# Patient Record
Sex: MALE | Race: BLACK OR AFRICAN AMERICAN | NOT HISPANIC OR LATINO | Employment: OTHER | URBAN - NONMETROPOLITAN AREA
[De-identification: names, ages, dates, MRNs, and addresses within clinical notes are randomized per-mention and may not be internally consistent; named-entity substitution may affect disease eponyms.]

---

## 2020-05-19 ENCOUNTER — HISTORICAL (OUTPATIENT)
Dept: ADMINISTRATIVE | Facility: HOSPITAL | Age: 60
End: 2020-05-19

## 2020-06-21 ENCOUNTER — HISTORICAL (OUTPATIENT)
Dept: ADMINISTRATIVE | Facility: HOSPITAL | Age: 60
End: 2020-06-21

## 2020-12-29 ENCOUNTER — HISTORICAL (OUTPATIENT)
Dept: ADMINISTRATIVE | Facility: HOSPITAL | Age: 60
End: 2020-12-29

## 2021-08-26 ENCOUNTER — HISTORICAL (OUTPATIENT)
Dept: ADMINISTRATIVE | Facility: HOSPITAL | Age: 61
End: 2021-08-26

## 2022-11-13 ENCOUNTER — HISTORICAL (OUTPATIENT)
Dept: ADMINISTRATIVE | Facility: HOSPITAL | Age: 62
End: 2022-11-13

## 2022-11-27 ENCOUNTER — HISTORICAL (OUTPATIENT)
Dept: ADMINISTRATIVE | Facility: HOSPITAL | Age: 62
End: 2022-11-27

## 2022-12-11 ENCOUNTER — HISTORICAL (OUTPATIENT)
Dept: ADMINISTRATIVE | Facility: HOSPITAL | Age: 62
End: 2022-12-11

## 2022-12-12 ENCOUNTER — HISTORICAL (OUTPATIENT)
Dept: ADMINISTRATIVE | Facility: HOSPITAL | Age: 62
End: 2022-12-12

## 2023-03-21 ENCOUNTER — HOSPITAL ENCOUNTER (OUTPATIENT)
Facility: HOSPITAL | Age: 63
Discharge: HOME | End: 2023-03-22
Admitting: FAMILY MEDICINE
Payer: MEDICARE

## 2023-03-21 DIAGNOSIS — D64.9 CHRONIC ANEMIA: ICD-10-CM

## 2023-03-21 DIAGNOSIS — D64.9 SYMPTOMATIC ANEMIA: Primary | ICD-10-CM

## 2023-03-21 DIAGNOSIS — N18.4 STAGE 4 CHRONIC KIDNEY DISEASE: ICD-10-CM

## 2023-03-21 LAB
ABO AND RH: NORMAL
ABORH RETYPE: NORMAL
ANTIBODY SCREEN: NORMAL
SPECIMEN OUTDATE: NORMAL

## 2023-03-21 PROCEDURE — C9113 INJ PANTOPRAZOLE SODIUM, VIA: HCPCS

## 2023-03-21 PROCEDURE — G0378 HOSPITAL OBSERVATION PER HR: HCPCS

## 2023-03-21 PROCEDURE — 36430 TRANSFUSION BLD/BLD COMPNT: CPT

## 2023-03-21 PROCEDURE — 86920 COMPATIBILITY TEST SPIN: CPT | Mod: 59 | Performed by: NURSE PRACTITIONER

## 2023-03-21 PROCEDURE — 63600175 PHARM REV CODE 636 W HCPCS

## 2023-03-21 PROCEDURE — 86900 BLOOD TYPING SEROLOGIC ABO: CPT | Performed by: NURSE PRACTITIONER

## 2023-03-21 PROCEDURE — 36415 COLL VENOUS BLD VENIPUNCTURE: CPT | Performed by: NURSE PRACTITIONER

## 2023-03-21 PROCEDURE — P9016 RBC LEUKOCYTES REDUCED: HCPCS | Performed by: NURSE PRACTITIONER

## 2023-03-21 PROCEDURE — 99285 EMERGENCY DEPT VISIT HI MDM: CPT | Mod: 25

## 2023-03-21 PROCEDURE — 96374 THER/PROPH/DIAG INJ IV PUSH: CPT

## 2023-03-21 RX ORDER — ROSUVASTATIN CALCIUM 40 MG/1
40 TABLET, COATED ORAL
COMMUNITY
Start: 2023-03-03 | End: 2023-08-05 | Stop reason: ALTCHOICE

## 2023-03-21 RX ORDER — AMLODIPINE BESYLATE 10 MG/1
10 TABLET ORAL
Status: ON HOLD | COMMUNITY
Start: 2023-01-03 | End: 2024-03-01 | Stop reason: HOSPADM

## 2023-03-21 RX ORDER — POTASSIUM CHLORIDE 750 MG/1
10 TABLET, FILM COATED, EXTENDED RELEASE ORAL
COMMUNITY
Start: 2023-01-03 | End: 2023-08-05 | Stop reason: ALTCHOICE

## 2023-03-21 RX ORDER — PANTOPRAZOLE SODIUM 40 MG/10ML
40 INJECTION, POWDER, LYOPHILIZED, FOR SOLUTION INTRAVENOUS
Status: COMPLETED | OUTPATIENT
Start: 2023-03-21 | End: 2023-03-21

## 2023-03-21 RX ORDER — NITROGLYCERIN 0.4 MG/1
TABLET SUBLINGUAL
COMMUNITY
Start: 2023-03-21

## 2023-03-21 RX ORDER — METOPROLOL SUCCINATE 100 MG/1
100 TABLET, EXTENDED RELEASE ORAL
COMMUNITY
Start: 2023-01-03 | End: 2023-08-05 | Stop reason: ALTCHOICE

## 2023-03-21 RX ORDER — OLMESARTAN MEDOXOMIL AND HYDROCHLOROTHIAZIDE 40/25 40; 25 MG/1; MG/1
TABLET ORAL
COMMUNITY
Start: 2023-03-14 | End: 2023-08-05 | Stop reason: ALTCHOICE

## 2023-03-21 RX ORDER — SILDENAFIL 100 MG/1
100 TABLET, FILM COATED ORAL DAILY PRN
COMMUNITY
End: 2023-08-05 | Stop reason: ALTCHOICE

## 2023-03-21 RX ORDER — GLIMEPIRIDE 4 MG/1
4 TABLET ORAL
COMMUNITY
Start: 2023-01-03 | End: 2023-08-05 | Stop reason: ALTCHOICE

## 2023-03-21 RX ORDER — CLOPIDOGREL BISULFATE 75 MG/1
TABLET ORAL
COMMUNITY
Start: 2023-03-13 | End: 2023-08-05 | Stop reason: ALTCHOICE

## 2023-03-21 RX ORDER — HYDROCODONE BITARTRATE AND ACETAMINOPHEN 500; 5 MG/1; MG/1
TABLET ORAL
Status: DISCONTINUED | OUTPATIENT
Start: 2023-03-21 | End: 2023-03-22 | Stop reason: HOSPADM

## 2023-03-21 RX ORDER — ASPIRIN 81 MG/1
81 TABLET ORAL DAILY
COMMUNITY
End: 2023-11-01

## 2023-03-21 RX ORDER — FUROSEMIDE 20 MG/1
TABLET ORAL
COMMUNITY
Start: 2023-01-03 | End: 2023-08-05 | Stop reason: ALTCHOICE

## 2023-03-21 RX ADMIN — PANTOPRAZOLE SODIUM 40 MG: 40 INJECTION, POWDER, FOR SOLUTION INTRAVENOUS at 06:03

## 2023-03-21 NOTE — H&P
Tele-Nocturnist History and Physical  Telemedicine Service was provided using HIPPA compliant web platform using SOC for audio/visual equipment.   Patient Location: [place]   Provider Location: [place], [place]  Participants on call: bedside RN, patient  Physician on call: Dr. Lynne  Patient aware of remote access and use of Telemedicine and agrees to continue with care.        History & Physical  Department of [facility]      SUBJECTIVE:     CC/Reason for Admission to Hospital:   Chief Complaint   Patient presents with    Abnormal Lab     TO ED WITH C/O ABNORMAL LAB RESULTS. PT WAS SENT BY HIS MD TO RECEIVE BLOOD       History of Present Illness: History obtained from Patient    Pt is a 62 y.o. male with hx of DMII, HTN, PAD sent to ER from outside physician for abnormal labs.  He is scheduled to have a C this Thursday and had routine labs drawn showing anemia.  He has had anemia before and had blood transfusion prior.  He reports having colonoscopy done for workup but does not know results.  He has no symptoms of near syncope, SOB,HODGES or feeling dizzy.  He does report some claudication symptoms with walking.  He has had sents placed in his legs but not sure where.  He is unaware of any chronic kidney issues.  He denies any chest pain currently.  Denies any melena or BRB in stool.      Review of patient's allergies indicates:  No Known Allergies     Past Medical History:   Diagnosis Date    Diabetes mellitus     Heart disease     Hypertension     Peripheral artery disease      History reviewed. No pertinent surgical history.  History reviewed. No pertinent family history.  Social History     Tobacco Use    Smoking status: Former     Types: Cigarettes    Smokeless tobacco: Never   Substance Use Topics    Alcohol use: Not Currently    Drug use: Never         No current facility-administered medications on file prior to encounter.     Current Outpatient Medications on File Prior to Encounter    Medication Sig Dispense Refill    amLODIPine (NORVASC) 10 MG tablet Take 10 mg by mouth.      aspirin (ECOTRIN) 81 MG EC tablet Take 81 mg by mouth once daily.      clopidogreL (PLAVIX) 75 mg tablet       furosemide (LASIX) 20 MG tablet TAKE 2 TABLET BY MOUTH ONCE A DAY AS NEEDED FOR SWELLING.      glimepiride (AMARYL) 4 MG tablet Take 4 mg by mouth.      KLOR-CON 10 10 mEq TbSR Take 10 mEq by mouth.      metoprolol succinate (TOPROL-XL) 100 MG 24 hr tablet Take 100 mg by mouth.      nitroGLYCERIN (NITROSTAT) 0.4 MG SL tablet Place under the tongue.      olmesartan-hydrochlorothiazide (BENICAR HCT) 40-25 mg per tablet       rosuvastatin (CRESTOR) 40 MG Tab Take 40 mg by mouth.      sildenafiL (VIAGRA) 100 MG tablet Take 100 mg by mouth daily as needed for Erectile Dysfunction.         Review of Systems:  Constitutional: No fever, chills, weight loss  ENT: No nasal congestion or sore throat  Respiratory: No cough or shortness of breath  Cardiovascular: No chest pain or palpitations  Gastrointestinal: No nausea or vomiting, no abdominal pain or change in bowel habits  Genitourinary: No hematuria or dysuria, negative for frequency  Musculoskeletal: No arthralgias or myalgias  Neurological: No seizures or tremors, negative for dizziness and headaches  Psychiatric: No inappropriate thought content. No inappropriate behavior.      OBJECTIVE:     Vital Signs (Most Recent):  Temp: 98.5 °F (36.9 °C) (03/21/23 1731)  Pulse: 64 (03/21/23 1731)  Resp: 20 (03/21/23 1731)  BP: (!) 104/32 (03/21/23 1731)  SpO2: 100 % (03/21/23 1731)       Physical Exam:  General - Resting comfortably in bed. No apparent distress.  HEENT - PERRLA. Extraocular movements intact. No scleral icterus.   Neck -  The JVP is not elevated.   CV - Regular rate and rhythm, No Murmur, rubs, or gallops.  Resp - Good inspiratory effort. The lungs are clear to auscultation no audible wheeze  GI - Soft. Non-tender to palpation.   Extrem -  No cyanosis, clubbing,  edema.  Neuro - CN II through XII are grossly intact. Moves all ext well   PSYC - Alert and oriented times four. Normal affect   SKIN - No obvious rash    Data Review:  CBC:   Lab Results   Component Value Date    WBC 6.4 03/21/2023    RBC 2.60 (L) 03/21/2023    HGB 6.6 (LL) 03/21/2023    HCT 21.5 (L) 03/21/2023     03/21/2023     BMP:   Lab Results   Component Value Date     03/21/2023    K 3.8 03/21/2023    CO2 27 03/21/2023    BUN 46.0 (H) 03/21/2023    CREATININE 2.94 (H) 03/21/2023    CALCIUM 8.4 03/21/2023         Imaging Results    None           Acute medical issues and MDM for this admission:       Problem: Acute blood loss anemia unknown source  Differential Dx: GI bleed vs CKD  Chronic issues affecting care: PAD, CKD  Radiology reports reviewed and/or personal interpretation: NA  Tests considered or ordered: GI scope, additional labs  Plan of care: Admit to OBS for blood transfusion, monitor labs and H/H in am.  May or may not need additional units.  Plan for LHC on Thursday.     Problem: CKD  Differential DX: acute vs chronic kidney disease  Chronic issues affecting care: DM, HTN  Radiology reports reviewed and/or personal interpretation: NA  Tests considered or ordered: Renal US  Plan of care: Monitor labs, do not see a previous lab to compare, likely chronic. Patient unaware of CKD. May need outpatient Nephrology referral    Problem: DMII  Plan of care: SSI and monitor glucose    Problem: HTN  Plan of care: monitor BP, resume and adjust home meds as needed          Chronic medical issues addressed during this admission and plan of care:  PAD - has some claudication symptoms with exertion       Further evaluation and treatment will be contingent on the response to the above therapy as well as the input from the consultants.  Findings for this admission discussed with patient/patient's family/ER physician.  Total time spent with this admission was 34 mins including discussion with ER  physician, Chart review of previous labs and imaging if available, discussion with patient or patient's family the plan of care and possible outcomes.      Anticipate that the patient will require less than 2 midnights for this hospital stay and patient will be admitted to Observation status.      Code Status: Full

## 2023-03-21 NOTE — ED PROVIDER NOTES
Encounter Date: 3/21/2023       History     Chief Complaint   Patient presents with    Abnormal Lab     TO ED WITH C/O ABNORMAL LAB RESULTS. PT WAS SENT BY HIS MD TO RECEIVE BLOOD     62-year-old male with chronic anemia, found to have a hemoglobin of 6.6 on pre coronary angiogram labs.  He was found to have significant anemia last year.  His doctor thinks it is due to poor kidney function.  He has not lost any blood.  He has not had melena.  He had a transfusion in December of 2022 in [place].  His only complaint is lack of energy, and some claudication when walking.    The history is provided by the patient.   Review of patient's allergies indicates:  No Known Allergies  Past Medical History:   Diagnosis Date    Diabetes mellitus     Heart disease     Hypertension     Peripheral artery disease      History reviewed. No pertinent surgical history.  History reviewed. No pertinent family history.  Social History     Tobacco Use    Smoking status: Former     Types: Cigarettes    Smokeless tobacco: Never   Substance Use Topics    Alcohol use: Not Currently    Drug use: Never     Review of Systems   Constitutional:  Positive for fatigue.   Musculoskeletal:         Lower leg pain bilaterally when walking   All other systems reviewed and are negative.    Physical Exam     Initial Vitals [03/21/23 1731]   BP Pulse Resp Temp SpO2   (!) 104/32 64 20 98.5 °F (36.9 °C) 100 %      MAP       --         Physical Exam    Nursing note and vitals reviewed.  Constitutional: Vital signs are normal. He appears well-developed and well-nourished. He is cooperative.   Patient is pleasant and cooperative, and freely conversant.   HENT:   Head: Normocephalic and atraumatic.   Eyes: EOM and lids are normal. Pupils are equal, round, and reactive to light.   Pale conjunctiva   Neck: Trachea normal. Neck supple.   Cardiovascular:  Normal rate, regular rhythm, normal heart sounds and intact distal pulses.           Pulmonary/Chest: Breath  sounds normal.   Abdominal: Abdomen is soft. Bowel sounds are normal.   Musculoskeletal:         General: Normal range of motion.      Cervical back: Normal and neck supple.      Lumbar back: Normal.     Neurological: He is alert and oriented to person, place, and time. He has normal strength. Coordination normal.   Skin: Skin is warm, dry and intact. Capillary refill takes less than 2 seconds.   Psychiatric: He has a normal mood and affect. His speech is normal and behavior is normal. Judgment and thought content normal. Cognition and memory are normal.       ED Course   Procedures  Labs Reviewed   TYPE & SCREEN   ABORH RETYPE   PREPARE RBC SOFT          Imaging Results    None          Medications   0.9%  NaCl infusion (for blood administration) (has no administration in time range)   pantoprazole injection 40 mg (40 mg Intravenous Given 3/21/23 5902)     Medical Decision Making:   Initial Assessment:   Chronic anemia, symptomatic  ED Management:  Type and cross for 2 units  Admit for transfusion                        Clinical Impression:   Final diagnoses:  [D64.9] Symptomatic anemia (Primary)  [D64.9] Chronic anemia  [N18.4] Stage 4 chronic kidney disease        ED Disposition Condition    Observation Stable                Kenny Blair MD  03/21/23 1823       Kenny Blair MD  03/21/23 2008

## 2023-03-22 VITALS
OXYGEN SATURATION: 99 % | TEMPERATURE: 98 F | HEIGHT: 75 IN | DIASTOLIC BLOOD PRESSURE: 66 MMHG | HEART RATE: 60 BPM | SYSTOLIC BLOOD PRESSURE: 134 MMHG | RESPIRATION RATE: 16 BRPM | BODY MASS INDEX: 37.01 KG/M2 | WEIGHT: 297.69 LBS

## 2023-03-22 LAB
ABO + RH BLD: NORMAL
ABO + RH BLD: NORMAL
BLD PROD TYP BPU: NORMAL
BLD PROD TYP BPU: NORMAL
BLOOD UNIT EXPIRATION DATE: NORMAL
BLOOD UNIT EXPIRATION DATE: NORMAL
BLOOD UNIT TYPE CODE: 5100
BLOOD UNIT TYPE CODE: 5100
CROSSMATCH INTERPRETATION: NORMAL
CROSSMATCH INTERPRETATION: NORMAL
DISPENSE STATUS: NORMAL
DISPENSE STATUS: NORMAL
HCT VFR BLD AUTO: 25.5 % (ref 36–52)
HGB BLD-MCNC: 8 G/DL (ref 13–18)
POCT GLUCOSE: 125 MG/DL (ref 70–110)
UNIT NUMBER: NORMAL
UNIT NUMBER: NORMAL

## 2023-03-22 PROCEDURE — 36430 TRANSFUSION BLD/BLD COMPNT: CPT

## 2023-03-22 PROCEDURE — 36415 COLL VENOUS BLD VENIPUNCTURE: CPT | Performed by: FAMILY MEDICINE

## 2023-03-22 PROCEDURE — 25000003 PHARM REV CODE 250

## 2023-03-22 PROCEDURE — G0378 HOSPITAL OBSERVATION PER HR: HCPCS

## 2023-03-22 PROCEDURE — 94761 N-INVAS EAR/PLS OXIMETRY MLT: CPT

## 2023-03-22 PROCEDURE — 85014 HEMATOCRIT: CPT | Performed by: FAMILY MEDICINE

## 2023-03-22 PROCEDURE — P9016 RBC LEUKOCYTES REDUCED: HCPCS | Performed by: NURSE PRACTITIONER

## 2023-03-22 PROCEDURE — 25000003 PHARM REV CODE 250: Performed by: FAMILY MEDICINE

## 2023-03-22 RX ORDER — SODIUM CHLORIDE 0.9 % (FLUSH) 0.9 %
10 SYRINGE (ML) INJECTION
Status: DISCONTINUED | OUTPATIENT
Start: 2023-03-22 | End: 2023-03-22 | Stop reason: HOSPADM

## 2023-03-22 RX ORDER — AMLODIPINE BESYLATE 5 MG/1
10 TABLET ORAL DAILY
Status: DISCONTINUED | OUTPATIENT
Start: 2023-03-22 | End: 2023-03-22 | Stop reason: HOSPADM

## 2023-03-22 RX ORDER — GLUCAGON 1 MG
1 KIT INJECTION
Status: DISCONTINUED | OUTPATIENT
Start: 2023-03-22 | End: 2023-03-22 | Stop reason: HOSPADM

## 2023-03-22 RX ORDER — FUROSEMIDE 20 MG/1
20 TABLET ORAL DAILY
Status: DISCONTINUED | OUTPATIENT
Start: 2023-03-22 | End: 2023-03-22 | Stop reason: HOSPADM

## 2023-03-22 RX ORDER — GLIMEPIRIDE 2 MG/1
4 TABLET ORAL
Status: DISCONTINUED | OUTPATIENT
Start: 2023-03-22 | End: 2023-03-22 | Stop reason: HOSPADM

## 2023-03-22 RX ORDER — TALC
6 POWDER (GRAM) TOPICAL NIGHTLY PRN
Status: DISCONTINUED | OUTPATIENT
Start: 2023-03-22 | End: 2023-03-22 | Stop reason: HOSPADM

## 2023-03-22 RX ORDER — METOPROLOL SUCCINATE 50 MG/1
100 TABLET, EXTENDED RELEASE ORAL DAILY
Status: DISCONTINUED | OUTPATIENT
Start: 2023-03-22 | End: 2023-03-22 | Stop reason: HOSPADM

## 2023-03-22 RX ORDER — CLOPIDOGREL BISULFATE 75 MG/1
75 TABLET ORAL DAILY
Status: DISCONTINUED | OUTPATIENT
Start: 2023-03-22 | End: 2023-03-22 | Stop reason: HOSPADM

## 2023-03-22 RX ORDER — INSULIN ASPART 100 [IU]/ML
0-5 INJECTION, SOLUTION INTRAVENOUS; SUBCUTANEOUS
Status: DISCONTINUED | OUTPATIENT
Start: 2023-03-22 | End: 2023-03-22 | Stop reason: HOSPADM

## 2023-03-22 RX ORDER — NITROGLYCERIN 0.4 MG/1
0.4 TABLET SUBLINGUAL EVERY 5 MIN PRN
Status: DISCONTINUED | OUTPATIENT
Start: 2023-03-22 | End: 2023-03-22 | Stop reason: HOSPADM

## 2023-03-22 RX ORDER — POTASSIUM CHLORIDE 750 MG/1
10 TABLET, EXTENDED RELEASE ORAL 2 TIMES DAILY
Status: DISCONTINUED | OUTPATIENT
Start: 2023-03-22 | End: 2023-03-22 | Stop reason: HOSPADM

## 2023-03-22 RX ORDER — ASPIRIN 81 MG/1
81 TABLET ORAL DAILY
Status: DISCONTINUED | OUTPATIENT
Start: 2023-03-22 | End: 2023-03-22 | Stop reason: HOSPADM

## 2023-03-22 RX ORDER — LOSARTAN POTASSIUM 50 MG/1
50 TABLET ORAL DAILY
Status: DISCONTINUED | OUTPATIENT
Start: 2023-03-22 | End: 2023-03-22 | Stop reason: HOSPADM

## 2023-03-22 RX ORDER — ENOXAPARIN SODIUM 100 MG/ML
40 INJECTION SUBCUTANEOUS EVERY 24 HOURS
Status: DISCONTINUED | OUTPATIENT
Start: 2023-03-22 | End: 2023-03-22 | Stop reason: HOSPADM

## 2023-03-22 RX ORDER — DEXTROSE 40 %
15 GEL (GRAM) ORAL
Status: DISCONTINUED | OUTPATIENT
Start: 2023-03-22 | End: 2023-03-22 | Stop reason: HOSPADM

## 2023-03-22 RX ORDER — HYDROCHLOROTHIAZIDE 25 MG/1
25 TABLET ORAL DAILY
Status: DISCONTINUED | OUTPATIENT
Start: 2023-03-22 | End: 2023-03-22 | Stop reason: HOSPADM

## 2023-03-22 RX ORDER — ATORVASTATIN CALCIUM 40 MG/1
80 TABLET, FILM COATED ORAL DAILY
Status: DISCONTINUED | OUTPATIENT
Start: 2023-03-22 | End: 2023-03-22 | Stop reason: HOSPADM

## 2023-03-22 RX ORDER — POTASSIUM CHLORIDE 750 MG/1
10 TABLET, EXTENDED RELEASE ORAL 2 TIMES DAILY
Status: DISCONTINUED | OUTPATIENT
Start: 2023-03-22 | End: 2023-03-22

## 2023-03-22 RX ORDER — ACETAMINOPHEN 325 MG/1
650 TABLET ORAL EVERY 8 HOURS PRN
Status: DISCONTINUED | OUTPATIENT
Start: 2023-03-22 | End: 2023-03-22 | Stop reason: HOSPADM

## 2023-03-22 RX ORDER — FAMOTIDINE 20 MG/1
20 TABLET, FILM COATED ORAL DAILY
Status: DISCONTINUED | OUTPATIENT
Start: 2023-03-22 | End: 2023-03-22 | Stop reason: HOSPADM

## 2023-03-22 RX ORDER — DEXTROSE 40 %
30 GEL (GRAM) ORAL
Status: DISCONTINUED | OUTPATIENT
Start: 2023-03-22 | End: 2023-03-22 | Stop reason: HOSPADM

## 2023-03-22 RX ADMIN — POTASSIUM CHLORIDE 10 MEQ: 750 TABLET, EXTENDED RELEASE ORAL at 09:03

## 2023-03-22 RX ADMIN — AMLODIPINE BESYLATE 10 MG: 5 TABLET ORAL at 09:03

## 2023-03-22 RX ADMIN — CLOPIDOGREL BISULFATE 75 MG: 75 TABLET ORAL at 09:03

## 2023-03-22 RX ADMIN — GLIMEPIRIDE 4 MG: 2 TABLET ORAL at 08:03

## 2023-03-22 RX ADMIN — FUROSEMIDE 20 MG: 20 TABLET ORAL at 09:03

## 2023-03-22 RX ADMIN — METOPROLOL SUCCINATE 100 MG: 50 TABLET, FILM COATED, EXTENDED RELEASE ORAL at 09:03

## 2023-03-22 NOTE — DISCHARGE SUMMARY
[facility]/Surg  Hospital Medicine  Discharge Summary      Patient Name: Rustam Matthews  MRN: [id number]  DENIZ: [id number]  Patient Class: OP- Observation  Admission Date: 3/21/2023  Hospital Length of Stay: 0 days  Discharge Date and Time:  03/22/2023 12:16 PM  Attending Physician: Jim Odonnell MD   Discharging Provider: Brett Conner MD  Primary Care Provider: Vasquez Rodriguez MD    Primary Care Team: Networked reference to record PCT     HPI:   No notes on file    * No surgery found *      Hospital Course:   03/22/2023 brief discharge summary 62-year-old male admitted for chronic anemia with hemoglobin of 6.6.  He is scheduled for left heart catheterization next week.  It was felt that he needed blood.  There was no evidence of active bleeding.  Hospital course: Patient was admitted given 2 units of packed red blood cells he tolerated that fine.  Hemoglobin is up to 8.  Feel he is stable ready for discharge home.  We will follow up with Rosa Rodriguez in 1 week.       Goals of Care Treatment Preferences:  Code Status: Full Code      Consults:   Consults (From admission, onward)        Status Ordering Provider     Inpatient virtual consult to Hospital Medicine  Once        Provider:  (Not yet assigned)    Acknowledged BECCA ROBLES          No new Assessment & Plan notes have been filed under this hospital service since the last note was generated.  Service: Hospital Medicine    Final Active Diagnoses:    Diagnosis Date Noted POA    PRINCIPAL PROBLEM:  Chronic anemia [D64.9] 03/21/2023 Unknown    Stage 4 chronic kidney disease [N18.4] 03/21/2023 Unknown      Problems Resolved During this Admission:       Discharged Condition: good    Disposition: Home or Self Care    Follow Up:   Follow-up Information     Vasquez Rodriguez MD Follow up in 1 week(s).    Specialty: Family Medicine  Contact information:  [place] [address]  [phone]                       Patient  Instructions:   No discharge procedures on file.    Significant Diagnostic Studies:     Pending Diagnostic Studies:     None         Medications:  Reconciled Home Medications:      Medication List      CONTINUE taking these medications    amLODIPine 10 MG tablet  Commonly known as: NORVASC  Take 10 mg by mouth.     aspirin 81 MG EC tablet  Commonly known as: ECOTRIN  Take 81 mg by mouth once daily.     clopidogreL 75 mg tablet  Commonly known as: PLAVIX     furosemide 20 MG tablet  Commonly known as: LASIX  TAKE 2 TABLET BY MOUTH ONCE A DAY AS NEEDED FOR SWELLING.     glimepiride 4 MG tablet  Commonly known as: AMARYL  Take 4 mg by mouth.     KLOR-CON 10 10 MEQ Tbsr  Generic drug: potassium chloride  Take 10 mEq by mouth.     metoprolol succinate 100 MG 24 hr tablet  Commonly known as: TOPROL-XL  Take 100 mg by mouth.     nitroGLYCERIN 0.4 MG SL tablet  Commonly known as: NITROSTAT  Place under the tongue.     olmesartan-hydrochlorothiazide 40-25 mg per tablet  Commonly known as: BENICAR HCT     rosuvastatin 40 MG Tab  Commonly known as: CRESTOR  Take 40 mg by mouth.     sildenafiL 100 MG tablet  Commonly known as: VIAGRA  Take 100 mg by mouth daily as needed for Erectile Dysfunction.            Indwelling Lines/Drains at time of discharge:   Lines/Drains/Airways     None                 Time spent on the discharge of patient: 35 minutes  Physical Exam:  General - Resting comfortably in bed. No apparent distress.  HEENT - PERRLA. Extraocular movements intact. No scleral icterus.   Neck -  The JVP is not elevated.   CV - Regular rate and rhythm, No Murmur, rubs, or gallops.  Resp - Good inspiratory effort. The lungs are clear to auscultation no audible wheeze  GI - Soft. Non-tender to palpation.   Extrem -  No cyanosis, clubbing, edema.  Neuro - CN II through XII are grossly intact. Moves all ext well   PSYC - Alert and oriented times four. Normal affect   SKIN - No obvious rash       Brett Conner MD  Department  of Hospital Medicine  [facility]-Med/Surg

## 2023-03-22 NOTE — HOSPITAL COURSE
03/22/2023 brief discharge summary 62-year-old male admitted for chronic anemia with hemoglobin of 6.6.  He is scheduled for left heart catheterization next week.  It was felt that he needed blood.  There was no evidence of active bleeding.  Hospital course: Patient was admitted given 2 units of packed red blood cells he tolerated that fine.  Hemoglobin is up to 8.  Feel he is stable ready for discharge home.  We will follow up with Rosa Rodriguez in 1 week.

## 2023-03-22 NOTE — PLAN OF CARE
03/22/23 1200   Final Note   Assessment Type Final Discharge Note   Anticipated Discharge Disposition Home   What phone number can be called within the next 1-3 days to see how you are doing after discharge? [phone]   Post-Acute Status   Discharge Delays None known at this time

## 2023-03-22 NOTE — PLAN OF CARE
03/22/23 1022   Discharge Assessment   Assessment Type Discharge Planning Assessment   Confirmed/corrected address, phone number and insurance Yes   Confirmed Demographics Contacted registration to update   Source of Information patient   When was your last doctors appointment? 03/15/23   Reason For Admission Anemia   People in Home alone   Do you expect to return to your current living situation? Yes   Prior to hospitilization cognitive status: Alert/Oriented   Current cognitive status: Alert/Oriented   Equipment Currently Used at Home none   Readmission within 30 days? No   Patient currently being followed by outpatient case management? No   Do you currently have service(s) that help you manage your care at home? No   Do you take prescription medications? Yes   Do you have prescription coverage? Yes   Coverage [facility] Managed MCR   Do you have any problems affording any of your prescribed medications? No   Is the patient taking medications as prescribed? yes   Who is going to help you get home at discharge? Drive self   How do you get to doctors appointments? car, drives self   Are you on dialysis? No   Do you take coumadin? No   Discharge Plan A Home   DME Needed Upon Discharge  none   Discharge Plan discussed with: Patient   Discharge Barriers Identified None   Physical Activity   On average, how many days per week do you engage in moderate to strenuous exercise (like a brisk walk)? 0 days   On average, how many minutes do you engage in exercise at this level? 0 min   Financial Resource Strain   How hard is it for you to pay for the very basics like food, housing, medical care, and heating? Not hard   Housing Stability   In the last 12 months, was there a time when you were not able to pay the mortgage or rent on time? N   In the last 12 months, how many places have you lived? 1   In the last 12 months, was there a time when you did not have a steady place to sleep or slept in a shelter (including now)? N    Transportation Needs   In the past 12 months, has lack of transportation kept you from medical appointments or from getting medications? no   In the past 12 months, has lack of transportation kept you from meetings, work, or from getting things needed for daily living? No   Food Insecurity   Within the past 12 months, you worried that your food would run out before you got the money to buy more. Never true   Within the past 12 months, the food you bought just didn't last and you didn't have money to get more. Never true   Stress   Do you feel stress - tense, restless, nervous, or anxious, or unable to sleep at night because your mind is troubled all the time - these days? Not at all   Social Connections   How often do you get together with friends or relatives? Never   How often do you attend Sikhism or Taoist services? 1 to 4   Do you belong to any clubs or organizations such as Sikhism groups, unions, fraternal or athletic groups, or school groups? No   How often do you attend meetings of the clubs or organizations you belong to? Never   Are you , , , , never , or living with a partner?    Alcohol Use   Q1: How often do you have a drink containing alcohol? Never   Q2: How many drinks containing alcohol do you have on a typical day when you are drinking? None   Q3: How often do you have six or more drinks on one occasion? Never

## 2023-04-12 DIAGNOSIS — D52.0 DIETARY FOLATE DEFICIENCY ANEMIA: ICD-10-CM

## 2023-04-12 DIAGNOSIS — D64.9 CHRONIC ANEMIA: Primary | ICD-10-CM

## 2023-04-13 ENCOUNTER — OFFICE VISIT (OUTPATIENT)
Dept: HEMATOLOGY/ONCOLOGY | Facility: CLINIC | Age: 63
End: 2023-04-13
Payer: MEDICARE

## 2023-04-13 VITALS
WEIGHT: 296.81 LBS | RESPIRATION RATE: 18 BRPM | HEART RATE: 66 BPM | SYSTOLIC BLOOD PRESSURE: 104 MMHG | HEIGHT: 75 IN | OXYGEN SATURATION: 100 % | BODY MASS INDEX: 36.9 KG/M2 | DIASTOLIC BLOOD PRESSURE: 61 MMHG | TEMPERATURE: 98 F

## 2023-04-13 DIAGNOSIS — D50.0 IRON DEFICIENCY ANEMIA DUE TO CHRONIC BLOOD LOSS: ICD-10-CM

## 2023-04-13 DIAGNOSIS — D64.9 CHRONIC ANEMIA: Primary | ICD-10-CM

## 2023-04-13 PROCEDURE — 3074F PR MOST RECENT SYSTOLIC BLOOD PRESSURE < 130 MM HG: ICD-10-PCS | Mod: CPTII,S$GLB,, | Performed by: STUDENT IN AN ORGANIZED HEALTH CARE EDUCATION/TRAINING PROGRAM

## 2023-04-13 PROCEDURE — 1160F RVW MEDS BY RX/DR IN RCRD: CPT | Mod: CPTII,S$GLB,, | Performed by: STUDENT IN AN ORGANIZED HEALTH CARE EDUCATION/TRAINING PROGRAM

## 2023-04-13 PROCEDURE — 3078F DIAST BP <80 MM HG: CPT | Mod: CPTII,S$GLB,, | Performed by: STUDENT IN AN ORGANIZED HEALTH CARE EDUCATION/TRAINING PROGRAM

## 2023-04-13 PROCEDURE — 3078F PR MOST RECENT DIASTOLIC BLOOD PRESSURE < 80 MM HG: ICD-10-PCS | Mod: CPTII,S$GLB,, | Performed by: STUDENT IN AN ORGANIZED HEALTH CARE EDUCATION/TRAINING PROGRAM

## 2023-04-13 PROCEDURE — 99205 PR OFFICE/OUTPT VISIT, NEW, LEVL V, 60-74 MIN: ICD-10-PCS | Mod: S$GLB,,, | Performed by: STUDENT IN AN ORGANIZED HEALTH CARE EDUCATION/TRAINING PROGRAM

## 2023-04-13 PROCEDURE — 3008F BODY MASS INDEX DOCD: CPT | Mod: CPTII,S$GLB,, | Performed by: STUDENT IN AN ORGANIZED HEALTH CARE EDUCATION/TRAINING PROGRAM

## 2023-04-13 PROCEDURE — 3008F PR BODY MASS INDEX (BMI) DOCUMENTED: ICD-10-PCS | Mod: CPTII,S$GLB,, | Performed by: STUDENT IN AN ORGANIZED HEALTH CARE EDUCATION/TRAINING PROGRAM

## 2023-04-13 PROCEDURE — 3074F SYST BP LT 130 MM HG: CPT | Mod: CPTII,S$GLB,, | Performed by: STUDENT IN AN ORGANIZED HEALTH CARE EDUCATION/TRAINING PROGRAM

## 2023-04-13 PROCEDURE — 1159F MED LIST DOCD IN RCRD: CPT | Mod: CPTII,S$GLB,, | Performed by: STUDENT IN AN ORGANIZED HEALTH CARE EDUCATION/TRAINING PROGRAM

## 2023-04-13 PROCEDURE — 99999 PR PBB SHADOW E&M-EST. PATIENT-LVL V: ICD-10-PCS | Mod: PBBFAC,,, | Performed by: STUDENT IN AN ORGANIZED HEALTH CARE EDUCATION/TRAINING PROGRAM

## 2023-04-13 PROCEDURE — 99205 OFFICE O/P NEW HI 60 MIN: CPT | Mod: S$GLB,,, | Performed by: STUDENT IN AN ORGANIZED HEALTH CARE EDUCATION/TRAINING PROGRAM

## 2023-04-13 PROCEDURE — 99999 PR PBB SHADOW E&M-EST. PATIENT-LVL V: CPT | Mod: PBBFAC,,, | Performed by: STUDENT IN AN ORGANIZED HEALTH CARE EDUCATION/TRAINING PROGRAM

## 2023-04-13 PROCEDURE — 1159F PR MEDICATION LIST DOCUMENTED IN MEDICAL RECORD: ICD-10-PCS | Mod: CPTII,S$GLB,, | Performed by: STUDENT IN AN ORGANIZED HEALTH CARE EDUCATION/TRAINING PROGRAM

## 2023-04-13 PROCEDURE — 1160F PR REVIEW ALL MEDS BY PRESCRIBER/CLIN PHARMACIST DOCUMENTED: ICD-10-PCS | Mod: CPTII,S$GLB,, | Performed by: STUDENT IN AN ORGANIZED HEALTH CARE EDUCATION/TRAINING PROGRAM

## 2023-04-13 RX ORDER — SODIUM CHLORIDE 9 MG/ML
INJECTION, SOLUTION INTRAVENOUS CONTINUOUS
Status: CANCELLED | OUTPATIENT
Start: 2023-04-24

## 2023-04-13 RX ORDER — SODIUM CHLORIDE 0.9 % (FLUSH) 0.9 %
10 SYRINGE (ML) INJECTION
Status: CANCELLED | OUTPATIENT
Start: 2023-04-24

## 2023-04-13 RX ORDER — HEPARIN 100 UNIT/ML
5 SYRINGE INTRAVENOUS
Status: CANCELLED | OUTPATIENT
Start: 2023-04-24

## 2023-04-13 NOTE — PROGRESS NOTES
Subjective:       Patient ID: Rustam Matthews is a 62 y.o. male.    Chief Complaint: New Patient    Diagnosis: Iron Deficiency Anemia    Current Treatment: None    Treatment History: N/A    HPI  63yo M who presents in April 2023 for evaluation of anemia. He has required at least 2 blood transfusions in December'22 and March '23 for his anemia with hgb commonly <7. He has had a colonoscopy and EGD within the last 3-4 months prior to presentation that was reportedly normal although I do not have this report. He is on plavix and aspirin as dictated by his cardiologist for vascular stents. He has never required blood transfusions prior to this. He notes occasional dark stools. Denies any abdominal pain, weight loss, diet changes.   He denies being symptomatic at the time of his blood transfusions. Notices his leg pain is improved after blood transfusions. He has tried oral iron pills previously and has been unable to tolerate due to GI side effects    Past Medical History:   Diagnosis Date    Diabetes mellitus     Heart disease     Hypertension     Peripheral artery disease       History reviewed. No pertinent surgical history.  Social History     Socioeconomic History    Marital status: Unknown   Tobacco Use    Smoking status: Former     Types: Cigarettes    Smokeless tobacco: Never   Substance and Sexual Activity    Alcohol use: Not Currently    Drug use: Never     Social Determinants of Health     Financial Resource Strain: Low Risk     Difficulty of Paying Living Expenses: Not hard at all   Food Insecurity: No Food Insecurity    Worried About Running Out of Food in the Last Year: Never true    Ran Out of Food in the Last Year: Never true   Transportation Needs: No Transportation Needs    Lack of Transportation (Medical): No    Lack of Transportation (Non-Medical): No   Physical Activity: Inactive    Days of Exercise per Week: 0 days    Minutes of Exercise per Session: 0 min   Stress: No Stress Concern Present     Feeling of Stress : Not at all   Social Connections: Unknown    Frequency of Social Gatherings with Friends and Family: Never    Attends Rastafarian Services: 1 to 4 times per year    Active Member of Clubs or Organizations: No    Attends Club or Organization Meetings: Never    Marital Status:    Housing Stability: Low Risk     Unable to Pay for Housing in the Last Year: No    Number of Places Lived in the Last Year: 1    Unstable Housing in the Last Year: No      History reviewed. No pertinent family history.   Review of patient's allergies indicates:   Allergen Reactions    Allergenic extract-food-shrimp Rash     All seafood      Lactose Rash    Mayonnaise Rash      Review of Systems   Constitutional:  Negative for appetite change and unexpected weight change.   HENT:  Negative for mouth sores.    Eyes:  Negative for visual disturbance.   Respiratory:  Negative for cough and shortness of breath.    Cardiovascular:  Negative for chest pain.   Gastrointestinal:  Negative for abdominal pain and diarrhea.   Genitourinary:  Negative for frequency.   Musculoskeletal:  Negative for back pain.   Integumentary:  Negative for rash.   Neurological:  Negative for headaches.   Hematological:  Negative for adenopathy.   Psychiatric/Behavioral:  The patient is not nervous/anxious.        Objective:      Vitals:    04/13/23 1500   BP: 104/61   Pulse: 66   Resp: 18   Temp: 98 °F (36.7 °C)       Physical Exam  Constitutional:       General: He is not in acute distress.     Appearance: Normal appearance. He is not ill-appearing.   HENT:      Head: Normocephalic and atraumatic.      Nose: Nose normal.      Mouth/Throat:      Mouth: Mucous membranes are moist.      Pharynx: Oropharynx is clear.   Eyes:      Extraocular Movements: Extraocular movements intact.      Conjunctiva/sclera: Conjunctivae normal.      Pupils: Pupils are equal, round, and reactive to light.   Cardiovascular:      Rate and Rhythm: Normal rate and regular  rhythm.      Pulses: Normal pulses.      Heart sounds: Normal heart sounds. No murmur heard.  Pulmonary:      Effort: Pulmonary effort is normal. No respiratory distress.      Breath sounds: Normal breath sounds.   Abdominal:      General: There is no distension.      Palpations: Abdomen is soft.      Tenderness: There is no abdominal tenderness.   Musculoskeletal:         General: Normal range of motion.      Cervical back: Normal range of motion and neck supple.      Right lower leg: No edema.      Left lower leg: No edema.   Lymphadenopathy:      Cervical: No cervical adenopathy.   Skin:     General: Skin is warm and dry.   Neurological:      General: No focal deficit present.      Mental Status: He is alert and oriented to person, place, and time.       LABS AND IMAGING REVIEWED IN EPIC    Component      Latest Ref Rng & Units 4/13/2023   WBC      4.5 - 11.5 x10(3)/mcL 8.8   RBC      4.70 - 6.10 x10(6)/mcL 2.75 (L)   Hemoglobin      14.0 - 18.0 g/dL 7.3 (L)   Hematocrit      42.0 - 52.0 % 25.0 (L)   MCV      80.0 - 94.0 fL 90.9   MCH      27.0 - 31.0 pg 26.5 (L)   MCHC      33.0 - 36.0 g/dL 29.2 (L)   RDW      11.5 - 17.0 % 17.5 (H)   Platelets      130 - 400 x10(3)/mcL 236   MPV      7.4 - 10.4 fL 9.7     Component      Latest Ref Rng & Units 4/13/2023   Iron Binding Capacity Unsaturated      69 - 240 ug/dL 276 (H)   Iron      65 - 175 ug/dL 48 (L)   TIBC      250 - 450 ug/dL 324   Iron Saturation      20 - 50 % 15 (L)     Component      Latest Ref Rng & Units 4/13/2023   Ferritin      21.81 - 274.66 ng/mL 12.92 (L)       Assessment:   Iron Deficiency Anemia - requiring hospitalizations/numerous blood transfusions.         Plan:       - Patient needs to return back to GI for evaluation of small bowel pill cam  - Plan for injectafer qw x 2 dose (mondays, early mornings)  - RTC in 8 weeks with MD and labs  - cbc, cmp, iron panel, ferritin- 1 hr prior @ [facility]      I spent a total of 60 minutes on the day of the  visit.This includes face to face time and non-face to face time preparing to see the patient (eg, review of tests), obtaining and/or reviewing separately obtained history, documenting clinical information in the electronic or other health record, independently interpreting results and communicating results to the patient/family/caregiver, or care coordinator.      Elizabeth Kennedy LeJeune, MD  Hematology/Oncology   [facility]        Professional Services   I, Indu Dumont LPN, acted solely as a scribe for and in the presence of Dr. Elizabeth Lejeune, who performed these services.

## 2023-04-17 RX ORDER — HEPARIN 100 UNIT/ML
500 SYRINGE INTRAVENOUS
Status: CANCELLED | OUTPATIENT
Start: 2023-04-20

## 2023-04-17 RX ORDER — SODIUM CHLORIDE 0.9 % (FLUSH) 0.9 %
10 SYRINGE (ML) INJECTION
Status: CANCELLED | OUTPATIENT
Start: 2023-04-20

## 2023-04-20 ENCOUNTER — INFUSION (OUTPATIENT)
Dept: INFUSION THERAPY | Facility: HOSPITAL | Age: 63
End: 2023-04-20
Attending: FAMILY MEDICINE
Payer: MEDICARE

## 2023-04-20 VITALS
HEIGHT: 75 IN | SYSTOLIC BLOOD PRESSURE: 134 MMHG | BODY MASS INDEX: 36.51 KG/M2 | OXYGEN SATURATION: 99 % | HEART RATE: 64 BPM | WEIGHT: 293.63 LBS | DIASTOLIC BLOOD PRESSURE: 74 MMHG | TEMPERATURE: 98 F

## 2023-04-20 DIAGNOSIS — D50.0 IRON DEFICIENCY ANEMIA DUE TO CHRONIC BLOOD LOSS: Primary | ICD-10-CM

## 2023-04-20 PROCEDURE — 96366 THER/PROPH/DIAG IV INF ADDON: CPT

## 2023-04-20 PROCEDURE — 25000003 PHARM REV CODE 250: Performed by: STUDENT IN AN ORGANIZED HEALTH CARE EDUCATION/TRAINING PROGRAM

## 2023-04-20 PROCEDURE — 63600175 PHARM REV CODE 636 W HCPCS: Performed by: STUDENT IN AN ORGANIZED HEALTH CARE EDUCATION/TRAINING PROGRAM

## 2023-04-20 PROCEDURE — 96365 THER/PROPH/DIAG IV INF INIT: CPT

## 2023-04-20 RX ORDER — SODIUM CHLORIDE 0.9 % (FLUSH) 0.9 %
10 SYRINGE (ML) INJECTION
Status: CANCELLED | OUTPATIENT
Start: 2023-04-27

## 2023-04-20 RX ORDER — HEPARIN 100 UNIT/ML
500 SYRINGE INTRAVENOUS
Status: CANCELLED | OUTPATIENT
Start: 2023-04-27

## 2023-04-20 RX ADMIN — IRON SUCROSE 250 MG: 20 INJECTION, SOLUTION INTRAVENOUS at 02:04

## 2023-04-20 RX ADMIN — SODIUM CHLORIDE: 9 INJECTION, SOLUTION INTRAVENOUS at 03:04

## 2023-04-27 ENCOUNTER — INFUSION (OUTPATIENT)
Dept: INFUSION THERAPY | Facility: HOSPITAL | Age: 63
End: 2023-04-27
Attending: FAMILY MEDICINE
Payer: MEDICARE

## 2023-04-27 VITALS
WEIGHT: 297 LBS | HEIGHT: 75 IN | TEMPERATURE: 98 F | HEART RATE: 65 BPM | OXYGEN SATURATION: 100 % | DIASTOLIC BLOOD PRESSURE: 49 MMHG | SYSTOLIC BLOOD PRESSURE: 112 MMHG | BODY MASS INDEX: 36.93 KG/M2

## 2023-04-27 DIAGNOSIS — D50.0 IRON DEFICIENCY ANEMIA DUE TO CHRONIC BLOOD LOSS: Primary | ICD-10-CM

## 2023-04-27 PROCEDURE — 96365 THER/PROPH/DIAG IV INF INIT: CPT

## 2023-04-27 PROCEDURE — 25000003 PHARM REV CODE 250: Performed by: STUDENT IN AN ORGANIZED HEALTH CARE EDUCATION/TRAINING PROGRAM

## 2023-04-27 PROCEDURE — 63600175 PHARM REV CODE 636 W HCPCS: Performed by: STUDENT IN AN ORGANIZED HEALTH CARE EDUCATION/TRAINING PROGRAM

## 2023-04-27 RX ORDER — SODIUM CHLORIDE 0.9 % (FLUSH) 0.9 %
10 SYRINGE (ML) INJECTION
Status: CANCELLED | OUTPATIENT
Start: 2023-05-04

## 2023-04-27 RX ORDER — HEPARIN 100 UNIT/ML
500 SYRINGE INTRAVENOUS
Status: CANCELLED | OUTPATIENT
Start: 2023-05-04

## 2023-04-27 RX ADMIN — IRON SUCROSE 250 MG: 20 INJECTION, SOLUTION INTRAVENOUS at 02:04

## 2023-04-28 ENCOUNTER — LAB VISIT (OUTPATIENT)
Dept: LAB | Facility: HOSPITAL | Age: 63
End: 2023-04-28
Attending: STUDENT IN AN ORGANIZED HEALTH CARE EDUCATION/TRAINING PROGRAM
Payer: MEDICARE

## 2023-04-28 ENCOUNTER — HOSPITAL ENCOUNTER (OUTPATIENT)
Facility: HOSPITAL | Age: 63
Discharge: HOME | End: 2023-04-28
Attending: FAMILY MEDICINE | Admitting: FAMILY MEDICINE
Payer: MEDICARE

## 2023-04-28 VITALS
HEART RATE: 68 BPM | BODY MASS INDEX: 37.13 KG/M2 | HEIGHT: 75 IN | WEIGHT: 298.63 LBS | TEMPERATURE: 98 F | OXYGEN SATURATION: 100 % | SYSTOLIC BLOOD PRESSURE: 119 MMHG | RESPIRATION RATE: 18 BRPM | DIASTOLIC BLOOD PRESSURE: 57 MMHG

## 2023-04-28 DIAGNOSIS — D64.9 SYMPTOMATIC ANEMIA: ICD-10-CM

## 2023-04-28 DIAGNOSIS — D50.0 IRON DEFICIENCY ANEMIA DUE TO CHRONIC BLOOD LOSS: ICD-10-CM

## 2023-04-28 DIAGNOSIS — D64.9 ANEMIA: ICD-10-CM

## 2023-04-28 DIAGNOSIS — D50.0 IRON DEFICIENCY ANEMIA DUE TO CHRONIC BLOOD LOSS: Primary | ICD-10-CM

## 2023-04-28 DIAGNOSIS — D64.9 SYMPTOMATIC ANEMIA: Primary | ICD-10-CM

## 2023-04-28 LAB
ABO + RH BLD: NORMAL
ABO + RH BLD: NORMAL
ABO AND RH: NORMAL
ANTIBODY SCREEN: NORMAL
BASOPHILS # BLD AUTO: 0.02 X10(3)/MCL (ref 0–0.2)
BASOPHILS NFR BLD AUTO: 0.3 %
BLD PROD TYP BPU: NORMAL
BLD PROD TYP BPU: NORMAL
BLOOD UNIT EXPIRATION DATE: NORMAL
BLOOD UNIT EXPIRATION DATE: NORMAL
BLOOD UNIT TYPE CODE: 5100
BLOOD UNIT TYPE CODE: 5100
CROSSMATCH INTERPRETATION: NORMAL
CROSSMATCH INTERPRETATION: NORMAL
DISPENSE STATUS: NORMAL
DISPENSE STATUS: NORMAL
EOSINOPHIL # BLD AUTO: 0.17 X10(3)/MCL (ref 0–0.9)
EOSINOPHIL NFR BLD AUTO: 2.2 %
ERYTHROCYTE [DISTWIDTH] IN BLOOD BY AUTOMATED COUNT: 18.9 % (ref 11.5–17)
HCT VFR BLD AUTO: 21.9 % (ref 42–52)
HGB BLD-MCNC: 6.4 G/DL (ref 14–18)
IMM GRANULOCYTES # BLD AUTO: 0.02 X10(3)/MCL (ref 0–0.04)
IMM GRANULOCYTES NFR BLD AUTO: 0.3 %
LYMPHOCYTES # BLD AUTO: 1.65 X10(3)/MCL (ref 0.6–4.6)
LYMPHOCYTES NFR BLD AUTO: 21.2 %
MCH RBC QN AUTO: 28.3 PG (ref 27–31)
MCHC RBC AUTO-ENTMCNC: 29.2 G/DL (ref 33–36)
MCV RBC AUTO: 96.9 FL (ref 80–94)
MONOCYTES # BLD AUTO: 0.55 X10(3)/MCL (ref 0.1–1.3)
MONOCYTES NFR BLD AUTO: 7.1 %
NEUTROPHILS # BLD AUTO: 5.38 X10(3)/MCL (ref 2.1–9.2)
NEUTROPHILS NFR BLD AUTO: 68.9 %
PLATELET # BLD AUTO: 250 X10(3)/MCL (ref 130–400)
PMV BLD AUTO: 9.9 FL (ref 7.4–10.4)
RBC # BLD AUTO: 2.26 X10(6)/MCL (ref 4.7–6.1)
SPECIMEN OUTDATE: NORMAL
UNIT NUMBER: NORMAL
UNIT NUMBER: NORMAL
WBC # SPEC AUTO: 7.8 X10(3)/MCL (ref 4.5–11.5)

## 2023-04-28 PROCEDURE — 25000003 PHARM REV CODE 250

## 2023-04-28 PROCEDURE — 85025 COMPLETE CBC W/AUTO DIFF WBC: CPT

## 2023-04-28 PROCEDURE — P9016 RBC LEUKOCYTES REDUCED: HCPCS | Performed by: FAMILY MEDICINE

## 2023-04-28 PROCEDURE — 36430 TRANSFUSION BLD/BLD COMPNT: CPT

## 2023-04-28 PROCEDURE — 36415 COLL VENOUS BLD VENIPUNCTURE: CPT

## 2023-04-28 PROCEDURE — G0378 HOSPITAL OBSERVATION PER HR: HCPCS

## 2023-04-28 PROCEDURE — G0379 DIRECT REFER HOSPITAL OBSERV: HCPCS | Mod: 25

## 2023-04-28 PROCEDURE — 86920 COMPATIBILITY TEST SPIN: CPT | Performed by: FAMILY MEDICINE

## 2023-04-28 PROCEDURE — 86900 BLOOD TYPING SEROLOGIC ABO: CPT | Performed by: FAMILY MEDICINE

## 2023-04-28 RX ORDER — METOPROLOL SUCCINATE 50 MG/1
100 TABLET, EXTENDED RELEASE ORAL DAILY
Status: DISCONTINUED | OUTPATIENT
Start: 2023-04-28 | End: 2023-04-28 | Stop reason: HOSPADM

## 2023-04-28 RX ORDER — ACETAMINOPHEN 325 MG/1
650 TABLET ORAL ONCE
Status: CANCELLED | OUTPATIENT
Start: 2023-04-28

## 2023-04-28 RX ORDER — HYDROCODONE BITARTRATE AND ACETAMINOPHEN 500; 5 MG/1; MG/1
TABLET ORAL ONCE
Status: CANCELLED | OUTPATIENT
Start: 2023-04-28 | End: 2023-04-28

## 2023-04-28 RX ORDER — LOSARTAN POTASSIUM 50 MG/1
100 TABLET ORAL DAILY
Status: DISCONTINUED | OUTPATIENT
Start: 2023-04-28 | End: 2023-04-28 | Stop reason: HOSPADM

## 2023-04-28 RX ORDER — ATORVASTATIN CALCIUM 40 MG/1
40 TABLET, FILM COATED ORAL NIGHTLY
Status: DISCONTINUED | OUTPATIENT
Start: 2023-04-28 | End: 2023-04-28 | Stop reason: HOSPADM

## 2023-04-28 RX ORDER — HYDROCODONE BITARTRATE AND ACETAMINOPHEN 500; 5 MG/1; MG/1
TABLET ORAL ONCE
Status: COMPLETED | OUTPATIENT
Start: 2023-04-28 | End: 2023-04-28

## 2023-04-28 RX ORDER — HYDROCHLOROTHIAZIDE 25 MG/1
25 TABLET ORAL DAILY
Status: DISCONTINUED | OUTPATIENT
Start: 2023-04-28 | End: 2023-04-28 | Stop reason: HOSPADM

## 2023-04-28 RX ORDER — ACETAMINOPHEN 325 MG/1
650 TABLET ORAL ONCE
Status: COMPLETED | OUTPATIENT
Start: 2023-04-28 | End: 2023-04-28

## 2023-04-28 RX ORDER — DIPHENHYDRAMINE HCL 25 MG
25 CAPSULE ORAL ONCE
Status: CANCELLED | OUTPATIENT
Start: 2023-04-28

## 2023-04-28 RX ORDER — HYDROCODONE BITARTRATE AND ACETAMINOPHEN 500; 5 MG/1; MG/1
TABLET ORAL
Status: DISCONTINUED | OUTPATIENT
Start: 2023-04-28 | End: 2023-04-28 | Stop reason: HOSPADM

## 2023-04-28 RX ORDER — FUROSEMIDE 20 MG/1
20 TABLET ORAL DAILY
Status: DISCONTINUED | OUTPATIENT
Start: 2023-04-28 | End: 2023-04-28 | Stop reason: HOSPADM

## 2023-04-28 RX ORDER — POTASSIUM CHLORIDE 750 MG/1
10 TABLET, EXTENDED RELEASE ORAL ONCE
Status: DISCONTINUED | OUTPATIENT
Start: 2023-04-28 | End: 2023-04-28 | Stop reason: HOSPADM

## 2023-04-28 RX ORDER — DIPHENHYDRAMINE HCL 25 MG
25 CAPSULE ORAL ONCE
Status: COMPLETED | OUTPATIENT
Start: 2023-04-28 | End: 2023-04-28

## 2023-04-28 RX ORDER — AMLODIPINE BESYLATE 5 MG/1
10 TABLET ORAL DAILY
Status: DISCONTINUED | OUTPATIENT
Start: 2023-04-28 | End: 2023-04-28 | Stop reason: HOSPADM

## 2023-04-28 RX ORDER — OLMESARTAN MEDOXOMIL AND HYDROCHLOROTHIAZIDE 40/25 40; 25 MG/1; MG/1
1 TABLET ORAL DAILY
Status: DISCONTINUED | OUTPATIENT
Start: 2023-04-28 | End: 2023-04-28 | Stop reason: CLARIF

## 2023-04-28 RX ADMIN — DIPHENHYDRAMINE HYDROCHLORIDE 25 MG: 25 CAPSULE ORAL at 12:04

## 2023-04-28 RX ADMIN — SODIUM CHLORIDE: 9 INJECTION, SOLUTION INTRAVENOUS at 11:04

## 2023-04-28 RX ADMIN — ACETAMINOPHEN 650 MG: 325 TABLET ORAL at 12:04

## 2023-04-28 NOTE — PLAN OF CARE
04/28/23 1147   Discharge Assessment   Confirmed/corrected address, phone number and insurance Yes   Source of Information patient   When was your last doctors appointment? 04/13/23   Reason For Admission Anemia   People in Home alone   Do you expect to return to your current living situation? Yes   Prior to hospitilization cognitive status: Alert/Oriented   Current cognitive status: Alert/Oriented   Equipment Currently Used at Home none   Readmission within 30 days? No   Patient currently being followed by outpatient case management? No   Do you currently have service(s) that help you manage your care at home? No   Do you take prescription medications? Yes   Do you have prescription coverage? Yes   Coverage [facility] Managed MCR   Do you have any problems affording any of your prescribed medications? No   Is the patient taking medications as prescribed? yes   Who is going to help you get home at discharge? Drive Self   How do you get to doctors appointments? car, drives self   Are you on dialysis? No   Do you take coumadin? No   Discharge Plan A Home   Discharge Plan B Home Health   DME Needed Upon Discharge  none   Discharge Plan discussed with: Patient   Discharge Barriers Identified None   Physical Activity   On average, how many days per week do you engage in moderate to strenuous exercise (like a brisk walk)? 0 days   On average, how many minutes do you engage in exercise at this level? 0 min   Financial Resource Strain   How hard is it for you to pay for the very basics like food, housing, medical care, and heating? Not hard   Housing Stability   In the last 12 months, was there a time when you were not able to pay the mortgage or rent on time? N   In the last 12 months, how many places have you lived? 1   In the last 12 months, was there a time when you did not have a steady place to sleep or slept in a shelter (including now)? N   Transportation Needs   In the past 12 months, has lack of transportation kept  you from medical appointments or from getting medications? no   In the past 12 months, has lack of transportation kept you from meetings, work, or from getting things needed for daily living? No   Food Insecurity   Within the past 12 months, you worried that your food would run out before you got the money to buy more. Never true   Within the past 12 months, the food you bought just didn't last and you didn't have money to get more. Never true   Stress   Do you feel stress - tense, restless, nervous, or anxious, or unable to sleep at night because your mind is troubled all the time - these days? Not at all   Social Connections   In a typical week, how many times do you talk on the phone with family, friends, or neighbors? Twice a week   How often do you get together with friends or relatives? Never   How often do you attend Mormon or Christian services? 1 to 4   Do you belong to any clubs or organizations such as Mormon groups, unions, fraternal or athletic groups, or school groups? No   How often do you attend meetings of the clubs or organizations you belong to? Never   Are you , , , , never , or living with a partner?    Alcohol Use   Q1: How often do you have a drink containing alcohol? Never   Q2: How many drinks containing alcohol do you have on a typical day when you are drinking? None   Q3: How often do you have six or more drinks on one occasion? Never

## 2023-04-28 NOTE — NURSING
Patient received 2 units PRBC. Patient tolerated well, no reaction noted and vital signs within normal limits. Discharged to home after receiving 2 PRBC's per FNP orders.

## 2023-04-28 NOTE — HPI
63 yo BM pt of Dr. Vasquez Rodriguez sent over by [place] [facility]/onc clnic due to abnormal labs Hgb was 6.2.  Pt reports he was sent to hematology by Dr. Bee, pt has had multiple stents and cardiac issues complicated by CKD stage 4 and anemia.  He has had transfusions in the past.  He has been getting iron infusions in [place] at the center, however his labs done yesterday showed hgb 6.2 and he was contacted to come in for a blood transfusion.  I spoke with Nurse Janette Shaver and they have placed orders for observation due to no beds in our outpt infusion center he will be placed in observation to receive 2 U PRBCS and d/c home to keep f/u with his PCP next week.

## 2023-04-28 NOTE — SUBJECTIVE & OBJECTIVE
Past Medical History:   Diagnosis Date    Diabetes mellitus     Heart disease     Hypertension     Peripheral artery disease        History reviewed. No pertinent surgical history.    Review of patient's allergies indicates:   Allergen Reactions    Allergenic extract-food-shrimp Rash     All seafood      Lactose Rash    Mayonnaise Rash       No current facility-administered medications on file prior to encounter.     Current Outpatient Medications on File Prior to Encounter   Medication Sig    amLODIPine (NORVASC) 10 MG tablet Take 10 mg by mouth.    aspirin (ECOTRIN) 81 MG EC tablet Take 81 mg by mouth once daily.    clopidogreL (PLAVIX) 75 mg tablet     furosemide (LASIX) 20 MG tablet TAKE 2 TABLET BY MOUTH ONCE A DAY AS NEEDED FOR SWELLING.    glimepiride (AMARYL) 4 MG tablet Take 4 mg by mouth.    KLOR-CON 10 10 mEq TbSR Take 10 mEq by mouth.    metoprolol succinate (TOPROL-XL) 100 MG 24 hr tablet Take 100 mg by mouth.    nitroGLYCERIN (NITROSTAT) 0.4 MG SL tablet Place under the tongue.    olmesartan-hydrochlorothiazide (BENICAR HCT) 40-25 mg per tablet     rosuvastatin (CRESTOR) 40 MG Tab Take 40 mg by mouth.    sildenafiL (VIAGRA) 100 MG tablet Take 100 mg by mouth daily as needed for Erectile Dysfunction.     Family History    None       Tobacco Use    Smoking status: Former     Types: Cigarettes    Smokeless tobacco: Never   Substance and Sexual Activity    Alcohol use: Not Currently    Drug use: Never    Sexual activity: Not on file     Review of Systems  Objective:     Vital Signs (Most Recent):  Temp: 97.9 °F (36.6 °C) (04/28/23 1058)  Pulse: 75 (04/28/23 1058)  Resp: 16 (04/28/23 1058)  BP: (!) 111/50 (04/28/23 1058)  SpO2: 100 % (04/28/23 1058)   Vital Signs (24h Range):  Temp:  [97.9 °F (36.6 °C)-98.4 °F (36.9 °C)] 97.9 °F (36.6 °C)  Pulse:  [65-75] 75  Resp:  [16] 16  SpO2:  [100 %] 100 %  BP: (111-112)/(49-50) 111/50     Weight: 135.4 kg (298 lb 9.6 oz)  Body mass index is 37.32 kg/m².    Physical  Exam        Significant Labs: All pertinent labs within the past 24 hours have been reviewed.  CBC:   Recent Labs   Lab 04/28/23  0914   WBC 7.8   HGB 6.4*   HCT 21.9*        CMP: No results for input(s): NA, K, CL, CO2, GLU, BUN, CREATININE, CALCIUM, PROT, ALBUMIN, BILITOT, ALKPHOS, AST, ALT, ANIONGAP, EGFRNONAA in the last 48 hours.    Invalid input(s): ESTGFAFR[place]    Significant Imaging: I have reviewed all pertinent imaging results/findings within the past 24 hours.

## 2023-04-28 NOTE — H&P
[facility]  [facility] Medicine  History & Physical    Patient Name: Rustam Matthews  MRN: [id number]  Patient Class: OP- Observation  Admission Date: 4/28/2023  Attending Physician: Alyson Garcia MD   Primary Care Provider: Vasquez Rodriguez MD         Patient information was obtained from patient and ER records.     Subjective:     Principal Problem:<principal problem not specified>    Chief Complaint:   Chief Complaint   Patient presents with    Anemia        HPI: 61 yo BM pt of Dr. Vasquez Rodriguez sent over by [place] [facility]/onc clnic due to abnormal labs Hgb was 6.2.  Pt reports he was sent to hematology by Dr. Bee, pt has had multiple stents and cardiac issues complicated by CKD stage 4 and anemia.  He has had transfusions in the past.  He has been getting iron infusions in [place] at the center, however his labs done yesterday showed hgb 6.2 and he was contacted to come in for a blood transfusion.  I spoke with Nurse Janette Shaver and they have placed orders for observation due to no beds in our outpt infusion center he will be placed in observation to receive 2 U PRBCS and d/c home to keep f/u with his PCP next week.      Past Medical History:   Diagnosis Date    Diabetes mellitus     Heart disease     Hypertension     Peripheral artery disease        History reviewed. No pertinent surgical history.    Review of patient's allergies indicates:   Allergen Reactions    Allergenic extract-food-shrimp Rash     All seafood      Lactose Rash    Mayonnaise Rash       No current facility-administered medications on file prior to encounter.     Current Outpatient Medications on File Prior to Encounter   Medication Sig    amLODIPine (NORVASC) 10 MG tablet Take 10 mg by mouth.    aspirin (ECOTRIN) 81 MG EC tablet Take 81 mg by mouth once daily.    clopidogreL (PLAVIX) 75 mg tablet     furosemide (LASIX) 20 MG tablet TAKE 2 TABLET BY MOUTH ONCE A DAY AS NEEDED FOR SWELLING.    glimepiride  (AMARYL) 4 MG tablet Take 4 mg by mouth.    KLOR-CON 10 10 mEq TbSR Take 10 mEq by mouth.    metoprolol succinate (TOPROL-XL) 100 MG 24 hr tablet Take 100 mg by mouth.    nitroGLYCERIN (NITROSTAT) 0.4 MG SL tablet Place under the tongue.    olmesartan-hydrochlorothiazide (BENICAR HCT) 40-25 mg per tablet     rosuvastatin (CRESTOR) 40 MG Tab Take 40 mg by mouth.    sildenafiL (VIAGRA) 100 MG tablet Take 100 mg by mouth daily as needed for Erectile Dysfunction.     Family History    None       Tobacco Use    Smoking status: Former     Types: Cigarettes    Smokeless tobacco: Never   Substance and Sexual Activity    Alcohol use: Not Currently    Drug use: Never    Sexual activity: Not on file     Review of Systems  Objective:     Vital Signs (Most Recent):  Temp: 97.9 °F (36.6 °C) (04/28/23 1058)  Pulse: 75 (04/28/23 1058)  Resp: 16 (04/28/23 1058)  BP: (!) 111/50 (04/28/23 1058)  SpO2: 100 % (04/28/23 1058)   Vital Signs (24h Range):  Temp:  [97.9 °F (36.6 °C)-98.4 °F (36.9 °C)] 97.9 °F (36.6 °C)  Pulse:  [65-75] 75  Resp:  [16] 16  SpO2:  [100 %] 100 %  BP: (111-112)/(49-50) 111/50     Weight: 135.4 kg (298 lb 9.6 oz)  Body mass index is 37.32 kg/m².    Physical Exam        Significant Labs: All pertinent labs within the past 24 hours have been reviewed.  CBC:   Recent Labs   Lab 04/28/23  0914   WBC 7.8   HGB 6.4*   HCT 21.9*        CMP: No results for input(s): NA, K, CL, CO2, GLU, BUN, CREATININE, CALCIUM, PROT, ALBUMIN, BILITOT, ALKPHOS, AST, ALT, ANIONGAP, EGFRNONAA in the last 48 hours.    Invalid input(s): ESTGF[place]    Significant Imaging: I have reviewed all pertinent imaging results/findings within the past 24 hours.    Assessment/Plan:     No notes have been filed under this hospital service.  Service: Hospital Medicine    VTE Risk Mitigation (From admission, onward)    None             On 04/28/2023, patient should be placed in hospital observation services under my care.        Alyson  MD Jose  Department of Hospital Medicine  [facility]-Med/Surg

## 2023-05-04 ENCOUNTER — INFUSION (OUTPATIENT)
Dept: INFUSION THERAPY | Facility: HOSPITAL | Age: 63
End: 2023-05-04
Attending: FAMILY MEDICINE
Payer: MEDICARE

## 2023-05-04 VITALS
HEART RATE: 67 BPM | SYSTOLIC BLOOD PRESSURE: 124 MMHG | HEIGHT: 72 IN | DIASTOLIC BLOOD PRESSURE: 55 MMHG | TEMPERATURE: 98 F | RESPIRATION RATE: 18 BRPM | OXYGEN SATURATION: 100 % | WEIGHT: 296 LBS | BODY MASS INDEX: 40.09 KG/M2

## 2023-05-04 DIAGNOSIS — D50.0 IRON DEFICIENCY ANEMIA DUE TO CHRONIC BLOOD LOSS: Primary | ICD-10-CM

## 2023-05-04 PROCEDURE — 96365 THER/PROPH/DIAG IV INF INIT: CPT

## 2023-05-04 PROCEDURE — 63600175 PHARM REV CODE 636 W HCPCS: Performed by: STUDENT IN AN ORGANIZED HEALTH CARE EDUCATION/TRAINING PROGRAM

## 2023-05-04 PROCEDURE — 25000003 PHARM REV CODE 250: Performed by: STUDENT IN AN ORGANIZED HEALTH CARE EDUCATION/TRAINING PROGRAM

## 2023-05-04 RX ORDER — HEPARIN 100 UNIT/ML
500 SYRINGE INTRAVENOUS
Status: DISCONTINUED | OUTPATIENT
Start: 2023-05-04 | End: 2023-06-23

## 2023-05-04 RX ORDER — HEPARIN 100 UNIT/ML
500 SYRINGE INTRAVENOUS
Status: CANCELLED | OUTPATIENT
Start: 2023-05-11

## 2023-05-04 RX ORDER — SODIUM CHLORIDE 0.9 % (FLUSH) 0.9 %
10 SYRINGE (ML) INJECTION
Status: DISCONTINUED | OUTPATIENT
Start: 2023-05-04 | End: 2023-06-23

## 2023-05-04 RX ORDER — SODIUM CHLORIDE 0.9 % (FLUSH) 0.9 %
10 SYRINGE (ML) INJECTION
Status: CANCELLED | OUTPATIENT
Start: 2023-05-11

## 2023-05-04 RX ADMIN — IRON SUCROSE 250 MG: 20 INJECTION, SOLUTION INTRAVENOUS at 02:05

## 2023-05-18 ENCOUNTER — INFUSION (OUTPATIENT)
Dept: INFUSION THERAPY | Facility: HOSPITAL | Age: 63
End: 2023-05-18
Attending: FAMILY MEDICINE
Payer: MEDICARE

## 2023-05-18 VITALS
SYSTOLIC BLOOD PRESSURE: 103 MMHG | DIASTOLIC BLOOD PRESSURE: 66 MMHG | HEART RATE: 67 BPM | HEIGHT: 72 IN | OXYGEN SATURATION: 99 % | TEMPERATURE: 98 F | BODY MASS INDEX: 39.68 KG/M2 | WEIGHT: 293 LBS

## 2023-05-18 DIAGNOSIS — D50.0 IRON DEFICIENCY ANEMIA DUE TO CHRONIC BLOOD LOSS: Primary | ICD-10-CM

## 2023-05-18 PROCEDURE — 96365 THER/PROPH/DIAG IV INF INIT: CPT

## 2023-05-18 PROCEDURE — 63600175 PHARM REV CODE 636 W HCPCS: Performed by: STUDENT IN AN ORGANIZED HEALTH CARE EDUCATION/TRAINING PROGRAM

## 2023-05-18 PROCEDURE — 25000003 PHARM REV CODE 250: Performed by: STUDENT IN AN ORGANIZED HEALTH CARE EDUCATION/TRAINING PROGRAM

## 2023-05-18 RX ORDER — SODIUM CHLORIDE 0.9 % (FLUSH) 0.9 %
10 SYRINGE (ML) INJECTION
Status: CANCELLED | OUTPATIENT
Start: 2023-05-25

## 2023-05-18 RX ORDER — HEPARIN 100 UNIT/ML
500 SYRINGE INTRAVENOUS
Status: CANCELLED | OUTPATIENT
Start: 2023-05-25

## 2023-05-18 RX ADMIN — IRON SUCROSE 250 MG: 20 INJECTION, SOLUTION INTRAVENOUS at 02:05

## 2023-05-18 RX ADMIN — SODIUM CHLORIDE: 9 INJECTION, SOLUTION INTRAVENOUS at 02:05

## 2023-05-31 ENCOUNTER — HOSPITAL ENCOUNTER (OUTPATIENT)
Facility: HOSPITAL | Age: 63
Discharge: HOME | End: 2023-06-01
Attending: FAMILY MEDICINE | Admitting: FAMILY MEDICINE
Payer: MEDICARE

## 2023-05-31 DIAGNOSIS — D64.9 SYMPTOMATIC ANEMIA: Primary | ICD-10-CM

## 2023-05-31 DIAGNOSIS — N18.4 STAGE 4 CHRONIC KIDNEY DISEASE: ICD-10-CM

## 2023-05-31 DIAGNOSIS — R07.9 CHEST PAIN: ICD-10-CM

## 2023-05-31 LAB
ABO + RH BLD: NORMAL
ABO + RH BLD: NORMAL
ABO AND RH: NORMAL
ALBUMIN SERPL-MCNC: 3.6 G/DL (ref 3.4–5)
ALBUMIN/GLOB SERPL: 1.2 RATIO
ALP SERPL-CCNC: 71 UNIT/L (ref 50–144)
ALT SERPL-CCNC: 19 UNIT/L (ref 1–45)
ANION GAP SERPL CALC-SCNC: 9 MEQ/L (ref 2–13)
ANISOCYTOSIS BLD QL SMEAR: ABNORMAL
ANTIBODY SCREEN: NORMAL
AST SERPL-CCNC: 21 UNIT/L (ref 17–59)
BASOPHILS # BLD AUTO: 0.01 X10(3)/MCL (ref 0.01–0.08)
BASOPHILS NFR BLD AUTO: 0.1 % (ref 0.1–1.2)
BILIRUBIN DIRECT+TOT PNL SERPL-MCNC: 0.3 MG/DL (ref 0–1)
BLD PROD TYP BPU: NORMAL
BLD PROD TYP BPU: NORMAL
BLOOD UNIT EXPIRATION DATE: NORMAL
BLOOD UNIT EXPIRATION DATE: NORMAL
BLOOD UNIT TYPE CODE: 5100
BLOOD UNIT TYPE CODE: 5100
BNP BLD-MCNC: 181 PG/ML (ref 10–450)
BUN SERPL-MCNC: 57 MG/DL (ref 7–20)
CALCIUM SERPL-MCNC: 8.2 MG/DL (ref 8.4–10.2)
CHLORIDE SERPL-SCNC: 109 MMOL/L (ref 98–110)
CO2 SERPL-SCNC: 22 MMOL/L (ref 21–32)
CREAT SERPL-MCNC: 3.76 MG/DL (ref 0.66–1.25)
CREAT/UREA NIT SERPL: 15 (ref 12–20)
CROSSMATCH INTERPRETATION: NORMAL
CROSSMATCH INTERPRETATION: NORMAL
DISPENSE STATUS: NORMAL
DISPENSE STATUS: NORMAL
EOSINOPHIL # BLD AUTO: 0.15 X10(3)/MCL (ref 0.04–0.54)
EOSINOPHIL NFR BLD AUTO: 2.2 % (ref 0.7–7)
ERYTHROCYTE [DISTWIDTH] IN BLOOD BY AUTOMATED COUNT: 16.6 %
GFR SERPLBLD CREATININE-BSD FMLA CKD-EPI: 17 MLS/MIN/1.73/M2
GLOBULIN SER-MCNC: 2.9 GM/DL (ref 2–3.9)
GLUCOSE SERPL-MCNC: 144 MG/DL (ref 70–115)
HCT VFR BLD AUTO: 16.7 % (ref 36–52)
HGB BLD-MCNC: 5.1 G/DL (ref 13–18)
HYPOCHROMIA BLD QL SMEAR: ABNORMAL
IMM GRANULOCYTES # BLD AUTO: 0.02 X10(3)/MCL (ref 0–0.03)
IMM GRANULOCYTES NFR BLD AUTO: 0.3 % (ref 0–0.5)
LYMPHOCYTES # BLD AUTO: 1.42 X10(3)/MCL (ref 1.32–3.57)
LYMPHOCYTES NFR BLD AUTO: 20.5 % (ref 20–55)
MCH RBC QN AUTO: 30.4 PG (ref 27–34)
MCHC RBC AUTO-ENTMCNC: 30.5 G/DL (ref 31–37)
MCV RBC AUTO: 99.4 FL (ref 79–99)
MICROCYTES BLD QL SMEAR: ABNORMAL
MONOCYTES # BLD AUTO: 0.57 X10(3)/MCL (ref 0.3–0.82)
MONOCYTES NFR BLD AUTO: 8.2 % (ref 4.7–12.5)
NEUTROPHILS # BLD AUTO: 4.74 X10(3)/MCL (ref 1.78–5.38)
NEUTROPHILS NFR BLD AUTO: 68.7 % (ref 37–73)
NRBC BLD AUTO-RTO: 0 %
PLATELET # BLD AUTO: 228 X10(3)/MCL (ref 140–371)
PLATELET # BLD EST: ADEQUATE 10*3/UL
PMV BLD AUTO: 10 FL (ref 9.4–12.4)
POCT GLUCOSE: 165 MG/DL (ref 70–110)
POCT GLUCOSE: 188 MG/DL (ref 70–110)
POIKILOCYTOSIS BLD QL SMEAR: ABNORMAL
POTASSIUM SERPL-SCNC: 4.2 MMOL/L (ref 3.5–5.1)
PROT SERPL-MCNC: 6.5 GM/DL (ref 6.3–8.2)
RBC # BLD AUTO: 1.68 X10(6)/MCL (ref 4–6)
RBC MORPH BLD: ABNORMAL
SODIUM SERPL-SCNC: 140 MMOL/L (ref 135–145)
SPECIMEN OUTDATE: NORMAL
TROPONIN I SERPL-MCNC: 0.01 NG/ML (ref 0–0.03)
TROPONIN I SERPL-MCNC: 0.01 NG/ML (ref 0–0.03)
UNIT NUMBER: NORMAL
UNIT NUMBER: NORMAL
WBC # SPEC AUTO: 6.91 X10(3)/MCL (ref 4–11.5)

## 2023-05-31 PROCEDURE — 36430 TRANSFUSION BLD/BLD COMPNT: CPT

## 2023-05-31 PROCEDURE — 25000003 PHARM REV CODE 250

## 2023-05-31 PROCEDURE — 96374 THER/PROPH/DIAG INJ IV PUSH: CPT

## 2023-05-31 PROCEDURE — G0378 HOSPITAL OBSERVATION PER HR: HCPCS

## 2023-05-31 PROCEDURE — P9016 RBC LEUKOCYTES REDUCED: HCPCS

## 2023-05-31 PROCEDURE — 36415 COLL VENOUS BLD VENIPUNCTURE: CPT

## 2023-05-31 PROCEDURE — 99285 EMERGENCY DEPT VISIT HI MDM: CPT | Mod: 25

## 2023-05-31 PROCEDURE — 85025 COMPLETE CBC W/AUTO DIFF WBC: CPT

## 2023-05-31 PROCEDURE — 93005 ELECTROCARDIOGRAM TRACING: CPT

## 2023-05-31 PROCEDURE — 83880 ASSAY OF NATRIURETIC PEPTIDE: CPT

## 2023-05-31 PROCEDURE — 93010 ELECTROCARDIOGRAM REPORT: CPT | Mod: ,,, | Performed by: INTERNAL MEDICINE

## 2023-05-31 PROCEDURE — 84484 ASSAY OF TROPONIN QUANT: CPT | Mod: 91

## 2023-05-31 PROCEDURE — 82962 GLUCOSE BLOOD TEST: CPT

## 2023-05-31 PROCEDURE — 80053 COMPREHEN METABOLIC PANEL: CPT

## 2023-05-31 PROCEDURE — 63600175 PHARM REV CODE 636 W HCPCS

## 2023-05-31 PROCEDURE — 93010 EKG 12-LEAD: ICD-10-PCS | Mod: ,,, | Performed by: INTERNAL MEDICINE

## 2023-05-31 PROCEDURE — 86920 COMPATIBILITY TEST SPIN: CPT | Mod: 59 | Performed by: FAMILY MEDICINE

## 2023-05-31 PROCEDURE — 86900 BLOOD TYPING SEROLOGIC ABO: CPT

## 2023-05-31 PROCEDURE — 86920 COMPATIBILITY TEST SPIN: CPT | Mod: 59

## 2023-05-31 RX ORDER — DEXTROSE 40 %
15 GEL (GRAM) ORAL
Status: DISCONTINUED | OUTPATIENT
Start: 2023-05-31 | End: 2023-06-01 | Stop reason: HOSPADM

## 2023-05-31 RX ORDER — CLOPIDOGREL BISULFATE 75 MG/1
75 TABLET ORAL DAILY
Status: DISCONTINUED | OUTPATIENT
Start: 2023-06-01 | End: 2023-05-31

## 2023-05-31 RX ORDER — GLIMEPIRIDE 2 MG/1
4 TABLET ORAL
Status: DISCONTINUED | OUTPATIENT
Start: 2023-06-01 | End: 2023-06-01 | Stop reason: HOSPADM

## 2023-05-31 RX ORDER — GLUCAGON 1 MG
1 KIT INJECTION
Status: DISCONTINUED | OUTPATIENT
Start: 2023-05-31 | End: 2023-06-01 | Stop reason: HOSPADM

## 2023-05-31 RX ORDER — ATORVASTATIN CALCIUM 40 MG/1
80 TABLET, FILM COATED ORAL DAILY
Status: DISCONTINUED | OUTPATIENT
Start: 2023-06-01 | End: 2023-06-01 | Stop reason: HOSPADM

## 2023-05-31 RX ORDER — INSULIN ASPART 100 [IU]/ML
0-5 INJECTION, SOLUTION INTRAVENOUS; SUBCUTANEOUS
Status: DISCONTINUED | OUTPATIENT
Start: 2023-05-31 | End: 2023-06-01 | Stop reason: HOSPADM

## 2023-05-31 RX ORDER — ENOXAPARIN SODIUM 100 MG/ML
40 INJECTION SUBCUTANEOUS EVERY 24 HOURS
Status: DISCONTINUED | OUTPATIENT
Start: 2023-05-31 | End: 2023-05-31

## 2023-05-31 RX ORDER — ASPIRIN 81 MG/1
81 TABLET ORAL DAILY
Status: DISCONTINUED | OUTPATIENT
Start: 2023-06-01 | End: 2023-05-31

## 2023-05-31 RX ORDER — SODIUM CHLORIDE 0.9 % (FLUSH) 0.9 %
10 SYRINGE (ML) INJECTION
Status: DISCONTINUED | OUTPATIENT
Start: 2023-05-31 | End: 2023-06-01 | Stop reason: HOSPADM

## 2023-05-31 RX ORDER — HYDROCODONE BITARTRATE AND ACETAMINOPHEN 500; 5 MG/1; MG/1
TABLET ORAL
Status: DISCONTINUED | OUTPATIENT
Start: 2023-05-31 | End: 2023-06-01 | Stop reason: HOSPADM

## 2023-05-31 RX ORDER — ACETAMINOPHEN 325 MG/1
650 TABLET ORAL EVERY 8 HOURS PRN
Status: DISCONTINUED | OUTPATIENT
Start: 2023-05-31 | End: 2023-06-01 | Stop reason: HOSPADM

## 2023-05-31 RX ORDER — TALC
6 POWDER (GRAM) TOPICAL NIGHTLY PRN
Status: DISCONTINUED | OUTPATIENT
Start: 2023-05-31 | End: 2023-06-01 | Stop reason: HOSPADM

## 2023-05-31 RX ORDER — ONDANSETRON 2 MG/ML
4 INJECTION INTRAMUSCULAR; INTRAVENOUS EVERY 8 HOURS PRN
Status: DISCONTINUED | OUTPATIENT
Start: 2023-05-31 | End: 2023-06-01 | Stop reason: HOSPADM

## 2023-05-31 RX ORDER — DIPHENHYDRAMINE HYDROCHLORIDE 50 MG/ML
25 INJECTION INTRAMUSCULAR; INTRAVENOUS
Status: COMPLETED | OUTPATIENT
Start: 2023-05-31 | End: 2023-05-31

## 2023-05-31 RX ORDER — FUROSEMIDE 40 MG/1
40 TABLET ORAL DAILY
Status: DISCONTINUED | OUTPATIENT
Start: 2023-06-01 | End: 2023-06-01 | Stop reason: HOSPADM

## 2023-05-31 RX ORDER — PANTOPRAZOLE SODIUM 40 MG/1
40 TABLET, DELAYED RELEASE ORAL DAILY
Status: DISCONTINUED | OUTPATIENT
Start: 2023-06-01 | End: 2023-06-01 | Stop reason: HOSPADM

## 2023-05-31 RX ORDER — METOPROLOL SUCCINATE 50 MG/1
100 TABLET, EXTENDED RELEASE ORAL DAILY
Status: DISCONTINUED | OUTPATIENT
Start: 2023-06-01 | End: 2023-06-01 | Stop reason: HOSPADM

## 2023-05-31 RX ORDER — ACETAMINOPHEN 325 MG/1
650 TABLET ORAL
Status: COMPLETED | OUTPATIENT
Start: 2023-05-31 | End: 2023-05-31

## 2023-05-31 RX ORDER — DEXTROSE 40 %
30 GEL (GRAM) ORAL
Status: DISCONTINUED | OUTPATIENT
Start: 2023-05-31 | End: 2023-06-01 | Stop reason: HOSPADM

## 2023-05-31 RX ORDER — AMLODIPINE BESYLATE 5 MG/1
10 TABLET ORAL DAILY
Status: DISCONTINUED | OUTPATIENT
Start: 2023-06-01 | End: 2023-06-01 | Stop reason: HOSPADM

## 2023-05-31 RX ORDER — POTASSIUM CHLORIDE 750 MG/1
10 TABLET, EXTENDED RELEASE ORAL DAILY
Status: DISCONTINUED | OUTPATIENT
Start: 2023-06-01 | End: 2023-06-01 | Stop reason: HOSPADM

## 2023-05-31 RX ORDER — HYDROCHLOROTHIAZIDE 25 MG/1
25 TABLET ORAL DAILY
Status: DISCONTINUED | OUTPATIENT
Start: 2023-06-01 | End: 2023-06-01 | Stop reason: HOSPADM

## 2023-05-31 RX ADMIN — ACETAMINOPHEN 650 MG: 325 TABLET ORAL at 06:05

## 2023-05-31 RX ADMIN — DIPHENHYDRAMINE HYDROCHLORIDE 25 MG: 50 INJECTION INTRAMUSCULAR; INTRAVENOUS at 06:05

## 2023-05-31 NOTE — H&P
"Chief Complaint; chest pain over the past few days and generalized body weakness.    HPI:   Patient is a 62 y.o. morbidly obese male with a medical history of peripheral arterial disease status post stent placement, hypertension, chronic kidney disease stage 3, type 2 diabetes mellitus and chronic anemia presents to the ER on account of chest pain and generalized body weakness over the past few days.    Patient says that AS on underlying chronic anemia for which she has had multiple PRBC transfusion.  He has had appropriate workup by the gastroenterologist and hematologist.  He had colonoscopy and EGD in recent past with no remarkable findings.  He has been having IV iron transfusion by the hematologist over the past few months.  Etiology of his chronic anemia has been unclear at this time.  Patient is being considered for bone marrow biopsy by the hematologist.  Patient is planned for cardiac catheterization prior to further hematologist workup.  He has been at his usual state of health until about few days ago when he developed nonspecific retrosternal chest pain, sharp, about 6/10 in intensity radiating to the left side with no aggravating or relieving factor.  It has been intermittent.  Associated with generalized body weakness and occasional shortness of breath.  He decided to come to the ER for further evaluation.  Patient had episode of nonbloody vomiting.  No hematochezia or hematemesis.  He decided to come to the ER for further evaluation.    At the ER, hemoglobin was found to be 5.1.  Troponin was not remarkable.  PCP Vasquez Rodriguez MD MD        Past Medical History:   Diagnosis Date    Diabetes mellitus     Heart disease     Hypertension     Peripheral artery disease         Past Surgical History:   Procedure Laterality Date    PLACEMENT-STENT      pt states "in one of my legs"        (Not in a hospital admission)  Home medications; please see medication reconciliation chart.    Review of patient's " "allergies indicates:   Allergen Reactions    Allergenic extract-food-shrimp Rash     All seafood      Lactose Rash    Mayonnaise Rash        Social History     Tobacco Use    Smoking status: Former     Types: Cigarettes    Smokeless tobacco: Never   Substance Use Topics    Alcohol use: Not Currently        History reviewed. No pertinent family history.    Review of Systems  Review of Systems   Constitutional: Negative for fever.  Positive for generalized body weakness  HEENT: Negative for drooling, ear pain, facial swelling and nosebleeds.    Eyes: Negative for discharge and visual disturbance.   Respiratory: Negative for cough, negative shortness of breath.    Cardiovascular; positive for chest pain and shortness of breath  Gastrointestinal: Negative for anal bleeding and rectal pain. Negative Nausea or Vomiting.  Genitourinary: Negative for decreased urine volume and dysuria  Musculoskeletal: Negative for neck pain.   Skin: Negative for rash.   Neurological: negative for numbness, positive for weakness,negative for seizures and facial asymmetry.              Objective:     No intake/output data recorded.    /61   Pulse 60   Temp 97.6 °F (36.4 °C) (Oral)   Resp 15   Ht 6' 3" (1.905 m)   Wt 134.7 kg (297 lb)   SpO2 (!) 63%   BMI 37.12 kg/m²     General Appearance:    Awake, alert, not in acute distress   HEENT:   atraumatic, PERRL, EOM intact, conjuctiva pink, sclera anicteric, oropharynx moist, no lesions noted   Neck:    Supple, no JVD, no carotid bruits, no lymphadenopathy or thyromegaly noted       Pulmonary:   Clear to auscultation bilaterally, reduced breath sounds bileterally.   Cardiovascular:    Regular rate and rhythm, S1 S2 normal   Abdomen:     Soft, non-tender,nondistended, bowel sounds active all four quadrants, no masses, no organomegaly   Extremities:  Bilateral lower extremities edema, no cyanosis or clubbing noted       Skin:   No bruises noted.       Neurologic: Alert, awake, oriented " x 3, moves all extremities.                 Data Review :   Labs:    CBC:   Lab Results   Component Value Date    WBC 6.91 05/31/2023    RBC 1.68 (L) 05/31/2023    HGB 5.1 (LL) 05/31/2023    HCT 16.7 (LL) 05/31/2023     05/31/2023     CMP:   Lab Results   Component Value Date     05/31/2023    K 4.2 05/31/2023    CO2 22 05/31/2023    BUN 57.0 (H) 05/31/2023    CREATININE 3.76 (H) 05/31/2023    CALCIUM 8.2 (L) 05/31/2023    ALKPHOS 71 05/31/2023    AST 21 05/31/2023    ALT 19 05/31/2023    ALBUMIN 3.6 05/31/2023    BILITOT 0.3 05/31/2023     Cardiac markers: No results found for: BNP, CKMB, CKTOTAL, TROPONIN, MYOGLOBIN    Radiology:  Micro:  No components found for: BLOODCX, SPUTUMCX, URINECX    Radiology:  [unfilled]        Assessment & Plan:   1. Severe symptomatic anemia with underlying chronic anemia of unclear etiology; patient will be given 2 units of PRBC's at this time.  Monitor H&H.  Place patient on Protonix 40 mg daily.Consider Hb of 8 given the underlying  PAD with stent placement.    2. Chronic anemia (macrocytic); etiology unclear, patient has had extensive workup in the past by the gastroenterologist including EGD and colonoscopy with no remarkable findings.  Anemia workup in recent past indicates normal vitamin B12 level folic acid and ferritin level.  Patient has been planned for bone marrow biopsy as discussed with the patient.    3. History of peripheral arterial disease status post peripheral arterial stent placement.  To restart Plavix and aspirin tomorrow if the H and H are stable.    4. Type 2 diabetes mellitus; continue insulin therapy.  Monitor finger glucose serially.    5. Chest pain; likely related to symptomatic anemia, troponin level negative so far.  Continue to trend troponin.  Obtain EKG.  Place patient on telemetry.  Patient has an appointment to follow up with his cardiologist for the purpose of cardiac catheterization on an outpatient basis.    6. Hypertension; blood  pressure fairly stable.  Restart patient back on metoprolol.    7. Probable underlying chronic diastolic heart failure; continue Lasix.    8. Chronic kidney disease stage 3; monitor kidney function and avoid nephrotoxic agents.  Repeat BMP in the morning.    9. Maintain the patient on DVT prophylaxis by way of SCD        Disposition; monitor the patient's H and H after PRBC transfusion.  Patient is planned for discharge in the morning as requested by the patient  This note was completed using voice recognition software and transcription errors may occur.    This Encounter was via Telemedicine (Both audio and visual ) and from [place], [place].  Patient was located in [place].    Evaluation  of the patient was done alongside the patient's nursing staff     Patient will be placed on observation status.  Lizbeth Wilde  5/31/2023  6:17 PM

## 2023-05-31 NOTE — ED PROVIDER NOTES
"Encounter Date: 5/31/2023       History     Chief Complaint   Patient presents with    Chest Pain     Pt c/o mid sub sternal chest pain onset Saturday with no accompanying symptoms.       62-year-old black male presents to the emergency room complaining of midsternal chest pain without radiation for the last 5 days straight.  Patient also complains of shortness of breath which is worse with ambulation.  Patient has a history of angina but also history of anemia requiring multiple blood transfusions for the last being approximately 1 month ago.  Patient states he is had similar pain in the past when he needs blood.  Patient states he had 1 episode of nausea vomiting on Saturday none since.  Patient denies any abdominal pain.  Patient states he is on iron injections for his chronic anemia.    The history is provided by the patient.   Review of patient's allergies indicates:   Allergen Reactions    Allergenic extract-food-shrimp Rash     All seafood      Lactose Rash    Mayonnaise Rash     Past Medical History:   Diagnosis Date    Diabetes mellitus     Heart disease     Hypertension     Peripheral artery disease      Past Surgical History:   Procedure Laterality Date    PLACEMENT-STENT      pt states "in one of my legs"     History reviewed. No pertinent family history.  Social History     Tobacco Use    Smoking status: Former     Types: Cigarettes    Smokeless tobacco: Never   Substance Use Topics    Alcohol use: Not Currently    Drug use: Never     Review of Systems   Respiratory:  Positive for shortness of breath.    Cardiovascular:  Positive for chest pain.   All other systems reviewed and are negative.    Physical Exam     Initial Vitals [05/31/23 1632]   BP Pulse Resp Temp SpO2   (!) 118/45 82 18 97.6 °F (36.4 °C) 97 %      MAP       --         Physical Exam    Nursing note and vitals reviewed.  Constitutional:   Moderately obese black male alert in no acute distress.   HENT:   Head is atraumatic and " normocephalic.  Oropharynx is clear moist mucous membranes.  Nose is clear with no discharge.   Neck:   Neck is supple full range of motion with no cervical lymphadenopathy.   Cardiovascular:            Heart is bradycardic regular rate and rhythm with no murmur.   Pulmonary/Chest:   Lungs are clear to auscultation bilaterally.   Abdominal:   Abdomen positive bowel sounds throughout.  Abdomen soft and nontender with no guarding or rebound.   Musculoskeletal:      Comments: Extremities with full range of motion throughout.  1+ pretibial edema bilateral lower extremities.  No clubbing or cyanosis noted.     Neurological:   Patient is alert and oriented x3.  Bilateral upper and lower extremities normal sensation and strength.   Skin:   Skin is warm and dry.  Nail beds are very pale.   Psychiatric: He has a normal mood and affect. His behavior is normal. Thought content normal.       ED Course   Procedures  Labs Reviewed   COMPREHENSIVE METABOLIC PANEL - Abnormal; Notable for the following components:       Result Value    Glucose Level 144 (*)     Blood Urea Nitrogen 57.0 (*)     Creatinine 3.76 (*)     Calcium Level Total 8.2 (*)     All other components within normal limits   CBC WITH DIFFERENTIAL - Abnormal; Notable for the following components:    RBC 1.68 (*)     Hgb 5.1 (*)     Hct 16.7 (*)     MCV 99.4 (*)     MCHC 30.5 (*)     All other components within normal limits   BLOOD SMEAR MICROSCOPIC EXAM ([facility]) - Abnormal; Notable for the following components:    RBC Morph Abnormal (*)     Anisocyte 1+ (*)     Hypochrom 1+ (*)     Microcyte 1+ (*)     Poik 1+ (*)     All other components within normal limits   POCT GLUCOSE - Abnormal; Notable for the following components:    POCT Glucose 165 (*)     All other components within normal limits   TROPONIN I - Normal   NT-PRO NATRIURETIC PEPTIDE - Normal   CBC W/ AUTO DIFFERENTIAL    Narrative:     The following orders were created for panel order CBC auto  differential.  Procedure                               Abnormality         Status                     ---------                               -----------         ------                     CBC with Differential[[id number]]        Abnormal            Final result                 Please view results for these tests on the individual orders.   OCCULT BLOOD,STOOL,DIAGNOSTIC (1-3)    Narrative:     The following orders were created for panel order Occult Blood, Stool, Diagnostic (1-3).  Procedure                               Abnormality         Status                     ---------                               -----------         ------                     Occult blood x 3, stool[[id number]]                                                       Please view results for these tests on the individual orders.   OCCULT BLOOD X 3, STOOL   TYPE & SCREEN     EKG Readings: (Independently Interpreted)   Initial Reading: No STEMI. Rhythm: Sinus Bradycardia. Heart Rate: 58. Ectopy: No Ectopy. Conduction: Normal. ST Segments: Normal ST Segments. T Waves: Normal. Axis: Normal. Clinical Impression: Sinus Bradycardia   ECG Results              EKG 12-lead (Preliminary result)  Result time 05/31/23 17:09:33      Wet Read by Kenny Blair MD (05/31/23 17:09:33, [facility], Emergency Medicine)    Sinus Jim, HR 58, nml axis, nml intervals, nml ST-T waves, No STEMI                                  Imaging Results              X-Ray Chest AP Portable (Final result)  Result time 05/31/23 19:06:31      Final result by Deepthi June III, MD (05/31/23 19:06:31)                   Impression:      1. I see no lobar or segmental infiltrates or other significant abnormalities.      Electronically signed by: Deepthi June  Date:    05/31/2023  Time:    19:06               Narrative:    EXAMINATION:  STUDY: XR CHEST AP PORTABLE    CLINICAL HISTORY AND TECHNIQUE:  RT Karl on 5/31/2023  6:45 PM    CURRENT CLINICAL  HISTORY: ER PT    X 5 DAYS    C/O MID SUBSTERNAL CHEST PAIN    PMH: DIABETES, HEART DISEASE, HTN, PAD, STENT PLACEMENT UNSPECIFIED    TECHNIQUE:  PORTABLE CHEST 1VIEW    TECHNOLOGIST: ARYAN    COMPARISON:  12/11/2022    FINDINGS:  The lungs are slightly under expanded.The cardiac, hilar, and mediastinal contours appear unremarkable.I see no lobar or segmental infiltrates.No significant pleural effusions are noted.Mild degenerative changes are noted throughout the thoracic spine.                                       Medications   0.9%  NaCl infusion (for blood administration) (has no administration in time range)   amLODIPine tablet 10 mg (has no administration in time range)   furosemide tablet 40 mg (has no administration in time range)   glimepiride tablet 4 mg (has no administration in time range)   glucagon (human recombinant) injection 1 mg (has no administration in time range)   dextrose 10% bolus 125 mL 125 mL (has no administration in time range)   dextrose 10% bolus 250 mL 250 mL (has no administration in time range)   dextrose 40 % gel 15,000 mg (has no administration in time range)   dextrose 40 % gel 30,000 mg (has no administration in time range)   insulin aspart U-100 pen 0-5 Units (has no administration in time range)   potassium chloride SA CR tablet 10 mEq (has no administration in time range)   metoprolol succinate (TOPROL-XL) 24 hr tablet 100 mg (has no administration in time range)   atorvastatin tablet 80 mg (has no administration in time range)   hydroCHLOROthiazide tablet 25 mg (has no administration in time range)   sodium chloride 0.9% flush 10 mL (has no administration in time range)   melatonin tablet 6 mg (has no administration in time range)   acetaminophen tablet 650 mg (has no administration in time range)   ondansetron injection 4 mg (has no administration in time range)   pantoprazole EC tablet 40 mg (has no administration in time range)   acetaminophen tablet 650 mg (650 mg Oral Given  5/31/23 7330)   diphenhydrAMINE injection 25 mg (25 mg Intravenous Given 5/31/23 1841)     Medical Decision Making:   Initial Assessment:   Chest pain  Differential Diagnosis:   Angina, anemia, ACS  Clinical Tests:   Lab Tests: Ordered  Radiological Study: Ordered  Medical Tests: Ordered and Reviewed                        Clinical Impression:   Final diagnoses:  [R07.9] Chest pain  [D64.9] Symptomatic anemia (Primary)        ED Disposition Condition    Observation Stable                Joesph Wang MD  06/01/23 1052

## 2023-05-31 NOTE — ED PROVIDER NOTES
"Encounter Date: 5/31/2023       History     Chief Complaint   Patient presents with    Chest Pain     Pt c/o mid sub sternal chest pain onset Saturday with no accompanying symptoms.       HPI  Review of patient's allergies indicates:   Allergen Reactions    Allergenic extract-food-shrimp Rash     All seafood      Lactose Rash    Mayonnaise Rash     Past Medical History:   Diagnosis Date    Diabetes mellitus     Heart disease     Hypertension     Peripheral artery disease      Past Surgical History:   Procedure Laterality Date    PLACEMENT-STENT      pt states "in one of my legs"     History reviewed. No pertinent family history.  Social History     Tobacco Use    Smoking status: Former     Types: Cigarettes    Smokeless tobacco: Never   Substance Use Topics    Alcohol use: Not Currently    Drug use: Never     Review of Systems    Physical Exam     Initial Vitals [05/31/23 1632]   BP Pulse Resp Temp SpO2   (!) 118/45 82 18 97.6 °F (36.4 °C) 97 %      MAP       --         Physical Exam    ED Course   Procedures  Labs Reviewed   COMPREHENSIVE METABOLIC PANEL - Abnormal; Notable for the following components:       Result Value    Glucose Level 144 (*)     Blood Urea Nitrogen 57.0 (*)     Creatinine 3.76 (*)     Calcium Level Total 8.2 (*)     All other components within normal limits   CBC WITH DIFFERENTIAL - Abnormal; Notable for the following components:    RBC 1.68 (*)     Hgb 5.1 (*)     Hct 16.7 (*)     MCV 99.4 (*)     MCHC 30.5 (*)     All other components within normal limits   POCT GLUCOSE - Abnormal; Notable for the following components:    POCT Glucose 165 (*)     All other components within normal limits   TROPONIN I - Normal   NT-PRO NATRIURETIC PEPTIDE - Normal   CBC W/ AUTO DIFFERENTIAL    Narrative:     The following orders were created for panel order CBC auto differential.  Procedure                               Abnormality         Status                     ---------                               " -----------         ------                     CBC with Differential[[id number]]        Abnormal            Final result                 Please view results for these tests on the individual orders.   BLOOD SMEAR MICROSCOPIC EXAM ([facility])   TYPE & SCREEN   PREPARE RBC SOFT        ECG Results              EKG 12-lead (Preliminary result)  Result time 05/31/23 17:09:33      Wet Read by Kenny Blair MD (05/31/23 17:09:33, [facility], Emergency Medicine)    Sinus Jim, HR 58, nml axis, nml intervals, nml ST-T waves, No STEMI                                  Imaging Results    None          Medications   0.9%  NaCl infusion (for blood administration) (has no administration in time range)   acetaminophen tablet 650 mg (has no administration in time range)   diphenhydrAMINE injection 25 mg (has no administration in time range)     Medical Decision Making:   Patient has transfusion-dependent anemia, from unknown etiology.  He says every time his legs and chest start hurting, it was because he needs a transfusion.  He once again has symptomatic anemia, and we will need to be admitted for transfusion of 2 units of packed red blood cells.                        Clinical Impression:   Final diagnoses:  [R07.9] Chest pain  [D64.9] Symptomatic anemia (Primary)        ED Disposition Condition    Observation Stable                Kenny Blair MD  05/31/23 4288

## 2023-06-01 VITALS
WEIGHT: 293.63 LBS | SYSTOLIC BLOOD PRESSURE: 122 MMHG | TEMPERATURE: 98 F | HEART RATE: 59 BPM | RESPIRATION RATE: 20 BRPM | BODY MASS INDEX: 36.51 KG/M2 | DIASTOLIC BLOOD PRESSURE: 65 MMHG | OXYGEN SATURATION: 98 % | HEIGHT: 75 IN

## 2023-06-01 LAB
ABO + RH BLD: NORMAL
ABO + RH BLD: NORMAL
ANION GAP SERPL CALC-SCNC: 9 MEQ/L (ref 2–13)
BLD PROD TYP BPU: NORMAL
BLD PROD TYP BPU: NORMAL
BLOOD UNIT EXPIRATION DATE: NORMAL
BLOOD UNIT EXPIRATION DATE: NORMAL
BLOOD UNIT TYPE CODE: 5100
BLOOD UNIT TYPE CODE: 5100
BUN SERPL-MCNC: 54 MG/DL (ref 7–20)
CALCIUM SERPL-MCNC: 8.1 MG/DL (ref 8.4–10.2)
CHLORIDE SERPL-SCNC: 108 MMOL/L (ref 98–110)
CO2 SERPL-SCNC: 23 MMOL/L (ref 21–32)
CREAT SERPL-MCNC: 3.59 MG/DL (ref 0.66–1.25)
CREAT/UREA NIT SERPL: 15 (ref 12–20)
CROSSMATCH INTERPRETATION: NORMAL
CROSSMATCH INTERPRETATION: NORMAL
DISPENSE STATUS: NORMAL
DISPENSE STATUS: NORMAL
ERYTHROCYTE [DISTWIDTH] IN BLOOD BY AUTOMATED COUNT: 16.7 %
GFR SERPLBLD CREATININE-BSD FMLA CKD-EPI: 18 MLS/MIN/1.73/M2
GLUCOSE SERPL-MCNC: 103 MG/DL (ref 70–115)
GLUCOSE SERPL-MCNC: 119 MG/DL (ref 70–110)
HCT VFR BLD AUTO: 20 % (ref 36–52)
HCT VFR BLD AUTO: 20.3 % (ref 36–52)
HCT VFR BLD AUTO: 29.3 % (ref 36–52)
HGB BLD-MCNC: 6.3 G/DL (ref 13–18)
HGB BLD-MCNC: 6.6 G/DL (ref 13–18)
HGB BLD-MCNC: 9.7 G/DL (ref 13–18)
MCH RBC QN AUTO: 30.7 PG (ref 27–34)
MCHC RBC AUTO-ENTMCNC: 32.5 G/DL (ref 31–37)
MCV RBC AUTO: 94.4 FL (ref 79–99)
NRBC BLD AUTO-RTO: 0 %
PLATELET # BLD AUTO: 197 X10(3)/MCL (ref 140–371)
PMV BLD AUTO: 9.5 FL (ref 9.4–12.4)
POCT GLUCOSE: 110 MG/DL (ref 70–110)
POCT GLUCOSE: 119 MG/DL (ref 70–110)
POCT GLUCOSE: 161 MG/DL (ref 70–110)
POTASSIUM SERPL-SCNC: 3.8 MMOL/L (ref 3.5–5.1)
RBC # BLD AUTO: 2.15 X10(6)/MCL (ref 4–6)
SODIUM SERPL-SCNC: 140 MMOL/L (ref 135–145)
TROPONIN I SERPL-MCNC: 0.02 NG/ML (ref 0–0.03)
UNIT NUMBER: NORMAL
UNIT NUMBER: NORMAL
WBC # SPEC AUTO: 6.51 X10(3)/MCL (ref 4–11.5)

## 2023-06-01 PROCEDURE — 84484 ASSAY OF TROPONIN QUANT: CPT

## 2023-06-01 PROCEDURE — 80048 BASIC METABOLIC PNL TOTAL CA: CPT | Performed by: INTERNAL MEDICINE

## 2023-06-01 PROCEDURE — 25000003 PHARM REV CODE 250: Performed by: INTERNAL MEDICINE

## 2023-06-01 PROCEDURE — P9016 RBC LEUKOCYTES REDUCED: HCPCS | Performed by: FAMILY MEDICINE

## 2023-06-01 PROCEDURE — 25000003 PHARM REV CODE 250

## 2023-06-01 PROCEDURE — 85027 COMPLETE CBC AUTOMATED: CPT | Performed by: INTERNAL MEDICINE

## 2023-06-01 PROCEDURE — 36415 COLL VENOUS BLD VENIPUNCTURE: CPT | Performed by: INTERNAL MEDICINE

## 2023-06-01 PROCEDURE — G0378 HOSPITAL OBSERVATION PER HR: HCPCS

## 2023-06-01 PROCEDURE — 85014 HEMATOCRIT: CPT | Mod: 91 | Performed by: FAMILY MEDICINE

## 2023-06-01 PROCEDURE — 85014 HEMATOCRIT: CPT | Mod: 91 | Performed by: INTERNAL MEDICINE

## 2023-06-01 RX ORDER — HYDROCODONE BITARTRATE AND ACETAMINOPHEN 500; 5 MG/1; MG/1
TABLET ORAL
Status: DISCONTINUED | OUTPATIENT
Start: 2023-06-01 | End: 2023-06-01 | Stop reason: HOSPADM

## 2023-06-01 RX ADMIN — PANTOPRAZOLE SODIUM 40 MG: 40 TABLET, DELAYED RELEASE ORAL at 08:06

## 2023-06-01 RX ADMIN — POTASSIUM CHLORIDE 10 MEQ: 750 TABLET, EXTENDED RELEASE ORAL at 08:06

## 2023-06-01 RX ADMIN — HYDROCHLOROTHIAZIDE 25 MG: 25 TABLET ORAL at 08:06

## 2023-06-01 RX ADMIN — METOPROLOL SUCCINATE 100 MG: 50 TABLET, EXTENDED RELEASE ORAL at 08:06

## 2023-06-01 RX ADMIN — ATORVASTATIN CALCIUM 80 MG: 40 TABLET, FILM COATED ORAL at 08:06

## 2023-06-01 RX ADMIN — AMLODIPINE BESYLATE 10 MG: 5 TABLET ORAL at 08:06

## 2023-06-01 RX ADMIN — FUROSEMIDE 40 MG: 40 TABLET ORAL at 08:06

## 2023-06-01 RX ADMIN — GLIMEPIRIDE 4 MG: 2 TABLET ORAL at 07:06

## 2023-06-01 NOTE — PLAN OF CARE
Problem: Adult Inpatient Plan of Care  Goal: Plan of Care Review  Outcome: Met  Goal: Patient-Specific Goal (Individualized)  Outcome: Met  Goal: Absence of Hospital-Acquired Illness or Injury  Outcome: Met  Goal: Optimal Comfort and Wellbeing  Outcome: Met  Goal: Readiness for Transition of Care  Outcome: Met     Problem: Anemia  Goal: Anemia Symptom Improvement  Outcome: Met

## 2023-06-01 NOTE — PLAN OF CARE
Problem: Adult Inpatient Plan of Care  Goal: Plan of Care Review  Outcome: Ongoing, Not Progressing  Goal: Patient-Specific Goal (Individualized)  Outcome: Ongoing, Not Progressing  Goal: Absence of Hospital-Acquired Illness or Injury  Outcome: Ongoing, Not Progressing  Goal: Optimal Comfort and Wellbeing  Outcome: Ongoing, Not Progressing  Goal: Readiness for Transition of Care  Outcome: Ongoing, Not Progressing     Problem: Anemia  Goal: Anemia Symptom Improvement  Outcome: Ongoing, Not Progressing

## 2023-06-01 NOTE — HPI
Patient is a 62 y.o. morbidly obese male with a medical history of peripheral arterial disease status post stent placement, hypertension, chronic kidney disease stage 3, type 2 diabetes mellitus and chronic anemia presents to the ER on account of chest pain and generalized body weakness over the past few days.    Patient says that AS on underlying chronic anemia for which she has had multiple PRBC transfusion.  He has had appropriate workup by the gastroenterologist and hematologist.  He had colonoscopy and EGD in recent past with no remarkable findings.  He has been having IV iron transfusion by the hematologist over the past few months.  Etiology of his chronic anemia has been unclear at this time.  Patient is being considered for bone marrow biopsy by the hematologist.  Patient is planned for cardiac catheterization prior to further hematologist workup.  He has been at his usual state of health until about few days ago when he developed nonspecific retrosternal chest pain, sharp, about 6/10 in intensity radiating to the left side with no aggravating or relieving factor.  It has been intermittent.  Associated with generalized body weakness and occasional shortness of breath.  He decided to come to the ER for further evaluation.  Patient had episode of nonbloody vomiting.  No hematochezia or hematemesis.  He decided to come to the ER for further evaluation.    At the ER, hemoglobin was found to be 5.1.  Troponin was not remarkable.  PCP Vasquez Rodriguez MD MD       Pulmonology

## 2023-06-01 NOTE — DISCHARGE SUMMARY
[facility]/Surg  [facility] Medicine  Discharge Summary      Patient Name: Rustam Matthews  MRN: [id number]  [facility]: [id number]  Patient Class: OP- Observation  Admission Date: 5/31/2023  Hospital Length of Stay: 0 days  Discharge Date and Time:  06/01/2023 11:07 AM  Attending Physician: Alyson Garcia MD   Discharging Provider: Alyson Garcia MD  Primary Care Provider: Vasquez Rodriguez MD    Primary Care Team: Networked reference to record PCT     HPI:   Patient is a 62 y.o. morbidly obese male with a medical history of peripheral arterial disease status post stent placement, hypertension, chronic kidney disease stage 3, type 2 diabetes mellitus and chronic anemia presents to the ER on account of chest pain and generalized body weakness over the past few days.    Patient says that AS on underlying chronic anemia for which she has had multiple PRBC transfusion.  He has had appropriate workup by the gastroenterologist and hematologist.  He had colonoscopy and EGD in recent past with no remarkable findings.  He has been having IV iron transfusion by the hematologist over the past few months.  Etiology of his chronic anemia has been unclear at this time.  Patient is being considered for bone marrow biopsy by the hematologist.  Patient is planned for cardiac catheterization prior to further hematologist workup.  He has been at his usual state of health until about few days ago when he developed nonspecific retrosternal chest pain, sharp, about 6/10 in intensity radiating to the left side with no aggravating or relieving factor.  It has been intermittent.  Associated with generalized body weakness and occasional shortness of breath.  He decided to come to the ER for further evaluation.  Patient had episode of nonbloody vomiting.  No hematochezia or hematemesis.  He decided to come to the ER for further evaluation.    At the ER, hemoglobin was found to be 5.1.  Troponin was not remarkable.  PCP Vasquez Rodriguez,  MD ESCOBEDO          * No surgery found *      Hospital Course:   06/01/2023 pt admitted with recurrent anemia, he is followed by Dr. Vasquez Rodriguez and the Oncology/infusion clninic in [place].  He presented with Hgb of 5 and received 2 U already overnight.  Discussed with pt he has underlying CRI and this is worsening his recurrent anemia.  We called Dr. Olson's office and pt has not been seen there since September of last year.  May have refused to do lab work and they would not make him an appointment without the labs.  Staff here will fax over current labs for Dr. Olson's review and they will contact him with an appointment.  He will recive 4 U total and will f/u with PCP Dr. Vasquez Rodriguez to discuss further care.  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  He will likely need an erythropoesis stimulating agent will defer to hematology and nephrology.  He has a f/u with oncology on 06/22/2023 already schedule       Goals of Care Treatment Preferences:  Code Status: Full Code      Consults:     No new Assessment & Plan notes have been filed under this hospital service since the last note was generated.  Service: Hospital Medicine    Final Active Diagnoses:    Diagnosis Date Noted POA    PRINCIPAL PROBLEM:  Iron deficiency anemia due to chronic blood loss [D50.0] 04/13/2023 Yes    Chronic anemia [D64.9] 03/21/2023 Yes    Stage 4 chronic kidney disease [N18.4] 03/21/2023 Yes    Chest pain [R07.9] 05/31/2023 Yes      Problems Resolved During this Admission:       Discharged Condition: good    Disposition: Home or Self Care    Follow Up:   Follow-up Information     Leon Olson MD Follow up.    Specialty: Nephrology  Contact information:  [facility] [facility] [address]  [phone]             Vasquez Rodriguez MD .    Specialty: Family Medicine  Contact information:  [address] [place] [address]  [place] [address]  [phone]             Elizabeth K Lejeune, MD. Go on 6/22/2023.    Specialties: Oncology, Hematology and Oncology  Why: keep appointment on 06/22/2023  Contact information:  [place] [address]  [phone]                       Patient Instructions:      Nursing communication   Order Comments: D/c when blood is completed     Activity as tolerated       Significant Diagnostic Studies: Labs:   BMP:   Recent Labs   Lab 05/31/23 1655 06/01/23 0452    140   K 4.2 3.8   CO2 22 23   BUN 57.0* 54.0*   CREATININE 3.76* 3.59*   CALCIUM 8.2* 8.1*    and CMP   Recent Labs   Lab 05/31/23 1655 06/01/23 0452    140   K 4.2 3.8   CO2 22 23   BUN 57.0* 54.0*   CREATININE 3.76* 3.59*   CALCIUM 8.2* 8.1*   ALBUMIN 3.6  --    BILITOT 0.3  --    ALKPHOS 71  --    AST 21  --    ALT 19  --        Pending Diagnostic Studies:     Procedure Component Value Units Date/Time    Occult Blood, Stool, Diagnostic (1-3) [[id number]]     Order Status: Sent Lab Status: No result     Specimen: Stool     Narrative:      The following orders were created for panel order Occult Blood, Stool, Diagnostic (1-3).  Procedure                               Abnormality         Status                     ---------                               -----------         ------                     Occult blood x 3, stool[[id number]]                                                       Please view results for these tests on the individual orders.    Occult blood x 3, stool [[id number]]     Order Status: Sent Lab Status: No result     Specimen: Stool          Medications:  Reconciled Home Medications:      Medication List      CONTINUE taking these medications    amLODIPine 10 MG tablet  Commonly known as: NORVASC  Take 10 mg by mouth.     aspirin 81 MG EC tablet  Commonly known as: ECOTRIN  Take 81 mg by mouth once daily.     clopidogreL 75 mg tablet  Commonly known as: PLAVIX     furosemide 20 MG tablet  Commonly  known as: LASIX  TAKE 2 TABLET BY MOUTH ONCE A DAY AS NEEDED FOR SWELLING.     glimepiride 4 MG tablet  Commonly known as: AMARYL  Take 4 mg by mouth.     KLOR-CON 10 10 MEQ Tbsr  Generic drug: potassium chloride  Take 10 mEq by mouth.     metoprolol succinate 100 MG 24 hr tablet  Commonly known as: TOPROL-XL  Take 100 mg by mouth.     nitroGLYCERIN 0.4 MG SL tablet  Commonly known as: NITROSTAT  Place under the tongue.     olmesartan-hydrochlorothiazide 40-25 mg per tablet  Commonly known as: BENICAR HCT     rosuvastatin 40 MG Tab  Commonly known as: CRESTOR  Take 40 mg by mouth.     sildenafiL 100 MG tablet  Commonly known as: VIAGRA  Take 100 mg by mouth daily as needed for Erectile Dysfunction.            Indwelling Lines/Drains at time of discharge:   Lines/Drains/Airways     None                 Time spent on the discharge of patient: 37 minutes    Physical Exam  Vitals and nursing note reviewed.   Constitutional:       General: He is not in acute distress.     Appearance: Normal appearance. He is normal weight. He is not ill-appearing.   HENT:      Head: Normocephalic and atraumatic.   Cardiovascular:      Rate and Rhythm: Normal rate and regular rhythm.      Pulses: Normal pulses.      Heart sounds: Normal heart sounds.   Pulmonary:      Effort: Pulmonary effort is normal.      Breath sounds: Normal breath sounds.   Abdominal:      General: Abdomen is flat. Bowel sounds are normal.      Palpations: Abdomen is soft.   Skin:     General: Skin is warm and dry.      Findings: No erythema or rash.   Neurological:      Mental Status: He is alert.   Psychiatric:      Comments: I had a face to face encounter with this patient prior to discharging            Alyson Garcia MD  Department of Hospital Medicine  [facility]/Surg

## 2023-06-01 NOTE — HOSPITAL COURSE
06/01/2023 pt admitted with recurrent anemia, he is followed by Dr. Vasquez Rodriguez and the Oncology/infusion clninic in [place].  He presented with Hgb of 5 and received 2 U already overnight.  Discussed with pt he has underlying CRI and this is worsening his recurrent anemia.  We called Dr. Olson's office and pt has not been seen there since September of last year.  May have refused to do lab work and they would not make him an appointment without the labs.  Staff here will fax over current labs for Dr. Olson's review and they will contact him with an appointment.  He will recive 4 U total and will f/u with PCP Dr. Vasquez Rodriguez to discuss further care.  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  He will likely need an erythropoesis stimulating agent will defer to hematology and nephrology.  He has a f/u with oncology on 06/22/2023 already schedule

## 2023-06-01 NOTE — PLAN OF CARE
06/01/23 1343   Final Note   Assessment Type Final Discharge Note   Anticipated Discharge Disposition Home   What phone number can be called within the next 1-3 days to see how you are doing after discharge? [phone]   Post-Acute Status   Discharge Delays None known at this time

## 2023-06-01 NOTE — PLAN OF CARE
06/01/23 1044   Discharge Assessment   Assessment Type Discharge Planning Assessment   Confirmed/corrected address, phone number and insurance Yes   Confirmed Demographics Correct on Facesheet   Source of Information patient   When was your last doctors appointment?   (does not remember,  Sept 2022)   Does patient/caregiver understand observation status Yes   Communicated YAIMA with patient/caregiver Yes   Reason For Admission anemia   People in Home alone   Do you expect to return to your current living situation? Yes   Do you have help at home or someone to help you manage your care at home? No   Prior to hospitilization cognitive status: Alert/Oriented   Current cognitive status: Alert/Oriented   Equipment Currently Used at Home none   Readmission within 30 days? No   Patient currently being followed by outpatient case management? No   Do you currently have service(s) that help you manage your care at home? No   Do you take prescription medications? Yes   Do you have prescription coverage? Yes   Coverage [facility]   Do you have any problems affording any of your prescribed medications? No   Is the patient taking medications as prescribed? yes   Who is going to help you get home at discharge? friend   How do you get to doctors appointments? car, drives self   Are you on dialysis? No   Do you take coumadin? No   Discharge Plan A Home   DME Needed Upon Discharge  none   Discharge Plan discussed with: Patient   Transition of Care Barriers Does not adhere to care plan   Physical Activity   On average, how many days per week do you engage in moderate to strenuous exercise (like a brisk walk)? 0 days   On average, how many minutes do you engage in exercise at this level? 0 min   Financial Resource Strain   How hard is it for you to pay for the very basics like food, housing, medical care, and heating? Not hard   Housing Stability   In the last 12 months, was there a time when you were not able to pay the  mortgage or rent on time? N   In the last 12 months, how many places have you lived? 1   In the last 12 months, was there a time when you did not have a steady place to sleep or slept in a shelter (including now)? N   Transportation Needs   In the past 12 months, has lack of transportation kept you from medical appointments or from getting medications? no   In the past 12 months, has lack of transportation kept you from meetings, work, or from getting things needed for daily living? No   Food Insecurity   Within the past 12 months, you worried that your food would run out before you got the money to buy more. Never true   Within the past 12 months, the food you bought just didn't last and you didn't have money to get more. Never true   Stress   Do you feel stress - tense, restless, nervous, or anxious, or unable to sleep at night because your mind is troubled all the time - these days? Not at all   Social Connections   In a typical week, how many times do you talk on the phone with family, friends, or neighbors? Twice a week   How often do you get together with friends or relatives? Never   Do you belong to any clubs or organizations such as Jehovah's witness groups, unions, fraternal or athletic groups, or school groups? No   How often do you attend meetings of the clubs or organizations you belong to? Never   Are you , , , , never , or living with a partner?    Alcohol Use   Q1: How often do you have a drink containing alcohol? Never   Q2: How many drinks containing alcohol do you have on a typical day when you are drinking? None   Q3: How often do you have six or more drinks on one occasion? Never

## 2023-06-16 ENCOUNTER — LAB VISIT (OUTPATIENT)
Dept: LAB | Facility: HOSPITAL | Age: 63
End: 2023-06-16
Attending: STUDENT IN AN ORGANIZED HEALTH CARE EDUCATION/TRAINING PROGRAM
Payer: MEDICARE

## 2023-06-16 DIAGNOSIS — D64.9 CHRONIC ANEMIA: ICD-10-CM

## 2023-06-16 LAB
ALBUMIN SERPL-MCNC: 3.6 G/DL (ref 3.4–5)
ALBUMIN/GLOB SERPL: 1.1 RATIO
ALP SERPL-CCNC: 80 UNIT/L (ref 50–144)
ALT SERPL-CCNC: 17 UNIT/L (ref 1–45)
ANION GAP SERPL CALC-SCNC: 11 MEQ/L (ref 2–13)
AST SERPL-CCNC: 19 UNIT/L (ref 17–59)
BASOPHILS # BLD AUTO: 0.02 X10(3)/MCL (ref 0.01–0.08)
BASOPHILS NFR BLD AUTO: 0.3 % (ref 0.1–1.2)
BILIRUBIN DIRECT+TOT PNL SERPL-MCNC: 0.2 MG/DL (ref 0–1)
BUN SERPL-MCNC: 43 MG/DL (ref 7–20)
CALCIUM SERPL-MCNC: 8.1 MG/DL (ref 8.4–10.2)
CHLORIDE SERPL-SCNC: 110 MMOL/L (ref 98–110)
CO2 SERPL-SCNC: 19 MMOL/L (ref 21–32)
CREAT SERPL-MCNC: 3.36 MG/DL (ref 0.66–1.25)
CREAT/UREA NIT SERPL: 13 (ref 12–20)
EOSINOPHIL # BLD AUTO: 0.12 X10(3)/MCL (ref 0.04–0.54)
EOSINOPHIL NFR BLD AUTO: 1.7 % (ref 0.7–7)
ERYTHROCYTE [DISTWIDTH] IN BLOOD BY AUTOMATED COUNT: 15.7 % (ref 11.5–17)
FERRITIN SERPL-MCNC: 16.9 NG/ML (ref 17.9–464)
GFR SERPLBLD CREATININE-BSD FMLA CKD-EPI: 20 MLS/MIN/1.73/M2
GLOBULIN SER-MCNC: 3.2 GM/DL (ref 2–3.9)
GLUCOSE SERPL-MCNC: 159 MG/DL (ref 70–115)
HCT VFR BLD AUTO: 23.9 % (ref 36–52)
HGB BLD-MCNC: 7.6 G/DL (ref 13–18)
IMM GRANULOCYTES # BLD AUTO: 0.02 X10(3)/MCL (ref 0–0.03)
IMM GRANULOCYTES NFR BLD AUTO: 0.3 % (ref 0–0.5)
LYMPHOCYTES # BLD AUTO: 1.19 X10(3)/MCL (ref 1.32–3.57)
LYMPHOCYTES NFR BLD AUTO: 16.4 % (ref 20–55)
MCH RBC QN AUTO: 30 PG (ref 27–34)
MCHC RBC AUTO-ENTMCNC: 31.8 G/DL (ref 31–37)
MCV RBC AUTO: 94.5 FL (ref 79–99)
MONOCYTES # BLD AUTO: 0.32 X10(3)/MCL (ref 0.3–0.82)
MONOCYTES NFR BLD AUTO: 4.4 % (ref 4.7–12.5)
NEUTROPHILS # BLD AUTO: 5.6 X10(3)/MCL (ref 1.78–5.38)
NEUTROPHILS NFR BLD AUTO: 76.9 % (ref 37–73)
NRBC BLD AUTO-RTO: 0 %
PLATELET # BLD AUTO: 220 X10(3)/MCL (ref 140–371)
PMV BLD AUTO: 9.9 FL (ref 9.4–12.4)
POTASSIUM SERPL-SCNC: 4 MMOL/L (ref 3.5–5.1)
PROT SERPL-MCNC: 6.8 GM/DL (ref 6.3–8.2)
RBC # BLD AUTO: 2.53 X10(6)/MCL (ref 4–6)
SODIUM SERPL-SCNC: 140 MMOL/L (ref 135–145)
WBC # SPEC AUTO: 7.27 X10(3)/MCL (ref 4–11.5)

## 2023-06-16 PROCEDURE — 80053 COMPREHEN METABOLIC PANEL: CPT

## 2023-06-16 PROCEDURE — 36415 COLL VENOUS BLD VENIPUNCTURE: CPT

## 2023-06-16 PROCEDURE — 83550 IRON BINDING TEST: CPT

## 2023-06-16 PROCEDURE — 82728 ASSAY OF FERRITIN: CPT

## 2023-06-17 LAB
IRON SATN MFR SERPL: 15 % (ref 20–50)
IRON SERPL-MCNC: 43 UG/DL (ref 65–175)
TIBC SERPL-MCNC: 241 UG/DL (ref 69–240)
TIBC SERPL-MCNC: 284 UG/DL (ref 250–450)
TRANSFERRIN SERPL-MCNC: 244 MG/DL (ref 163–344)

## 2023-06-18 RX ORDER — HEPARIN 100 UNIT/ML
500 SYRINGE INTRAVENOUS
Status: CANCELLED | OUTPATIENT
Start: 2023-06-22

## 2023-06-18 RX ORDER — HEPARIN 100 UNIT/ML
500 SYRINGE INTRAVENOUS
Status: CANCELLED | OUTPATIENT
Start: 2023-06-18

## 2023-06-18 RX ORDER — SODIUM CHLORIDE 0.9 % (FLUSH) 0.9 %
10 SYRINGE (ML) INJECTION
Status: CANCELLED | OUTPATIENT
Start: 2023-06-22

## 2023-06-18 RX ORDER — SODIUM CHLORIDE 0.9 % (FLUSH) 0.9 %
10 SYRINGE (ML) INJECTION
Status: CANCELLED | OUTPATIENT
Start: 2023-06-18

## 2023-06-25 RX ORDER — EPINEPHRINE 0.3 MG/.3ML
0.3 INJECTION SUBCUTANEOUS ONCE AS NEEDED
Status: CANCELLED | OUTPATIENT
Start: 2023-06-26

## 2023-06-25 RX ORDER — HEPARIN 100 UNIT/ML
500 SYRINGE INTRAVENOUS
Status: CANCELLED | OUTPATIENT
Start: 2023-06-26

## 2023-06-25 RX ORDER — DIPHENHYDRAMINE HYDROCHLORIDE 50 MG/ML
50 INJECTION INTRAMUSCULAR; INTRAVENOUS ONCE AS NEEDED
Status: CANCELLED | OUTPATIENT
Start: 2023-06-26

## 2023-06-25 RX ORDER — SODIUM CHLORIDE 0.9 % (FLUSH) 0.9 %
10 SYRINGE (ML) INJECTION
Status: CANCELLED | OUTPATIENT
Start: 2023-06-26

## 2023-06-26 ENCOUNTER — LAB VISIT (OUTPATIENT)
Dept: LAB | Facility: HOSPITAL | Age: 63
End: 2023-06-26
Attending: STUDENT IN AN ORGANIZED HEALTH CARE EDUCATION/TRAINING PROGRAM
Payer: MEDICARE

## 2023-06-26 ENCOUNTER — INFUSION (OUTPATIENT)
Dept: INFUSION THERAPY | Facility: HOSPITAL | Age: 63
End: 2023-06-26
Attending: FAMILY MEDICINE
Payer: MEDICARE

## 2023-06-26 VITALS
HEART RATE: 71 BPM | HEIGHT: 75 IN | SYSTOLIC BLOOD PRESSURE: 110 MMHG | OXYGEN SATURATION: 100 % | BODY MASS INDEX: 36.51 KG/M2 | TEMPERATURE: 96 F | WEIGHT: 293.63 LBS | DIASTOLIC BLOOD PRESSURE: 52 MMHG

## 2023-06-26 DIAGNOSIS — D50.0 IRON DEFICIENCY ANEMIA DUE TO CHRONIC BLOOD LOSS: ICD-10-CM

## 2023-06-26 DIAGNOSIS — D50.0 IRON DEFICIENCY ANEMIA DUE TO CHRONIC BLOOD LOSS: Primary | ICD-10-CM

## 2023-06-26 LAB
BASOPHILS # BLD AUTO: 0.03 X10(3)/MCL (ref 0.01–0.08)
BASOPHILS NFR BLD AUTO: 0.4 % (ref 0.1–1.2)
EOSINOPHIL # BLD AUTO: 0.18 X10(3)/MCL (ref 0.04–0.54)
EOSINOPHIL NFR BLD AUTO: 2.1 % (ref 0.7–7)
ERYTHROCYTE [DISTWIDTH] IN BLOOD BY AUTOMATED COUNT: 15.7 %
HCT VFR BLD AUTO: 20.2 % (ref 36–52)
HGB BLD-MCNC: 6.5 G/DL (ref 13–18)
IMM GRANULOCYTES # BLD AUTO: 0.02 X10(3)/MCL (ref 0–0.03)
IMM GRANULOCYTES NFR BLD AUTO: 0.2 % (ref 0–0.5)
LYMPHOCYTES # BLD AUTO: 1.61 X10(3)/MCL (ref 1.32–3.57)
LYMPHOCYTES NFR BLD AUTO: 19.1 % (ref 20–55)
MCH RBC QN AUTO: 31 PG (ref 27–34)
MCHC RBC AUTO-ENTMCNC: 32.2 G/DL (ref 31–37)
MCV RBC AUTO: 96.2 FL (ref 79–99)
MONOCYTES # BLD AUTO: 0.52 X10(3)/MCL (ref 0.3–0.82)
MONOCYTES NFR BLD AUTO: 6.2 % (ref 4.7–12.5)
NEUTROPHILS # BLD AUTO: 6.06 X10(3)/MCL (ref 1.78–5.38)
NEUTROPHILS NFR BLD AUTO: 72 % (ref 37–73)
NRBC BLD AUTO-RTO: 0 %
PLATELET # BLD AUTO: 240 X10(3)/MCL (ref 140–371)
PMV BLD AUTO: 9.3 FL (ref 9.4–12.4)
RBC # BLD AUTO: 2.1 X10(6)/MCL (ref 4–6)
WBC # SPEC AUTO: 8.42 X10(3)/MCL (ref 4–11.5)

## 2023-06-26 PROCEDURE — 85025 COMPLETE CBC W/AUTO DIFF WBC: CPT

## 2023-06-26 PROCEDURE — 96365 THER/PROPH/DIAG IV INF INIT: CPT

## 2023-06-26 PROCEDURE — 63600175 PHARM REV CODE 636 W HCPCS: Performed by: STUDENT IN AN ORGANIZED HEALTH CARE EDUCATION/TRAINING PROGRAM

## 2023-06-26 PROCEDURE — 25000003 PHARM REV CODE 250: Performed by: STUDENT IN AN ORGANIZED HEALTH CARE EDUCATION/TRAINING PROGRAM

## 2023-06-26 PROCEDURE — 36415 COLL VENOUS BLD VENIPUNCTURE: CPT

## 2023-06-26 RX ORDER — SODIUM CHLORIDE 0.9 % (FLUSH) 0.9 %
10 SYRINGE (ML) INJECTION
Status: CANCELLED | OUTPATIENT
Start: 2023-07-03

## 2023-06-26 RX ORDER — DIPHENHYDRAMINE HYDROCHLORIDE 50 MG/ML
50 INJECTION INTRAMUSCULAR; INTRAVENOUS ONCE AS NEEDED
Status: CANCELLED | OUTPATIENT
Start: 2023-07-03

## 2023-06-26 RX ORDER — HEPARIN 100 UNIT/ML
500 SYRINGE INTRAVENOUS
Status: CANCELLED | OUTPATIENT
Start: 2023-07-03

## 2023-06-26 RX ORDER — EPINEPHRINE 0.3 MG/.3ML
0.3 INJECTION SUBCUTANEOUS ONCE AS NEEDED
Status: CANCELLED | OUTPATIENT
Start: 2023-07-03

## 2023-06-26 RX ADMIN — SODIUM CHLORIDE: 9 INJECTION, SOLUTION INTRAVENOUS at 03:06

## 2023-06-26 RX ADMIN — IRON SUCROSE 250 MG: 20 INJECTION, SOLUTION INTRAVENOUS at 02:06

## 2023-06-27 ENCOUNTER — LAB VISIT (OUTPATIENT)
Dept: LAB | Facility: HOSPITAL | Age: 63
End: 2023-06-27
Attending: STUDENT IN AN ORGANIZED HEALTH CARE EDUCATION/TRAINING PROGRAM
Payer: MEDICARE

## 2023-06-27 DIAGNOSIS — D64.9 SYMPTOMATIC ANEMIA: Primary | ICD-10-CM

## 2023-06-27 DIAGNOSIS — D64.9 SYMPTOMATIC ANEMIA: ICD-10-CM

## 2023-06-27 LAB
ABO AND RH: NORMAL
ANTIBODY SCREEN: NORMAL
SPECIMEN OUTDATE: NORMAL

## 2023-06-27 PROCEDURE — 86920 COMPATIBILITY TEST SPIN: CPT | Performed by: STUDENT IN AN ORGANIZED HEALTH CARE EDUCATION/TRAINING PROGRAM

## 2023-06-27 PROCEDURE — 86900 BLOOD TYPING SEROLOGIC ABO: CPT | Performed by: STUDENT IN AN ORGANIZED HEALTH CARE EDUCATION/TRAINING PROGRAM

## 2023-06-27 PROCEDURE — 36415 COLL VENOUS BLD VENIPUNCTURE: CPT

## 2023-06-27 RX ORDER — HYDROCODONE BITARTRATE AND ACETAMINOPHEN 500; 5 MG/1; MG/1
TABLET ORAL ONCE
Status: CANCELLED | OUTPATIENT
Start: 2023-06-27 | End: 2023-06-27

## 2023-06-28 ENCOUNTER — INFUSION (OUTPATIENT)
Dept: INFUSION THERAPY | Facility: HOSPITAL | Age: 63
End: 2023-06-28
Attending: FAMILY MEDICINE
Payer: MEDICARE

## 2023-06-28 VITALS
OXYGEN SATURATION: 100 % | RESPIRATION RATE: 18 BRPM | SYSTOLIC BLOOD PRESSURE: 118 MMHG | TEMPERATURE: 99 F | HEART RATE: 65 BPM | DIASTOLIC BLOOD PRESSURE: 48 MMHG

## 2023-06-28 DIAGNOSIS — D64.9 SYMPTOMATIC ANEMIA: ICD-10-CM

## 2023-06-28 LAB
ABO + RH BLD: NORMAL
BLD PROD TYP BPU: NORMAL
BLOOD UNIT EXPIRATION DATE: NORMAL
BLOOD UNIT TYPE CODE: 5100
CROSSMATCH INTERPRETATION: NORMAL
DISPENSE STATUS: NORMAL
UNIT NUMBER: NORMAL

## 2023-06-28 PROCEDURE — P9016 RBC LEUKOCYTES REDUCED: HCPCS | Performed by: STUDENT IN AN ORGANIZED HEALTH CARE EDUCATION/TRAINING PROGRAM

## 2023-06-28 PROCEDURE — 36430 TRANSFUSION BLD/BLD COMPNT: CPT

## 2023-06-28 PROCEDURE — 25000003 PHARM REV CODE 250: Performed by: STUDENT IN AN ORGANIZED HEALTH CARE EDUCATION/TRAINING PROGRAM

## 2023-06-28 RX ORDER — HYDROCODONE BITARTRATE AND ACETAMINOPHEN 500; 5 MG/1; MG/1
TABLET ORAL ONCE
Status: COMPLETED | OUTPATIENT
Start: 2023-06-28 | End: 2023-06-28

## 2023-06-28 RX ADMIN — SODIUM CHLORIDE: 0.9 INJECTION, SOLUTION INTRAVENOUS at 07:06

## 2023-06-28 NOTE — PROGRESS NOTES
Patient here for blood transfusion x1 unit of PRBCs. IV started in Rt hand upon request with 20g angiocath, flushed with NS. Lab notified of patient.

## 2023-06-28 NOTE — PROGRESS NOTES
BLOOD TRANSFUSION STARTED, HEART RATE REGULAR, BREATH SOUNDS CLEAR BILATERALLY, DENIES PAIN, NO NEEDS VOICED AT THIS TIME, SIGNS AND SYMPTOMS OF BLOOD TRANSFUSION REACTION EXPLAINED TO PATIENT, WILL MONITOR PATIENT FOR THE FIRST 15 MINUTES, CB IN REACH

## 2023-07-03 ENCOUNTER — INFUSION (OUTPATIENT)
Dept: INFUSION THERAPY | Facility: HOSPITAL | Age: 63
End: 2023-07-03
Attending: FAMILY MEDICINE
Payer: MEDICARE

## 2023-07-03 VITALS
WEIGHT: 292.81 LBS | HEART RATE: 63 BPM | BODY MASS INDEX: 36.41 KG/M2 | HEIGHT: 75 IN | SYSTOLIC BLOOD PRESSURE: 96 MMHG | DIASTOLIC BLOOD PRESSURE: 51 MMHG | OXYGEN SATURATION: 98 % | TEMPERATURE: 98 F

## 2023-07-03 DIAGNOSIS — D50.0 IRON DEFICIENCY ANEMIA DUE TO CHRONIC BLOOD LOSS: Primary | ICD-10-CM

## 2023-07-03 PROCEDURE — 96365 THER/PROPH/DIAG IV INF INIT: CPT

## 2023-07-03 PROCEDURE — 25000003 PHARM REV CODE 250: Performed by: STUDENT IN AN ORGANIZED HEALTH CARE EDUCATION/TRAINING PROGRAM

## 2023-07-03 PROCEDURE — 63600175 PHARM REV CODE 636 W HCPCS: Performed by: STUDENT IN AN ORGANIZED HEALTH CARE EDUCATION/TRAINING PROGRAM

## 2023-07-03 RX ORDER — SODIUM CHLORIDE 0.9 % (FLUSH) 0.9 %
10 SYRINGE (ML) INJECTION
Status: CANCELLED | OUTPATIENT
Start: 2023-07-10

## 2023-07-03 RX ORDER — HEPARIN 100 UNIT/ML
500 SYRINGE INTRAVENOUS
Status: CANCELLED | OUTPATIENT
Start: 2023-07-10

## 2023-07-03 RX ORDER — DIPHENHYDRAMINE HYDROCHLORIDE 50 MG/ML
50 INJECTION INTRAMUSCULAR; INTRAVENOUS ONCE AS NEEDED
Status: CANCELLED | OUTPATIENT
Start: 2023-07-10

## 2023-07-03 RX ORDER — EPINEPHRINE 0.3 MG/.3ML
0.3 INJECTION SUBCUTANEOUS ONCE AS NEEDED
Status: CANCELLED | OUTPATIENT
Start: 2023-07-10

## 2023-07-03 RX ADMIN — SODIUM CHLORIDE: 9 INJECTION, SOLUTION INTRAVENOUS at 03:07

## 2023-07-03 RX ADMIN — IRON SUCROSE 250 MG: 20 INJECTION, SOLUTION INTRAVENOUS at 02:07

## 2023-07-10 ENCOUNTER — INFUSION (OUTPATIENT)
Dept: INFUSION THERAPY | Facility: HOSPITAL | Age: 63
End: 2023-07-10
Attending: FAMILY MEDICINE
Payer: MEDICARE

## 2023-07-10 VITALS
TEMPERATURE: 97 F | SYSTOLIC BLOOD PRESSURE: 126 MMHG | OXYGEN SATURATION: 100 % | DIASTOLIC BLOOD PRESSURE: 59 MMHG | BODY MASS INDEX: 36.33 KG/M2 | WEIGHT: 292.19 LBS | HEART RATE: 58 BPM | HEIGHT: 75 IN

## 2023-07-10 DIAGNOSIS — D50.0 IRON DEFICIENCY ANEMIA DUE TO CHRONIC BLOOD LOSS: Primary | ICD-10-CM

## 2023-07-10 PROCEDURE — 63600175 PHARM REV CODE 636 W HCPCS: Performed by: STUDENT IN AN ORGANIZED HEALTH CARE EDUCATION/TRAINING PROGRAM

## 2023-07-10 PROCEDURE — 25000003 PHARM REV CODE 250: Performed by: STUDENT IN AN ORGANIZED HEALTH CARE EDUCATION/TRAINING PROGRAM

## 2023-07-10 PROCEDURE — 96365 THER/PROPH/DIAG IV INF INIT: CPT

## 2023-07-10 RX ORDER — HEPARIN 100 UNIT/ML
500 SYRINGE INTRAVENOUS
Status: CANCELLED | OUTPATIENT
Start: 2023-07-17

## 2023-07-10 RX ORDER — DIPHENHYDRAMINE HYDROCHLORIDE 50 MG/ML
50 INJECTION INTRAMUSCULAR; INTRAVENOUS ONCE AS NEEDED
Status: CANCELLED | OUTPATIENT
Start: 2023-07-17

## 2023-07-10 RX ORDER — EPINEPHRINE 0.3 MG/.3ML
0.3 INJECTION SUBCUTANEOUS ONCE AS NEEDED
Status: CANCELLED | OUTPATIENT
Start: 2023-07-17

## 2023-07-10 RX ORDER — SODIUM BICARBONATE 650 MG/1
TABLET ORAL
COMMUNITY
Start: 2023-07-05 | End: 2023-08-05 | Stop reason: ALTCHOICE

## 2023-07-10 RX ORDER — SODIUM CHLORIDE 0.9 % (FLUSH) 0.9 %
10 SYRINGE (ML) INJECTION
Status: CANCELLED | OUTPATIENT
Start: 2023-07-17

## 2023-07-10 RX ADMIN — IRON SUCROSE 250 MG: 20 INJECTION, SOLUTION INTRAVENOUS at 02:07

## 2023-07-10 RX ADMIN — SODIUM CHLORIDE: 9 INJECTION, SOLUTION INTRAVENOUS at 03:07

## 2023-07-17 ENCOUNTER — INFUSION (OUTPATIENT)
Dept: INFUSION THERAPY | Facility: HOSPITAL | Age: 63
End: 2023-07-17
Attending: FAMILY MEDICINE
Payer: MEDICARE

## 2023-07-17 VITALS
DIASTOLIC BLOOD PRESSURE: 54 MMHG | HEART RATE: 61 BPM | SYSTOLIC BLOOD PRESSURE: 121 MMHG | OXYGEN SATURATION: 99 % | HEIGHT: 75 IN | BODY MASS INDEX: 36.68 KG/M2 | WEIGHT: 295 LBS | TEMPERATURE: 98 F

## 2023-07-17 DIAGNOSIS — D50.0 IRON DEFICIENCY ANEMIA DUE TO CHRONIC BLOOD LOSS: Primary | ICD-10-CM

## 2023-07-17 PROCEDURE — 96365 THER/PROPH/DIAG IV INF INIT: CPT

## 2023-07-17 PROCEDURE — 63600175 PHARM REV CODE 636 W HCPCS: Performed by: STUDENT IN AN ORGANIZED HEALTH CARE EDUCATION/TRAINING PROGRAM

## 2023-07-17 PROCEDURE — 25000003 PHARM REV CODE 250: Performed by: STUDENT IN AN ORGANIZED HEALTH CARE EDUCATION/TRAINING PROGRAM

## 2023-07-17 RX ORDER — HEPARIN 100 UNIT/ML
500 SYRINGE INTRAVENOUS
Status: CANCELLED | OUTPATIENT
Start: 2023-07-24

## 2023-07-17 RX ORDER — DIPHENHYDRAMINE HYDROCHLORIDE 50 MG/ML
50 INJECTION INTRAMUSCULAR; INTRAVENOUS ONCE AS NEEDED
Status: CANCELLED | OUTPATIENT
Start: 2023-07-24

## 2023-07-17 RX ORDER — SODIUM CHLORIDE 0.9 % (FLUSH) 0.9 %
10 SYRINGE (ML) INJECTION
Status: CANCELLED | OUTPATIENT
Start: 2023-07-24

## 2023-07-17 RX ORDER — EPINEPHRINE 0.3 MG/.3ML
0.3 INJECTION SUBCUTANEOUS ONCE AS NEEDED
Status: CANCELLED | OUTPATIENT
Start: 2023-07-24

## 2023-07-17 RX ADMIN — IRON SUCROSE 250 MG: 20 INJECTION, SOLUTION INTRAVENOUS at 02:07

## 2023-07-17 RX ADMIN — SODIUM CHLORIDE: 9 INJECTION, SOLUTION INTRAVENOUS at 03:07

## 2023-08-01 DIAGNOSIS — D50.0 IRON DEFICIENCY ANEMIA DUE TO CHRONIC BLOOD LOSS: ICD-10-CM

## 2023-08-01 DIAGNOSIS — D64.9 SYMPTOMATIC ANEMIA: Primary | ICD-10-CM

## 2023-08-03 ENCOUNTER — LAB VISIT (OUTPATIENT)
Dept: LAB | Facility: HOSPITAL | Age: 63
End: 2023-08-03
Attending: NURSE PRACTITIONER
Payer: MEDICARE

## 2023-08-03 ENCOUNTER — DOCUMENTATION ONLY (OUTPATIENT)
Dept: HEMATOLOGY/ONCOLOGY | Facility: CLINIC | Age: 63
End: 2023-08-03
Payer: MEDICARE

## 2023-08-03 ENCOUNTER — OFFICE VISIT (OUTPATIENT)
Dept: HEMATOLOGY/ONCOLOGY | Facility: CLINIC | Age: 63
End: 2023-08-03
Payer: MEDICARE

## 2023-08-03 VITALS
SYSTOLIC BLOOD PRESSURE: 101 MMHG | HEART RATE: 72 BPM | DIASTOLIC BLOOD PRESSURE: 62 MMHG | OXYGEN SATURATION: 99 % | HEIGHT: 75 IN | WEIGHT: 293.44 LBS | TEMPERATURE: 98 F | BODY MASS INDEX: 36.48 KG/M2 | RESPIRATION RATE: 18 BRPM

## 2023-08-03 DIAGNOSIS — D50.0 IRON DEFICIENCY ANEMIA DUE TO CHRONIC BLOOD LOSS: ICD-10-CM

## 2023-08-03 DIAGNOSIS — D64.9 SYMPTOMATIC ANEMIA: Primary | ICD-10-CM

## 2023-08-03 DIAGNOSIS — D64.9 SYMPTOMATIC ANEMIA: ICD-10-CM

## 2023-08-03 DIAGNOSIS — D50.0 IRON DEFICIENCY ANEMIA DUE TO CHRONIC BLOOD LOSS: Primary | ICD-10-CM

## 2023-08-03 LAB
ALBUMIN SERPL-MCNC: 3 G/DL (ref 3.4–4.8)
ALBUMIN/GLOB SERPL: 0.8 RATIO (ref 1.1–2)
ALP SERPL-CCNC: 60 UNIT/L (ref 40–150)
ALT SERPL-CCNC: 7 UNIT/L (ref 0–55)
AST SERPL-CCNC: 10 UNIT/L (ref 5–34)
BASOPHILS # BLD AUTO: 0.03 X10(3)/MCL
BASOPHILS NFR BLD AUTO: 0.4 %
BILIRUBIN DIRECT+TOT PNL SERPL-MCNC: 0.2 MG/DL
BUN SERPL-MCNC: 48 MG/DL (ref 8.4–25.7)
CALCIUM SERPL-MCNC: 8.6 MG/DL (ref 8.8–10)
CHLORIDE SERPL-SCNC: 106 MMOL/L (ref 98–107)
CO2 SERPL-SCNC: 22 MMOL/L (ref 23–31)
CREAT SERPL-MCNC: 4.28 MG/DL (ref 0.73–1.18)
EOSINOPHIL # BLD AUTO: 0.14 X10(3)/MCL (ref 0–0.9)
EOSINOPHIL NFR BLD AUTO: 1.9 %
ERYTHROCYTE [DISTWIDTH] IN BLOOD BY AUTOMATED COUNT: 14.8 % (ref 11.5–17)
FERRITIN SERPL-MCNC: 52.6 NG/ML (ref 21.81–274.66)
GFR SERPLBLD CREATININE-BSD FMLA CKD-EPI: 15 MLS/MIN/1.73/M2
GLOBULIN SER-MCNC: 3.9 GM/DL (ref 2.4–3.5)
GLUCOSE SERPL-MCNC: 120 MG/DL (ref 82–115)
GROUP & RH: NORMAL
HCT VFR BLD AUTO: 21.3 % (ref 42–52)
HGB BLD-MCNC: 6.4 G/DL (ref 14–18)
IMM GRANULOCYTES # BLD AUTO: 0.02 X10(3)/MCL (ref 0–0.04)
IMM GRANULOCYTES NFR BLD AUTO: 0.3 %
INDIRECT COOMBS GEL: NORMAL
IRON SATN MFR SERPL: 13 % (ref 20–50)
IRON SERPL-MCNC: 31 UG/DL (ref 65–175)
LYMPHOCYTES # BLD AUTO: 1.07 X10(3)/MCL (ref 0.6–4.6)
LYMPHOCYTES NFR BLD AUTO: 14.2 %
MCH RBC QN AUTO: 30.6 PG (ref 27–31)
MCHC RBC AUTO-ENTMCNC: 30 G/DL (ref 33–36)
MCV RBC AUTO: 101.9 FL (ref 80–94)
MONOCYTES # BLD AUTO: 0.49 X10(3)/MCL (ref 0.1–1.3)
MONOCYTES NFR BLD AUTO: 6.5 %
NEUTROPHILS # BLD AUTO: 5.77 X10(3)/MCL (ref 2.1–9.2)
NEUTROPHILS NFR BLD AUTO: 76.7 %
PLATELET # BLD AUTO: 205 X10(3)/MCL (ref 130–400)
PMV BLD AUTO: 9.7 FL (ref 7.4–10.4)
POTASSIUM SERPL-SCNC: 3.3 MMOL/L (ref 3.5–5.1)
PROT SERPL-MCNC: 6.9 GM/DL (ref 5.8–7.6)
RBC # BLD AUTO: 2.09 X10(6)/MCL (ref 4.7–6.1)
SODIUM SERPL-SCNC: 140 MMOL/L (ref 136–145)
SPECIMEN OUTDATE: NORMAL
TIBC SERPL-MCNC: 216 UG/DL (ref 69–240)
TIBC SERPL-MCNC: 247 UG/DL (ref 250–450)
WBC # SPEC AUTO: 7.52 X10(3)/MCL (ref 4.5–11.5)

## 2023-08-03 PROCEDURE — 99999 PR PBB SHADOW E&M-EST. PATIENT-LVL IV: ICD-10-PCS | Mod: PBBFAC,,, | Performed by: NURSE PRACTITIONER

## 2023-08-03 PROCEDURE — 36415 COLL VENOUS BLD VENIPUNCTURE: CPT

## 2023-08-03 PROCEDURE — 99999 PR PBB SHADOW E&M-EST. PATIENT-LVL IV: CPT | Mod: PBBFAC,,, | Performed by: NURSE PRACTITIONER

## 2023-08-03 PROCEDURE — 99214 OFFICE O/P EST MOD 30 MIN: CPT | Mod: S$GLB,,, | Performed by: NURSE PRACTITIONER

## 2023-08-03 PROCEDURE — 1159F PR MEDICATION LIST DOCUMENTED IN MEDICAL RECORD: ICD-10-PCS | Mod: CPTII,S$GLB,, | Performed by: NURSE PRACTITIONER

## 2023-08-03 PROCEDURE — 82728 ASSAY OF FERRITIN: CPT

## 2023-08-03 PROCEDURE — 1159F MED LIST DOCD IN RCRD: CPT | Mod: CPTII,S$GLB,, | Performed by: NURSE PRACTITIONER

## 2023-08-03 PROCEDURE — 80053 COMPREHEN METABOLIC PANEL: CPT

## 2023-08-03 PROCEDURE — 86900 BLOOD TYPING SEROLOGIC ABO: CPT | Performed by: STUDENT IN AN ORGANIZED HEALTH CARE EDUCATION/TRAINING PROGRAM

## 2023-08-03 PROCEDURE — 3078F DIAST BP <80 MM HG: CPT | Mod: CPTII,S$GLB,, | Performed by: NURSE PRACTITIONER

## 2023-08-03 PROCEDURE — 3008F BODY MASS INDEX DOCD: CPT | Mod: CPTII,S$GLB,, | Performed by: NURSE PRACTITIONER

## 2023-08-03 PROCEDURE — 3074F PR MOST RECENT SYSTOLIC BLOOD PRESSURE < 130 MM HG: ICD-10-PCS | Mod: CPTII,S$GLB,, | Performed by: NURSE PRACTITIONER

## 2023-08-03 PROCEDURE — 3008F PR BODY MASS INDEX (BMI) DOCUMENTED: ICD-10-PCS | Mod: CPTII,S$GLB,, | Performed by: NURSE PRACTITIONER

## 2023-08-03 PROCEDURE — 1160F PR REVIEW ALL MEDS BY PRESCRIBER/CLIN PHARMACIST DOCUMENTED: ICD-10-PCS | Mod: CPTII,S$GLB,, | Performed by: NURSE PRACTITIONER

## 2023-08-03 PROCEDURE — 3074F SYST BP LT 130 MM HG: CPT | Mod: CPTII,S$GLB,, | Performed by: NURSE PRACTITIONER

## 2023-08-03 PROCEDURE — 1160F RVW MEDS BY RX/DR IN RCRD: CPT | Mod: CPTII,S$GLB,, | Performed by: NURSE PRACTITIONER

## 2023-08-03 PROCEDURE — 99214 PR OFFICE/OUTPT VISIT, EST, LEVL IV, 30-39 MIN: ICD-10-PCS | Mod: S$GLB,,, | Performed by: NURSE PRACTITIONER

## 2023-08-03 PROCEDURE — 3078F PR MOST RECENT DIASTOLIC BLOOD PRESSURE < 80 MM HG: ICD-10-PCS | Mod: CPTII,S$GLB,, | Performed by: NURSE PRACTITIONER

## 2023-08-03 PROCEDURE — 85025 COMPLETE CBC W/AUTO DIFF WBC: CPT

## 2023-08-03 PROCEDURE — 83550 IRON BINDING TEST: CPT

## 2023-08-03 RX ORDER — HYDROCODONE BITARTRATE AND ACETAMINOPHEN 500; 5 MG/1; MG/1
TABLET ORAL ONCE
Status: CANCELLED | OUTPATIENT
Start: 2023-08-03 | End: 2023-08-03

## 2023-08-03 NOTE — PROGRESS NOTES
Called and spoke Dr. Olson's office to make them aware that the patient's creatinine is up to 4.28. I received their fax number and faxed over the results as well.

## 2023-08-04 ENCOUNTER — HOSPITAL ENCOUNTER (EMERGENCY)
Facility: HOSPITAL | Age: 63
Discharge: HOME | End: 2023-08-05
Payer: MEDICARE

## 2023-08-04 DIAGNOSIS — R07.9 CHEST PAIN: ICD-10-CM

## 2023-08-04 DIAGNOSIS — R07.89 CHEST HEAVINESS: ICD-10-CM

## 2023-08-04 DIAGNOSIS — D64.9 SYMPTOMATIC ANEMIA: Primary | ICD-10-CM

## 2023-08-04 LAB
ABO AND RH: NORMAL
ALBUMIN SERPL-MCNC: 3.6 G/DL (ref 3.4–5)
ALBUMIN/GLOB SERPL: 1.1 RATIO
ALP SERPL-CCNC: 73 UNIT/L (ref 50–144)
ALT SERPL-CCNC: 17 UNIT/L (ref 1–45)
ANION GAP SERPL CALC-SCNC: 13 MEQ/L (ref 2–13)
ANTIBODY SCREEN: NORMAL
AST SERPL-CCNC: 22 UNIT/L (ref 17–59)
BASOPHILS # BLD AUTO: 0.03 X10(3)/MCL (ref 0.01–0.08)
BASOPHILS NFR BLD AUTO: 0.4 % (ref 0.1–1.2)
BILIRUBIN DIRECT+TOT PNL SERPL-MCNC: 0.2 MG/DL (ref 0–1)
BNP BLD-MCNC: 273 PG/ML (ref 10–450)
BUN SERPL-MCNC: 45 MG/DL (ref 7–20)
CALCIUM SERPL-MCNC: 8.1 MG/DL (ref 8.4–10.2)
CHLORIDE SERPL-SCNC: 105 MMOL/L (ref 98–110)
CO2 SERPL-SCNC: 19 MMOL/L (ref 21–32)
CREAT SERPL-MCNC: 4.26 MG/DL (ref 0.66–1.25)
CREAT/UREA NIT SERPL: 11 (ref 12–20)
EOSINOPHIL # BLD AUTO: 0.15 X10(3)/MCL (ref 0.04–0.54)
EOSINOPHIL NFR BLD AUTO: 2.1 % (ref 0.7–7)
ERYTHROCYTE [DISTWIDTH] IN BLOOD BY AUTOMATED COUNT: 14.7 %
GFR SERPLBLD CREATININE-BSD FMLA CKD-EPI: 15 MLS/MIN/1.73/M2
GLOBULIN SER-MCNC: 3.4 GM/DL (ref 2–3.9)
GLUCOSE SERPL-MCNC: 166 MG/DL (ref 70–115)
HCT VFR BLD AUTO: 20 % (ref 36–52)
HGB BLD-MCNC: 6.3 G/DL (ref 13–18)
IMM GRANULOCYTES # BLD AUTO: 0.02 X10(3)/MCL (ref 0–0.03)
IMM GRANULOCYTES NFR BLD AUTO: 0.3 % (ref 0–0.5)
LYMPHOCYTES # BLD AUTO: 1.43 X10(3)/MCL (ref 1.32–3.57)
LYMPHOCYTES NFR BLD AUTO: 19.8 % (ref 20–55)
MCH RBC QN AUTO: 30.9 PG (ref 27–34)
MCHC RBC AUTO-ENTMCNC: 31.5 G/DL (ref 31–37)
MCV RBC AUTO: 98 FL (ref 79–99)
MONOCYTES # BLD AUTO: 0.45 X10(3)/MCL (ref 0.3–0.82)
MONOCYTES NFR BLD AUTO: 6.2 % (ref 4.7–12.5)
NEUTROPHILS # BLD AUTO: 5.13 X10(3)/MCL (ref 1.78–5.38)
NEUTROPHILS NFR BLD AUTO: 71.2 % (ref 37–73)
NRBC BLD AUTO-RTO: 0 %
PLATELET # BLD AUTO: 227 X10(3)/MCL (ref 140–371)
PMV BLD AUTO: 9.7 FL (ref 9.4–12.4)
POTASSIUM SERPL-SCNC: 3.3 MMOL/L (ref 3.5–5.1)
PROT SERPL-MCNC: 7 GM/DL (ref 6.3–8.2)
RBC # BLD AUTO: 2.04 X10(6)/MCL (ref 4–6)
SODIUM SERPL-SCNC: 137 MMOL/L (ref 135–145)
SPECIMEN OUTDATE: NORMAL
TROPONIN I SERPL-MCNC: 0.03 NG/ML (ref 0–0.03)
WBC # SPEC AUTO: 7.21 X10(3)/MCL (ref 4–11.5)

## 2023-08-04 PROCEDURE — 86920 COMPATIBILITY TEST SPIN: CPT

## 2023-08-04 PROCEDURE — 84484 ASSAY OF TROPONIN QUANT: CPT

## 2023-08-04 PROCEDURE — 83880 ASSAY OF NATRIURETIC PEPTIDE: CPT

## 2023-08-04 PROCEDURE — 80053 COMPREHEN METABOLIC PANEL: CPT

## 2023-08-04 PROCEDURE — 85025 COMPLETE CBC W/AUTO DIFF WBC: CPT

## 2023-08-04 PROCEDURE — 36430 TRANSFUSION BLD/BLD COMPNT: CPT

## 2023-08-04 PROCEDURE — 86900 BLOOD TYPING SEROLOGIC ABO: CPT

## 2023-08-04 PROCEDURE — 93005 ELECTROCARDIOGRAM TRACING: CPT

## 2023-08-04 PROCEDURE — 99285 EMERGENCY DEPT VISIT HI MDM: CPT | Mod: 25

## 2023-08-04 PROCEDURE — 36415 COLL VENOUS BLD VENIPUNCTURE: CPT

## 2023-08-04 RX ORDER — SODIUM CHLORIDE 0.9 % (FLUSH) 0.9 %
10 SYRINGE (ML) INJECTION
Status: CANCELLED | OUTPATIENT
Start: 2023-08-07

## 2023-08-04 RX ORDER — HYDROCODONE BITARTRATE AND ACETAMINOPHEN 500; 5 MG/1; MG/1
TABLET ORAL
Status: DISCONTINUED | OUTPATIENT
Start: 2023-08-04 | End: 2023-08-05 | Stop reason: HOSPADM

## 2023-08-04 RX ORDER — EPINEPHRINE 0.3 MG/.3ML
0.3 INJECTION SUBCUTANEOUS ONCE AS NEEDED
Status: CANCELLED | OUTPATIENT
Start: 2023-08-07

## 2023-08-04 RX ORDER — HYDROCODONE BITARTRATE AND ACETAMINOPHEN 500; 5 MG/1; MG/1
TABLET ORAL
Status: DISCONTINUED | OUTPATIENT
Start: 2023-08-05 | End: 2023-08-05 | Stop reason: HOSPADM

## 2023-08-04 RX ORDER — HEPARIN 100 UNIT/ML
500 SYRINGE INTRAVENOUS
Status: CANCELLED | OUTPATIENT
Start: 2023-08-07

## 2023-08-04 RX ORDER — DIPHENHYDRAMINE HYDROCHLORIDE 50 MG/ML
50 INJECTION INTRAMUSCULAR; INTRAVENOUS ONCE AS NEEDED
Status: CANCELLED | OUTPATIENT
Start: 2023-08-07

## 2023-08-05 VITALS
BODY MASS INDEX: 44.86 KG/M2 | HEART RATE: 60 BPM | DIASTOLIC BLOOD PRESSURE: 70 MMHG | OXYGEN SATURATION: 93 % | WEIGHT: 296 LBS | TEMPERATURE: 98 F | HEIGHT: 68 IN | RESPIRATION RATE: 16 BRPM | SYSTOLIC BLOOD PRESSURE: 174 MMHG

## 2023-08-05 PROCEDURE — 25000003 PHARM REV CODE 250

## 2023-08-05 PROCEDURE — P9016 RBC LEUKOCYTES REDUCED: HCPCS

## 2023-08-05 RX ORDER — AMITRIPTYLINE HYDROCHLORIDE 50 MG/1
50 TABLET, FILM COATED ORAL NIGHTLY
Status: ON HOLD | COMMUNITY
End: 2024-03-01 | Stop reason: HOSPADM

## 2023-08-05 RX ORDER — ALPRAZOLAM 1 MG/1
1 TABLET ORAL 2 TIMES DAILY
Status: ON HOLD | COMMUNITY
End: 2024-03-01 | Stop reason: HOSPADM

## 2023-08-05 RX ORDER — ATENOLOL 50 MG/1
50 TABLET ORAL DAILY
Status: ON HOLD | COMMUNITY
End: 2024-03-01 | Stop reason: HOSPADM

## 2023-08-05 RX ADMIN — SODIUM CHLORIDE: 9 INJECTION, SOLUTION INTRAVENOUS at 12:08

## 2023-08-05 NOTE — ED PROVIDER NOTES
"Encounter Date: 8/4/2023       History     Chief Complaint   Patient presents with    Chest Pain     Pt reports onset of chest pain "today". Pt guarded and refuses to elaborate.      62-year-old male, well known to me, presents with chest pain since this afternoon.  He has a history of coronary artery disease, but also gets chest pain when his blood counts are low.  He has idiopathic anemia, and has received at least 5 transfusions in the past year so.  He was set up to get a transfusion this morning and [place], but he did not have a ride.  His last hemoglobin done yesterday was 6.4.  He also has chronic renal insufficiency, and his creatinine yesterday was 4.38.    The history is provided by the patient.     Review of patient's allergies indicates:   Allergen Reactions    Allergenic extract-food-shrimp Rash     All seafood      Lactose Rash    Mayonnaise Rash     Past Medical History:   Diagnosis Date    Diabetes mellitus     Heart disease     Hypertension     Peripheral artery disease      Past Surgical History:   Procedure Laterality Date    PLACEMENT-STENT      pt states "in one of my legs"     No family history on file.  Social History     Tobacco Use    Smoking status: Former     Current packs/day: 0.00     Types: Cigarettes    Smokeless tobacco: Never   Substance Use Topics    Alcohol use: Not Currently    Drug use: Never     Review of Systems   Constitutional:  Negative for fever.   HENT:  Negative for sore throat.    Respiratory:  Positive for shortness of breath.         He has some dyspnea on exertion, but not at rest   Cardiovascular:  Positive for chest pain.   Gastrointestinal:  Negative for nausea.   Genitourinary:  Negative for dysuria.   Musculoskeletal:  Negative for back pain.   Skin:  Negative for rash.   Neurological:  Negative for weakness.   Hematological:  Does not bruise/bleed easily.   All other systems reviewed and are negative.      Physical Exam     Initial Vitals [08/04/23 2250]   BP " Pulse Resp Temp SpO2   (!) 122/55 66 18 98 °F (36.7 °C) 100 %      MAP       --         Physical Exam    Nursing note and vitals reviewed.  Constitutional: Vital signs are normal. He appears well-developed and well-nourished. He is cooperative.   HENT:   Head: Normocephalic and atraumatic.   Eyes: Conjunctivae, EOM and lids are normal. Pupils are equal, round, and reactive to light.   Neck: Trachea normal. Neck supple.   Normal range of motion.  Cardiovascular:  Normal rate, regular rhythm, normal heart sounds and intact distal pulses.           Pulmonary/Chest: Breath sounds normal.   Abdominal: Abdomen is soft. Bowel sounds are normal.   Musculoskeletal:         General: Normal range of motion.      Cervical back: Normal, normal range of motion and neck supple.      Lumbar back: Normal.     Neurological: He is alert and oriented to person, place, and time. He has normal strength. Coordination normal. GCS score is 15. GCS eye subscore is 4. GCS verbal subscore is 5. GCS motor subscore is 6.   Skin: Skin is warm, dry and intact. Capillary refill takes less than 2 seconds.   Psychiatric: His speech is normal and behavior is normal. Judgment and thought content normal. Cognition and memory are normal.   He has a rather flat affect         ED Course   Procedures  Labs Reviewed   COMPREHENSIVE METABOLIC PANEL - Abnormal; Notable for the following components:       Result Value    Potassium Level 3.3 (*)     Carbon Dioxide 19 (*)     Glucose Level 166 (*)     Blood Urea Nitrogen 45.0 (*)     Creatinine 4.26 (*)     Calcium Level Total 8.1 (*)     BUN/Creatinine Ratio 11 (*)     All other components within normal limits   CBC WITH DIFFERENTIAL - Abnormal; Notable for the following components:    RBC 2.04 (*)     Hgb 6.3 (*)     Hct 20.0 (*)     Lymph % 19.8 (*)     All other components within normal limits   TROPONIN I - Normal   NT-PRO NATRIURETIC PEPTIDE - Normal   CBC W/ AUTO DIFFERENTIAL    Narrative:     The  following orders were created for panel order CBC auto differential.  Procedure                               Abnormality         Status                     ---------                               -----------         ------                     CBC with Differential[[id number]]        Abnormal            Final result                 Please view results for these tests on the individual orders.   TYPE & SCREEN   PREPARE RBC SOFT        ECG Results              EKG 12-lead (Preliminary result)  Result time 08/04/23 23:03:32      Wet Read by Kenny Blair MD (08/04/23 23:03:32, [facility], Emergency Medicine)    Normal sinus rhythm, heart rate 67, normal axis, normal intervals, normal P waves, normal QRS, normal ST segments, normal T-waves.  This is a normal EKG.                                  Imaging Results              X-Ray Chest AP Portable (Preliminary result)  Result time 08/04/23 23:34:48      Wet Read by Kenny Blair MD (08/04/23 23:34:48, [facility], Emergency Medicine)    Normal cardiac silhouette, clear lung fields                                     Medications   0.9%  NaCl infusion (for blood administration) ( Intravenous New Bag 8/5/23 0034)   0.9%  NaCl infusion (for blood administration) (has no administration in time range)     Medical Decision Making:   Initial Assessment:   Chest pain, anemia requiring transfusion, chronic kidney disease, history of coronary artery disease, normal EKG  ED Management:  Transfuse  Cardiac workup                          Clinical Impression:   Final diagnoses:  [R07.9] Chest pain  [R07.89] Chest heaviness  [D64.9] Symptomatic anemia (Primary)        ED Disposition Condition    Discharge Good          ED Prescriptions    None       Follow-up Information       Follow up With Specialties Details Why Contact Info    Vasquez Rodriguez MD Family Medicine Call in 2 days  [address]  [id number]  [phone]               Kenny Blair MD  08/05/23 0218

## 2023-08-10 ENCOUNTER — INFUSION (OUTPATIENT)
Dept: INFUSION THERAPY | Facility: HOSPITAL | Age: 63
End: 2023-08-10
Attending: FAMILY MEDICINE
Payer: MEDICARE

## 2023-08-10 VITALS
WEIGHT: 294.63 LBS | OXYGEN SATURATION: 96 % | HEIGHT: 68 IN | BODY MASS INDEX: 44.65 KG/M2 | SYSTOLIC BLOOD PRESSURE: 112 MMHG | HEART RATE: 60 BPM | TEMPERATURE: 98 F | DIASTOLIC BLOOD PRESSURE: 55 MMHG

## 2023-08-10 DIAGNOSIS — D50.0 IRON DEFICIENCY ANEMIA DUE TO CHRONIC BLOOD LOSS: Primary | ICD-10-CM

## 2023-08-10 PROCEDURE — 63600175 PHARM REV CODE 636 W HCPCS: Performed by: NURSE PRACTITIONER

## 2023-08-10 PROCEDURE — 96374 THER/PROPH/DIAG INJ IV PUSH: CPT

## 2023-08-10 RX ORDER — HEPARIN 100 UNIT/ML
500 SYRINGE INTRAVENOUS
Status: CANCELLED | OUTPATIENT
Start: 2023-08-17

## 2023-08-10 RX ORDER — DIPHENHYDRAMINE HYDROCHLORIDE 50 MG/ML
50 INJECTION INTRAMUSCULAR; INTRAVENOUS ONCE AS NEEDED
Status: CANCELLED | OUTPATIENT
Start: 2023-08-17

## 2023-08-10 RX ORDER — EPINEPHRINE 0.3 MG/.3ML
0.3 INJECTION SUBCUTANEOUS ONCE AS NEEDED
Status: CANCELLED | OUTPATIENT
Start: 2023-08-17

## 2023-08-10 RX ORDER — SODIUM CHLORIDE 0.9 % (FLUSH) 0.9 %
10 SYRINGE (ML) INJECTION
Status: CANCELLED | OUTPATIENT
Start: 2023-08-17

## 2023-08-10 RX ADMIN — IRON SUCROSE 200 MG: 20 INJECTION, SOLUTION INTRAVENOUS at 02:08

## 2023-08-17 ENCOUNTER — OFFICE VISIT (OUTPATIENT)
Dept: HEMATOLOGY/ONCOLOGY | Facility: CLINIC | Age: 63
End: 2023-08-17
Payer: MEDICARE

## 2023-08-17 ENCOUNTER — INFUSION (OUTPATIENT)
Dept: INFUSION THERAPY | Facility: HOSPITAL | Age: 63
End: 2023-08-17
Attending: FAMILY MEDICINE
Payer: MEDICARE

## 2023-08-17 VITALS
TEMPERATURE: 98 F | OXYGEN SATURATION: 100 % | DIASTOLIC BLOOD PRESSURE: 55 MMHG | WEIGHT: 294.38 LBS | HEIGHT: 75 IN | RESPIRATION RATE: 20 BRPM | SYSTOLIC BLOOD PRESSURE: 111 MMHG | HEART RATE: 58 BPM | HEART RATE: 58 BPM | RESPIRATION RATE: 20 BRPM | SYSTOLIC BLOOD PRESSURE: 111 MMHG | BODY MASS INDEX: 36.6 KG/M2 | BODY MASS INDEX: 36.6 KG/M2 | HEIGHT: 75 IN | OXYGEN SATURATION: 100 % | TEMPERATURE: 98 F | DIASTOLIC BLOOD PRESSURE: 55 MMHG | WEIGHT: 294.38 LBS

## 2023-08-17 DIAGNOSIS — D50.0 IRON DEFICIENCY ANEMIA DUE TO CHRONIC BLOOD LOSS: Primary | ICD-10-CM

## 2023-08-17 DIAGNOSIS — N18.4 STAGE 4 CHRONIC KIDNEY DISEASE: ICD-10-CM

## 2023-08-17 PROCEDURE — 99214 PR OFFICE/OUTPT VISIT, EST, LEVL IV, 30-39 MIN: ICD-10-PCS | Mod: S$GLB,,, | Performed by: STUDENT IN AN ORGANIZED HEALTH CARE EDUCATION/TRAINING PROGRAM

## 2023-08-17 PROCEDURE — 1159F PR MEDICATION LIST DOCUMENTED IN MEDICAL RECORD: ICD-10-PCS | Mod: CPTII,S$GLB,, | Performed by: STUDENT IN AN ORGANIZED HEALTH CARE EDUCATION/TRAINING PROGRAM

## 2023-08-17 PROCEDURE — 3078F DIAST BP <80 MM HG: CPT | Mod: CPTII,S$GLB,, | Performed by: STUDENT IN AN ORGANIZED HEALTH CARE EDUCATION/TRAINING PROGRAM

## 2023-08-17 PROCEDURE — 96374 THER/PROPH/DIAG INJ IV PUSH: CPT

## 2023-08-17 PROCEDURE — 3078F PR MOST RECENT DIASTOLIC BLOOD PRESSURE < 80 MM HG: ICD-10-PCS | Mod: CPTII,S$GLB,, | Performed by: STUDENT IN AN ORGANIZED HEALTH CARE EDUCATION/TRAINING PROGRAM

## 2023-08-17 PROCEDURE — 3008F BODY MASS INDEX DOCD: CPT | Mod: CPTII,S$GLB,, | Performed by: STUDENT IN AN ORGANIZED HEALTH CARE EDUCATION/TRAINING PROGRAM

## 2023-08-17 PROCEDURE — 1159F MED LIST DOCD IN RCRD: CPT | Mod: CPTII,S$GLB,, | Performed by: STUDENT IN AN ORGANIZED HEALTH CARE EDUCATION/TRAINING PROGRAM

## 2023-08-17 PROCEDURE — 63600175 PHARM REV CODE 636 W HCPCS: Performed by: NURSE PRACTITIONER

## 2023-08-17 PROCEDURE — 99999 PR PBB SHADOW E&M-EST. PATIENT-LVL III: ICD-10-PCS | Mod: PBBFAC,,, | Performed by: STUDENT IN AN ORGANIZED HEALTH CARE EDUCATION/TRAINING PROGRAM

## 2023-08-17 PROCEDURE — 1160F PR REVIEW ALL MEDS BY PRESCRIBER/CLIN PHARMACIST DOCUMENTED: ICD-10-PCS | Mod: CPTII,S$GLB,, | Performed by: STUDENT IN AN ORGANIZED HEALTH CARE EDUCATION/TRAINING PROGRAM

## 2023-08-17 PROCEDURE — 3074F PR MOST RECENT SYSTOLIC BLOOD PRESSURE < 130 MM HG: ICD-10-PCS | Mod: CPTII,S$GLB,, | Performed by: STUDENT IN AN ORGANIZED HEALTH CARE EDUCATION/TRAINING PROGRAM

## 2023-08-17 PROCEDURE — 3074F SYST BP LT 130 MM HG: CPT | Mod: CPTII,S$GLB,, | Performed by: STUDENT IN AN ORGANIZED HEALTH CARE EDUCATION/TRAINING PROGRAM

## 2023-08-17 PROCEDURE — 3008F PR BODY MASS INDEX (BMI) DOCUMENTED: ICD-10-PCS | Mod: CPTII,S$GLB,, | Performed by: STUDENT IN AN ORGANIZED HEALTH CARE EDUCATION/TRAINING PROGRAM

## 2023-08-17 PROCEDURE — 99214 OFFICE O/P EST MOD 30 MIN: CPT | Mod: S$GLB,,, | Performed by: STUDENT IN AN ORGANIZED HEALTH CARE EDUCATION/TRAINING PROGRAM

## 2023-08-17 PROCEDURE — 1160F RVW MEDS BY RX/DR IN RCRD: CPT | Mod: CPTII,S$GLB,, | Performed by: STUDENT IN AN ORGANIZED HEALTH CARE EDUCATION/TRAINING PROGRAM

## 2023-08-17 PROCEDURE — 99999 PR PBB SHADOW E&M-EST. PATIENT-LVL III: CPT | Mod: PBBFAC,,, | Performed by: STUDENT IN AN ORGANIZED HEALTH CARE EDUCATION/TRAINING PROGRAM

## 2023-08-17 RX ORDER — SODIUM CHLORIDE 0.9 % (FLUSH) 0.9 %
10 SYRINGE (ML) INJECTION
Status: CANCELLED | OUTPATIENT
Start: 2023-08-24

## 2023-08-17 RX ORDER — HEPARIN 100 UNIT/ML
500 SYRINGE INTRAVENOUS
Status: CANCELLED | OUTPATIENT
Start: 2023-08-24

## 2023-08-17 RX ORDER — DIPHENHYDRAMINE HYDROCHLORIDE 50 MG/ML
50 INJECTION INTRAMUSCULAR; INTRAVENOUS ONCE AS NEEDED
Status: CANCELLED | OUTPATIENT
Start: 2023-08-24

## 2023-08-17 RX ORDER — EPINEPHRINE 0.3 MG/.3ML
0.3 INJECTION SUBCUTANEOUS ONCE AS NEEDED
Status: CANCELLED | OUTPATIENT
Start: 2023-08-24

## 2023-08-17 RX ADMIN — IRON SUCROSE 200 MG: 20 INJECTION, SOLUTION INTRAVENOUS at 01:08

## 2023-08-17 NOTE — PROGRESS NOTES
"Subjective:       Patient ID: Rustam Matthews is a 62 y.o. male.    Chief Complaint: follow-up    Diagnosis: Iron Deficiency Anemia    Current Treatment:   Venofer weekly 250mg x 4 - August '23    Treatment History:   Venofer April - May '23    HPI  61yo M who presents in April 2023 for evaluation of anemia. He has required at least 2 blood transfusions in December'22 and March '23 for his anemia with hgb commonly <7. He has had a colonoscopy and EGD within the last 3-4 months prior to presentation that was reportedly normal although I do not have this report. He is on plavix and aspirin as dictated by his cardiologist for vascular stents. He has never required blood transfusions prior to this. He notes occasional dark stools. Denies any abdominal pain, weight loss, diet changes.   He denies being symptomatic at the time of his blood transfusions. Notices his leg pain is improved after blood transfusions. He has tried oral iron pills previously and has been unable to tolerate due to GI side effects    Interval History:  Mr. Matthews presents today for follow-up.   Had small bowel pill cam this week for further evaluation of his persistent iron deficiency anemia.   Continues to have severe iron deficiency requring IV iron infusions.   Will evaluate for hemolysis, but otherwise this iron deficiency is due to blood loss of unknown source.   He denies any shortness of breath, obvious signs of blood loss, or chest pain.     Past Medical History:   Diagnosis Date    Diabetes mellitus     Heart disease     Hypertension     Peripheral artery disease       Past Surgical History:   Procedure Laterality Date    PLACEMENT-STENT      pt states "in one of my legs"     Social History     Socioeconomic History    Marital status: Unknown   Tobacco Use    Smoking status: Former     Current packs/day: 0.00     Types: Cigarettes    Smokeless tobacco: Never   Substance and Sexual Activity    Alcohol use: Not Currently    Drug use: Never "     Social Determinants of Health     Financial Resource Strain: Low Risk  (6/1/2023)    Overall Financial Resource Strain (CARDIA)     Difficulty of Paying Living Expenses: Not hard at all   Food Insecurity: No Food Insecurity (6/1/2023)    Hunger Vital Sign     Worried About Running Out of Food in the Last Year: Never true     Ran Out of Food in the Last Year: Never true   Transportation Needs: No Transportation Needs (6/1/2023)    PRAPARE - Transportation     Lack of Transportation (Medical): No     Lack of Transportation (Non-Medical): No   Physical Activity: Inactive (6/1/2023)    Exercise Vital Sign     Days of Exercise per Week: 0 days     Minutes of Exercise per Session: 0 min   Stress: No Stress Concern Present (6/1/2023)    [employer] [place] - Occupational Stress Questionnaire     Feeling of Stress : Not at all   Social Connections: Socially Isolated (6/1/2023)    Social Connection and Isolation Panel [NHANES]     Frequency of Communication with Friends and Family: Twice a week     Frequency of Social Gatherings with Friends and Family: Never     Attends Temple Services: 1 to 4 times per year     Active Member of Clubs or Organizations: No     Attends Club or Organization Meetings: Never     Marital Status:    Housing Stability: Low Risk  (6/1/2023)    Housing Stability Vital Sign     Unable to Pay for Housing in the Last Year: No     Number of Places Lived in the Last Year: 1     Unstable Housing in the Last Year: No      History reviewed. No pertinent family history.   Review of patient's allergies indicates:   Allergen Reactions    Allergenic extract-food-shrimp Rash     All seafood      Lactose Rash    Mayonnaise Rash      Review of Systems   Constitutional:  Negative for appetite change and unexpected weight change.   HENT:  Negative for mouth sores.    Eyes:  Negative for visual disturbance.   Respiratory:  Negative for cough and shortness of breath.     Cardiovascular:  Negative for chest pain.   Gastrointestinal:  Negative for abdominal pain and diarrhea.   Genitourinary:  Negative for frequency.   Musculoskeletal:  Negative for back pain.   Integumentary:  Negative for rash.   Neurological:  Negative for headaches.   Hematological:  Negative for adenopathy.   Psychiatric/Behavioral:  The patient is not nervous/anxious.          Objective:      Vitals:    08/17/23 1323   BP: (!) 111/55   Pulse: (!) 58   Resp: 20   Temp: 97.8 °F (36.6 °C)       Physical Exam  Constitutional:       General: He is not in acute distress.     Appearance: Normal appearance. He is not ill-appearing.   HENT:      Head: Normocephalic and atraumatic.      Nose: Nose normal.      Mouth/Throat:      Mouth: Mucous membranes are moist.      Pharynx: Oropharynx is clear.   Eyes:      Extraocular Movements: Extraocular movements intact.      Conjunctiva/sclera: Conjunctivae normal.      Pupils: Pupils are equal, round, and reactive to light.   Cardiovascular:      Rate and Rhythm: Normal rate and regular rhythm.      Pulses: Normal pulses.      Heart sounds: Normal heart sounds. No murmur heard.  Pulmonary:      Effort: Pulmonary effort is normal. No respiratory distress.      Breath sounds: Normal breath sounds.   Abdominal:      General: There is no distension.      Palpations: Abdomen is soft.      Tenderness: There is no abdominal tenderness.   Musculoskeletal:         General: Normal range of motion.      Cervical back: Normal range of motion and neck supple.      Right lower leg: No edema.      Left lower leg: No edema.   Lymphadenopathy:      Cervical: No cervical adenopathy.   Skin:     General: Skin is warm and dry.   Neurological:      General: No focal deficit present.      Mental Status: He is alert and oriented to person, place, and time.       LABS AND IMAGING REVIEWED IN EPIC    Component      Latest Ref Rng & Units 4/13/2023   WBC      4.5 - 11.5 x10(3)/mcL 8.8   RBC      4.70 -  6.10 x10(6)/mcL 2.75 (L)   Hemoglobin      14.0 - 18.0 g/dL 7.3 (L)   Hematocrit      42.0 - 52.0 % 25.0 (L)   MCV      80.0 - 94.0 fL 90.9   MCH      27.0 - 31.0 pg 26.5 (L)   MCHC      33.0 - 36.0 g/dL 29.2 (L)   RDW      11.5 - 17.0 % 17.5 (H)   Platelets      130 - 400 x10(3)/mcL 236   MPV      7.4 - 10.4 fL 9.7     Component      Latest Ref Rng & Units 4/13/2023   Iron Binding Capacity Unsaturated      69 - 240 ug/dL 276 (H)   Iron      65 - 175 ug/dL 48 (L)   TIBC      250 - 450 ug/dL 324   Iron Saturation      20 - 50 % 15 (L)     Component      Latest Ref Rng & Units 4/13/2023   Ferritin      21.81 - 274.66 ng/mL 12.92 (L)       Assessment:   Iron Deficiency Anemia - requiring hospitalizations/numerous blood transfusions.         Plan:         - Plan for 5 more weekly doses of Venofer as his iron is not fully replete.   -He was encouraged to see nephrology for the management of his rising creatinine and low potassium, we have faxed his labs and called his nephrologist office with the values.   -We will do additional labs to r/o hemolysis including hapto, LDH, Rectic count, PNH, and peripheral smear.   - RTC 6 weeks for NP visit, labs same day to assess response to iron  - Continue weekly Venofer repletion    I spent a total of 35 minutes on the day of the visit.This includes face to face time and non-face to face time preparing to see the patient (eg, review of tests), obtaining and/or reviewing separately obtained history, documenting clinical information in the electronic or other health record, independently interpreting results and communicating results to the patient/family/caregiver, or care coordinator.    Elizabeth LeJeune, MD  Hematology/Oncology

## 2023-08-24 ENCOUNTER — INFUSION (OUTPATIENT)
Dept: INFUSION THERAPY | Facility: HOSPITAL | Age: 63
End: 2023-08-24
Attending: NURSE PRACTITIONER
Payer: MEDICARE

## 2023-08-24 VITALS
HEART RATE: 63 BPM | WEIGHT: 294.81 LBS | HEIGHT: 75 IN | SYSTOLIC BLOOD PRESSURE: 101 MMHG | DIASTOLIC BLOOD PRESSURE: 63 MMHG | TEMPERATURE: 98 F | OXYGEN SATURATION: 98 % | BODY MASS INDEX: 36.65 KG/M2

## 2023-08-24 DIAGNOSIS — D50.0 IRON DEFICIENCY ANEMIA DUE TO CHRONIC BLOOD LOSS: Primary | ICD-10-CM

## 2023-08-24 PROCEDURE — 96374 THER/PROPH/DIAG INJ IV PUSH: CPT

## 2023-08-24 PROCEDURE — 63600175 PHARM REV CODE 636 W HCPCS: Performed by: NURSE PRACTITIONER

## 2023-08-24 RX ORDER — EPINEPHRINE 0.3 MG/.3ML
0.3 INJECTION SUBCUTANEOUS ONCE AS NEEDED
Status: CANCELLED | OUTPATIENT
Start: 2023-08-31

## 2023-08-24 RX ORDER — SODIUM CHLORIDE 0.9 % (FLUSH) 0.9 %
10 SYRINGE (ML) INJECTION
Status: CANCELLED | OUTPATIENT
Start: 2023-08-31

## 2023-08-24 RX ORDER — HEPARIN 100 UNIT/ML
500 SYRINGE INTRAVENOUS
Status: CANCELLED | OUTPATIENT
Start: 2023-08-31

## 2023-08-24 RX ORDER — DIPHENHYDRAMINE HYDROCHLORIDE 50 MG/ML
50 INJECTION INTRAMUSCULAR; INTRAVENOUS ONCE AS NEEDED
Status: CANCELLED | OUTPATIENT
Start: 2023-08-31

## 2023-08-24 RX ADMIN — IRON SUCROSE 200 MG: 20 INJECTION, SOLUTION INTRAVENOUS at 03:08

## 2023-08-31 ENCOUNTER — INFUSION (OUTPATIENT)
Dept: INFUSION THERAPY | Facility: HOSPITAL | Age: 63
End: 2023-08-31
Attending: FAMILY MEDICINE
Payer: MEDICARE

## 2023-08-31 VITALS
DIASTOLIC BLOOD PRESSURE: 69 MMHG | WEIGHT: 291.38 LBS | TEMPERATURE: 98 F | HEIGHT: 75 IN | BODY MASS INDEX: 36.23 KG/M2 | OXYGEN SATURATION: 96 % | SYSTOLIC BLOOD PRESSURE: 126 MMHG | HEART RATE: 70 BPM

## 2023-08-31 DIAGNOSIS — D50.0 IRON DEFICIENCY ANEMIA DUE TO CHRONIC BLOOD LOSS: Primary | ICD-10-CM

## 2023-08-31 PROCEDURE — 96365 THER/PROPH/DIAG IV INF INIT: CPT

## 2023-08-31 PROCEDURE — 63600175 PHARM REV CODE 636 W HCPCS: Performed by: NURSE PRACTITIONER

## 2023-08-31 RX ORDER — SODIUM CHLORIDE 0.9 % (FLUSH) 0.9 %
10 SYRINGE (ML) INJECTION
OUTPATIENT
Start: 2023-09-07

## 2023-08-31 RX ORDER — HEPARIN 100 UNIT/ML
500 SYRINGE INTRAVENOUS
OUTPATIENT
Start: 2023-09-07

## 2023-08-31 RX ORDER — DIPHENHYDRAMINE HYDROCHLORIDE 50 MG/ML
50 INJECTION INTRAMUSCULAR; INTRAVENOUS ONCE AS NEEDED
OUTPATIENT
Start: 2023-09-07

## 2023-08-31 RX ORDER — EPINEPHRINE 0.3 MG/.3ML
0.3 INJECTION SUBCUTANEOUS ONCE AS NEEDED
OUTPATIENT
Start: 2023-09-07

## 2023-08-31 RX ADMIN — IRON SUCROSE 200 MG: 20 INJECTION, SOLUTION INTRAVENOUS at 03:08

## 2023-09-29 ENCOUNTER — LAB VISIT (OUTPATIENT)
Dept: LAB | Facility: HOSPITAL | Age: 63
End: 2023-09-29
Attending: INTERNAL MEDICINE
Payer: MEDICARE

## 2023-09-29 DIAGNOSIS — I10 ESSENTIAL HYPERTENSION, MALIGNANT: ICD-10-CM

## 2023-09-29 DIAGNOSIS — N18.4 CHRONIC KIDNEY DISEASE, STAGE IV (SEVERE): Primary | ICD-10-CM

## 2023-09-29 DIAGNOSIS — I73.9 PERIPHERAL VASCULAR DISEASE, UNSPECIFIED: ICD-10-CM

## 2023-09-29 DIAGNOSIS — I25.10 CORONARY ATHEROSCLEROSIS OF NATIVE CORONARY ARTERY: ICD-10-CM

## 2023-09-29 LAB
ALBUMIN SERPL-MCNC: 3.7 G/DL (ref 3.4–5)
AMORPH URATE CRY URNS QL MICRO: ABNORMAL /HPF
APPEARANCE UR: CLEAR
BACTERIA #/AREA URNS AUTO: ABNORMAL /HPF
BASOPHILS # BLD AUTO: 0.03 X10(3)/MCL (ref 0.01–0.08)
BASOPHILS NFR BLD AUTO: 0.5 % (ref 0.1–1.2)
BILIRUB UR QL STRIP.AUTO: NEGATIVE
BUN SERPL-MCNC: 46 MG/DL (ref 7–20)
CALCIUM SERPL-MCNC: 8.1 MG/DL (ref 8.4–10.2)
CALCIUM SERPL-MCNC: 8.1 MG/DL (ref 8.4–10.2)
CHLORIDE SERPL-SCNC: 107 MMOL/L (ref 98–110)
CO2 SERPL-SCNC: 22 MMOL/L (ref 21–32)
COLOR UR AUTO: YELLOW
CREAT SERPL-MCNC: 3.62 MG/DL (ref 0.66–1.25)
CREAT UR-MCNC: 103.6 MG/DL
CREAT/UREA NIT SERPL: 13 (ref 12–20)
EOSINOPHIL # BLD AUTO: 0.17 X10(3)/MCL (ref 0.04–0.54)
EOSINOPHIL NFR BLD AUTO: 2.7 % (ref 0.7–7)
ERYTHROCYTE [DISTWIDTH] IN BLOOD BY AUTOMATED COUNT: 13.2 %
FERRITIN SERPL-MCNC: 20.5 NG/ML (ref 17.9–464)
GFR SERPLBLD CREATININE-BSD FMLA CKD-EPI: 18 MLS/MIN/1.73/M2
GLUCOSE SERPL-MCNC: 134 MG/DL (ref 70–115)
GLUCOSE UR QL STRIP.AUTO: NEGATIVE
HCT VFR BLD AUTO: 21.7 % (ref 36–52)
HGB BLD-MCNC: 6.9 G/DL (ref 13–18)
HYALINE CASTS URNS QL MICRO: ABNORMAL /LPF
IMM GRANULOCYTES # BLD AUTO: 0.02 X10(3)/MCL (ref 0–0.03)
IMM GRANULOCYTES NFR BLD AUTO: 0.3 % (ref 0–0.5)
KETONES UR QL STRIP.AUTO: NEGATIVE
LEUKOCYTE ESTERASE UR QL STRIP.AUTO: ABNORMAL
LYMPHOCYTES # BLD AUTO: 1.07 X10(3)/MCL (ref 1.32–3.57)
LYMPHOCYTES NFR BLD AUTO: 16.9 % (ref 20–55)
MCH RBC QN AUTO: 32.1 PG (ref 27–34)
MCHC RBC AUTO-ENTMCNC: 31.8 G/DL (ref 31–37)
MCV RBC AUTO: 100.9 FL (ref 79–99)
MONOCYTES # BLD AUTO: 0.4 X10(3)/MCL (ref 0.3–0.82)
MONOCYTES NFR BLD AUTO: 6.3 % (ref 4.7–12.5)
NEUTROPHILS # BLD AUTO: 4.66 X10(3)/MCL (ref 1.78–5.38)
NEUTROPHILS NFR BLD AUTO: 73.3 % (ref 37–73)
NITRITE UR QL STRIP.AUTO: NEGATIVE
NRBC BLD AUTO-RTO: 0 %
PH UR STRIP.AUTO: 6 [PH]
PHOSPHATE SERPL-MCNC: 5 MG/DL (ref 2.5–4.9)
PLATELET # BLD AUTO: 187 X10(3)/MCL (ref 140–371)
PMV BLD AUTO: 8.9 FL (ref 9.4–12.4)
POTASSIUM SERPL-SCNC: 4 MMOL/L (ref 3.5–5.1)
PROT UR QL STRIP.AUTO: 100
PROT UR STRIP-MCNC: 80 MG/DL
PTH-INTACT SERPL-MCNC: 536.8 PG/ML (ref 14–73)
RBC # BLD AUTO: 2.15 X10(6)/MCL (ref 4–6)
RBC #/AREA URNS AUTO: ABNORMAL /HPF
RBC UR QL AUTO: ABNORMAL
SODIUM SERPL-SCNC: 141 MMOL/L (ref 135–145)
SP GR UR STRIP.AUTO: 1.02 (ref 1–1.03)
SQUAMOUS #/AREA URNS AUTO: ABNORMAL /HPF
UROBILINOGEN UR STRIP-ACNC: 0.2
WBC # SPEC AUTO: 6.35 X10(3)/MCL (ref 4–11.5)
WBC #/AREA URNS AUTO: ABNORMAL /HPF

## 2023-09-29 PROCEDURE — 87088 URINE BACTERIA CULTURE: CPT

## 2023-09-29 PROCEDURE — 85025 COMPLETE CBC W/AUTO DIFF WBC: CPT

## 2023-09-29 PROCEDURE — 83970 ASSAY OF PARATHORMONE: CPT

## 2023-09-29 PROCEDURE — 82570 ASSAY OF URINE CREATININE: CPT

## 2023-09-29 PROCEDURE — 80069 RENAL FUNCTION PANEL: CPT

## 2023-09-29 PROCEDURE — 36415 COLL VENOUS BLD VENIPUNCTURE: CPT

## 2023-09-29 PROCEDURE — 84156 ASSAY OF PROTEIN URINE: CPT

## 2023-09-29 PROCEDURE — 83550 IRON BINDING TEST: CPT

## 2023-09-29 PROCEDURE — 81001 URINALYSIS AUTO W/SCOPE: CPT

## 2023-09-29 PROCEDURE — 82728 ASSAY OF FERRITIN: CPT

## 2023-09-30 LAB
IRON SATN MFR SERPL: 25 % (ref 20–50)
IRON SERPL-MCNC: 65 UG/DL (ref 65–175)
TIBC SERPL-MCNC: 199 UG/DL (ref 69–240)
TIBC SERPL-MCNC: 264 UG/DL (ref 250–450)
TRANSFERRIN SERPL-MCNC: 239 MG/DL (ref 163–344)

## 2023-10-02 LAB — BACTERIA UR CULT: NO GROWTH

## 2023-10-06 DIAGNOSIS — D50.0 IRON DEFICIENCY ANEMIA DUE TO CHRONIC BLOOD LOSS: Primary | ICD-10-CM

## 2023-10-22 ENCOUNTER — HOSPITAL ENCOUNTER (EMERGENCY)
Facility: HOSPITAL | Age: 63
Discharge: LEFT BEFORE DISCHARGE | End: 2023-10-22
Attending: FAMILY MEDICINE
Payer: MEDICARE

## 2023-10-22 VITALS
TEMPERATURE: 99 F | WEIGHT: 292 LBS | BODY MASS INDEX: 36.31 KG/M2 | OXYGEN SATURATION: 97 % | RESPIRATION RATE: 18 BRPM | SYSTOLIC BLOOD PRESSURE: 103 MMHG | HEIGHT: 75 IN | DIASTOLIC BLOOD PRESSURE: 55 MMHG | HEART RATE: 87 BPM

## 2023-10-22 DIAGNOSIS — R07.9 CHEST PAIN: ICD-10-CM

## 2023-10-22 LAB
ABO + RH BLD: NORMAL
ABO + RH BLD: NORMAL
ABO AND RH: NORMAL
ALBUMIN SERPL-MCNC: 3.7 G/DL (ref 3.4–5)
ALBUMIN/GLOB SERPL: 1.1 RATIO
ALP SERPL-CCNC: 73 UNIT/L (ref 50–144)
ALT SERPL-CCNC: 14 UNIT/L (ref 1–45)
ANION GAP SERPL CALC-SCNC: 10 MEQ/L (ref 2–13)
ANTIBODY SCREEN: NORMAL
AST SERPL-CCNC: 24 UNIT/L (ref 17–59)
BASOPHILS # BLD AUTO: 0.03 X10(3)/MCL (ref 0.01–0.08)
BASOPHILS NFR BLD AUTO: 0.4 % (ref 0.1–1.2)
BILIRUB SERPL-MCNC: 0.4 MG/DL (ref 0–1)
BLD PROD TYP BPU: NORMAL
BLD PROD TYP BPU: NORMAL
BLOOD UNIT EXPIRATION DATE: NORMAL
BLOOD UNIT EXPIRATION DATE: NORMAL
BLOOD UNIT TYPE CODE: 5100
BLOOD UNIT TYPE CODE: 5100
BUN SERPL-MCNC: 66 MG/DL (ref 7–20)
CALCIUM SERPL-MCNC: 9 MG/DL (ref 8.4–10.2)
CHLORIDE SERPL-SCNC: 110 MMOL/L (ref 98–110)
CK MB SERPL-MCNC: 1.91 NG/ML (ref 0–3.38)
CK SERPL-CCNC: 241 U/L (ref 55–170)
CO2 SERPL-SCNC: 20 MMOL/L (ref 21–32)
CREAT SERPL-MCNC: 6.87 MG/DL (ref 0.66–1.25)
CREAT/UREA NIT SERPL: 10 (ref 12–20)
CROSSMATCH INTERPRETATION: NORMAL
CROSSMATCH INTERPRETATION: NORMAL
D DIMER PPP IA.FEU-MCNC: 0.44 MG/L (ref 0.19–0.5)
DISPENSE STATUS: NORMAL
DISPENSE STATUS: NORMAL
EOSINOPHIL # BLD AUTO: 0.14 X10(3)/MCL (ref 0.04–0.54)
EOSINOPHIL NFR BLD AUTO: 1.7 % (ref 0.7–7)
ERYTHROCYTE [DISTWIDTH] IN BLOOD BY AUTOMATED COUNT: 13.5 %
GFR SERPLBLD CREATININE-BSD FMLA CKD-EPI: 8 MLS/MIN/1.73/M2
GLOBULIN SER-MCNC: 3.3 GM/DL (ref 2–3.9)
GLUCOSE SERPL-MCNC: 138 MG/DL (ref 70–115)
HCT VFR BLD AUTO: 17.9 % (ref 36–52)
HGB BLD-MCNC: 5.6 G/DL (ref 13–18)
IMM GRANULOCYTES # BLD AUTO: 0.03 X10(3)/MCL (ref 0–0.03)
IMM GRANULOCYTES NFR BLD AUTO: 0.4 % (ref 0–0.5)
LYMPHOCYTES # BLD AUTO: 1.42 X10(3)/MCL (ref 1.32–3.57)
LYMPHOCYTES NFR BLD AUTO: 17.5 % (ref 20–55)
MCH RBC QN AUTO: 31.3 PG (ref 27–34)
MCHC RBC AUTO-ENTMCNC: 31.3 G/DL (ref 31–37)
MCV RBC AUTO: 100 FL (ref 79–99)
MONOCYTES # BLD AUTO: 0.63 X10(3)/MCL (ref 0.3–0.82)
MONOCYTES NFR BLD AUTO: 7.8 % (ref 4.7–12.5)
NEUTROPHILS # BLD AUTO: 5.86 X10(3)/MCL (ref 1.78–5.38)
NEUTROPHILS NFR BLD AUTO: 72.2 % (ref 37–73)
NRBC BLD AUTO-RTO: 0 %
PLATELET # BLD AUTO: 222 X10(3)/MCL (ref 140–371)
PLATELET # BLD EST: ADEQUATE 10*3/UL
PMV BLD AUTO: 9.4 FL (ref 9.4–12.4)
POTASSIUM SERPL-SCNC: 4.5 MMOL/L (ref 3.5–5.1)
PROT SERPL-MCNC: 7 GM/DL (ref 6.3–8.2)
RBC # BLD AUTO: 1.79 X10(6)/MCL (ref 4–6)
SODIUM SERPL-SCNC: 140 MMOL/L (ref 135–145)
SPECIMEN OUTDATE: NORMAL
TROPONIN I SERPL-MCNC: 0.02 NG/ML (ref 0–0.03)
UNIT NUMBER: NORMAL
UNIT NUMBER: NORMAL
WBC # SPEC AUTO: 8.11 X10(3)/MCL (ref 4–11.5)

## 2023-10-22 PROCEDURE — 36430 TRANSFUSION BLD/BLD COMPNT: CPT

## 2023-10-22 PROCEDURE — 93010 EKG 12-LEAD: ICD-10-PCS | Mod: ,,, | Performed by: INTERNAL MEDICINE

## 2023-10-22 PROCEDURE — 82553 CREATINE MB FRACTION: CPT | Performed by: NURSE PRACTITIONER

## 2023-10-22 PROCEDURE — 86850 RBC ANTIBODY SCREEN: CPT | Performed by: NURSE PRACTITIONER

## 2023-10-22 PROCEDURE — 93010 ELECTROCARDIOGRAM REPORT: CPT | Mod: ,,, | Performed by: INTERNAL MEDICINE

## 2023-10-22 PROCEDURE — 84484 ASSAY OF TROPONIN QUANT: CPT | Performed by: NURSE PRACTITIONER

## 2023-10-22 PROCEDURE — 85025 COMPLETE CBC W/AUTO DIFF WBC: CPT | Performed by: NURSE PRACTITIONER

## 2023-10-22 PROCEDURE — 82550 ASSAY OF CK (CPK): CPT | Performed by: NURSE PRACTITIONER

## 2023-10-22 PROCEDURE — P9016 RBC LEUKOCYTES REDUCED: HCPCS | Performed by: FAMILY MEDICINE

## 2023-10-22 PROCEDURE — 93005 ELECTROCARDIOGRAM TRACING: CPT

## 2023-10-22 PROCEDURE — 96360 HYDRATION IV INFUSION INIT: CPT

## 2023-10-22 PROCEDURE — 96361 HYDRATE IV INFUSION ADD-ON: CPT

## 2023-10-22 PROCEDURE — 80053 COMPREHEN METABOLIC PANEL: CPT | Performed by: NURSE PRACTITIONER

## 2023-10-22 PROCEDURE — 25000003 PHARM REV CODE 250: Performed by: FAMILY MEDICINE

## 2023-10-22 PROCEDURE — 85379 FIBRIN DEGRADATION QUANT: CPT | Performed by: NURSE PRACTITIONER

## 2023-10-22 PROCEDURE — 86920 COMPATIBILITY TEST SPIN: CPT | Performed by: FAMILY MEDICINE

## 2023-10-22 PROCEDURE — 99285 EMERGENCY DEPT VISIT HI MDM: CPT | Mod: 25

## 2023-10-22 RX ORDER — RANOLAZINE 500 MG/1
500 TABLET, EXTENDED RELEASE ORAL 2 TIMES DAILY
Status: ON HOLD | COMMUNITY
Start: 2023-09-26 | End: 2024-03-01 | Stop reason: HOSPADM

## 2023-10-22 RX ORDER — FUROSEMIDE 20 MG/1
20 TABLET ORAL DAILY
Status: ON HOLD | COMMUNITY
Start: 2023-10-01 | End: 2024-03-01 | Stop reason: HOSPADM

## 2023-10-22 RX ORDER — POTASSIUM CHLORIDE 750 MG/1
10 TABLET, FILM COATED, EXTENDED RELEASE ORAL DAILY
Status: ON HOLD | COMMUNITY
Start: 2023-10-02 | End: 2024-03-01 | Stop reason: HOSPADM

## 2023-10-22 RX ORDER — CILOSTAZOL 50 MG/1
50 TABLET ORAL 2 TIMES DAILY
COMMUNITY
Start: 2023-08-31 | End: 2023-11-01

## 2023-10-22 RX ORDER — CLOPIDOGREL BISULFATE 75 MG/1
75 TABLET ORAL DAILY
COMMUNITY
Start: 2023-10-11 | End: 2023-11-01

## 2023-10-22 RX ORDER — METOPROLOL SUCCINATE 100 MG/1
100 TABLET, EXTENDED RELEASE ORAL DAILY
COMMUNITY
Start: 2023-09-30

## 2023-10-22 RX ORDER — ROSUVASTATIN CALCIUM 40 MG/1
40 TABLET, COATED ORAL NIGHTLY
Status: ON HOLD | COMMUNITY
Start: 2023-08-07 | End: 2024-03-01 | Stop reason: HOSPADM

## 2023-10-22 RX ORDER — ASPIRIN 81 MG
81 TABLET,CHEWABLE ORAL DAILY
COMMUNITY
Start: 2023-07-12 | End: 2023-11-01

## 2023-10-22 RX ORDER — SODIUM BICARBONATE 650 MG/1
650 TABLET ORAL 2 TIMES DAILY
COMMUNITY
Start: 2023-09-03

## 2023-10-22 RX ORDER — HYDROCODONE BITARTRATE AND ACETAMINOPHEN 500; 5 MG/1; MG/1
TABLET ORAL
Status: DISCONTINUED | OUTPATIENT
Start: 2023-10-22 | End: 2023-10-23 | Stop reason: HOSPADM

## 2023-10-22 RX ORDER — CALCITRIOL 0.5 UG/1
0.5 CAPSULE ORAL
COMMUNITY
Start: 2023-10-11

## 2023-10-22 RX ORDER — OLMESARTAN MEDOXOMIL AND HYDROCHLOROTHIAZIDE 40/25 40; 25 MG/1; MG/1
1 TABLET ORAL DAILY
Status: ON HOLD | COMMUNITY
Start: 2023-09-26 | End: 2024-03-01 | Stop reason: HOSPADM

## 2023-10-22 RX ORDER — GLIMEPIRIDE 4 MG/1
4 TABLET ORAL DAILY
COMMUNITY
Start: 2023-09-30

## 2023-10-22 RX ADMIN — SODIUM CHLORIDE: 9 INJECTION, SOLUTION INTRAVENOUS at 04:10

## 2023-10-22 NOTE — ED PROVIDER NOTES
"Encounter Date: 10/22/2023    SCRIBE #1 NOTE: I, am scribing for, and in the presence of,  . I have scribed the entire note.       History     Chief Complaint   Patient presents with    Chest Pain     Pt. Report  L side CP for pass 3 days with Generalized weakness. Pt. Discribes Cp as constant with no causative or relieving factors reports associated N/v and dizziness. Denies SOB. Reports Hx of similar symptom "last time I needed to get blood."      62 yrs old male presents with left sided chest pain, dizziness, nausea, and generalized weakness x 3 days. Reports the last time this occurred, he needed to have a blood transfusion. Reports he has had EGD/Colonoscopy in the past year, but they have not been able to determine where the blood loss is coming from. Reports he also had a heart cath a long time ago and does not remember the results. Currently he does see a Cardiologist and reports the last time he saw him was a few months ago. Reports he needs to have another heart cath performed, but it has not been schedule as of yet.  Patient also reports she recently saw his nephrologist and has a copy of lab results in his car. Reports nephrologist was not to concerned about renal function. Patient reports he does not want to be transferred to another facility.    1615 - Reports he does not want to be transferred to another facility because he is not getting into an ambulance. Reports he will go by air if he needs to be transferred. Patient asked if he could drive himself to facility. Informed patient that because his renal function is low and he is severely anemic, we can't allow him to drive himself to another facility. Discussed case with Dr. Kelly who reports patient needs to get blood transfusion and after he receives blood transfusion, he can sign out against medical advice.    The history is provided by the patient.     Review of patient's allergies indicates:   Allergen Reactions    Allergenic " "extract-food-shrimp Rash     All seafood      Lactose Rash    Mayonnaise Rash     Past Medical History:   Diagnosis Date    Diabetes mellitus     Heart disease     Hypertension     Peripheral artery disease      Past Surgical History:   Procedure Laterality Date    PLACEMENT-STENT      pt states "in one of my legs"     No family history on file.  Social History     Tobacco Use    Smoking status: Former     Types: Cigarettes    Smokeless tobacco: Never   Substance Use Topics    Alcohol use: Not Currently    Drug use: Never     Review of Systems   Constitutional:  Positive for fatigue. Negative for appetite change, chills, diaphoresis and fever.   Respiratory:  Negative for apnea, cough, choking, chest tightness, shortness of breath, wheezing and stridor.    Cardiovascular:  Positive for chest pain. Negative for palpitations and leg swelling.   Gastrointestinal:  Positive for nausea and vomiting. Negative for abdominal pain, anal bleeding, blood in stool, constipation, diarrhea and rectal pain.   Neurological:  Positive for dizziness. Negative for tremors, seizures, syncope, facial asymmetry, speech difficulty, weakness, light-headedness, numbness and headaches.   All other systems reviewed and are negative.      Physical Exam     Initial Vitals [10/22/23 1459]   BP Pulse Resp Temp SpO2   (!) 116/50 81 16 98.6 °F (37 °C) 95 %      MAP       --         Physical Exam    Nursing note and vitals reviewed.  Constitutional: He appears well-developed and well-nourished.   HENT:   Head: Normocephalic and atraumatic.   Right Ear: External ear normal.   Left Ear: External ear normal.   Nose: Nose normal.   Mouth/Throat: Oropharynx is clear and moist.   Eyes: Conjunctivae and EOM are normal.   Neck: Neck supple.   Normal range of motion.  Cardiovascular:  Normal rate, regular rhythm, normal heart sounds, intact distal pulses and normal pulses.           Pulmonary/Chest: Breath sounds normal.   Abdominal: Abdomen is soft. Bowel " sounds are normal. There is no abdominal tenderness.   No right CVA tenderness.  No left CVA tenderness. There is no rebound and no guarding.   Genitourinary:    Prostate and rectum normal.   Rectum:      No rectal mass, anal fissure, tenderness, external hemorrhoid, internal hemorrhoid or abnormal anal tone.      Genitourinary Comments: Rectal exam performed with no bleeding noted on exam.      Musculoskeletal:         General: Normal range of motion.      Cervical back: Normal range of motion and neck supple.     Neurological: He is alert and oriented to person, place, and time. He has normal strength and normal reflexes. GCS score is 15. GCS eye subscore is 4. GCS verbal subscore is 5. GCS motor subscore is 6.   Skin: Skin is warm and dry. Capillary refill takes less than 2 seconds.   Psychiatric: He has a normal mood and affect. His behavior is normal. Judgment and thought content normal.         ED Course   Procedures  Labs Reviewed   COMPREHENSIVE METABOLIC PANEL - Abnormal; Notable for the following components:       Result Value    Carbon Dioxide 20 (*)     Glucose Level 138 (*)     Blood Urea Nitrogen 66.0 (*)     Creatinine 6.87 (*)     BUN/Creatinine Ratio 10 (*)     All other components within normal limits   CK - Abnormal; Notable for the following components:    Creatine Kinase 241 (*)     All other components within normal limits   CBC WITH DIFFERENTIAL - Abnormal; Notable for the following components:    RBC 1.79 (*)     Hgb 5.6 (*)     Hct 17.9 (*)     .0 (*)     Lymph % 17.5 (*)     Neut # 5.86 (*)     All other components within normal limits   CK-MB - Normal   TROPONIN I - Normal   D DIMER, QUANTITATIVE - Normal   BLOOD SMEAR MICROSCOPIC EXAM (OLG) - Normal   CBC W/ AUTO DIFFERENTIAL    Narrative:     The following orders were created for panel order CBC auto differential.  Procedure                               Abnormality         Status                     ---------                                -----------         ------                     CBC with Differential[[id number]]       Abnormal            Final result                 Please view results for these tests on the individual orders.   TYPE & SCREEN   PREPARE RBC SOFT     EKG Readings: (Independently Interpreted)   Initial Reading: No STEMI. Rhythm: Normal Sinus Rhythm. Ectopy: No Ectopy. Conduction: Normal. Axis: Normal. Other Findings: Prolonged QT Interval. Clinical Impression: Normal Sinus Rhythm   Reviewed by Dr. Kelly       Imaging Results              X-Ray Chest AP Portable (Final result)  Result time 10/22/23 16:00:05      Final result by Andre Slater MD (10/22/23 16:00:05)                   Impression:      1. No evidence of lobar type consolidation or acute cardiac decompensation noted.      Electronically signed by: Andre Slater MD  Date:    10/22/2023  Time:    16:00               Narrative:    EXAMINATION:  XR CHEST AP PORTABLE    CLINICAL HISTORY:  Chest pain.    TECHNIQUE:  1 view of the chest.    COMPARISON:  08/04/2023    FINDINGS:  The lungs demonstrate no evidence of lobar type consolidation, visible pneumothorax, or pleural fluid.  Vascular calcifications are noted.  The cardiac silhouette is within normal limits for size.   No acute displaced fracture is seen.                                       CT Head Without Contrast (Final result)  Result time 10/22/23 15:52:39      Final result by Andre Slater MD (10/22/23 15:52:39)                   Impression:      1. No acute intracranial abnormalities identified.      Electronically signed by: Andre Slater MD  Date:    10/22/2023  Time:    15:52               Narrative:    EXAMINATION:  CT HEAD WITHOUT CONTRAST    CLINICAL HISTORY:  Dizziness.    TECHNIQUE:  Axial CT images were obtained of the brain without intravenous contrast.  Coronal and sagittal reformations were obtained.  Automated exposure control utilized to reduce radiation dose.  Total exam DLP is 1044  mGy cm.    COMPARISON:  01/29/2016    FINDINGS:  There is motion artifact.  Gray-white matter differentiation is within normal limits. There is chronic involutional change.  There is chronic white matter microischemic change.  There is intracranial atherosclerosis.  No acute intracranial hemorrhage, extra-axial fluid collection, hydrocephalus, mass effect, or midline shift is noted.  No large vessel territory acute ischemia is identified.  Visualized paranasal sinuses are clear.  Visualized mastoid air cells are clear.  No acute displaced calvarial fracture is identified.                                       Medications - No data to display    Medical Decision Making  Patient came in with left-sided chest pain dizzy and generalized weakness we checked the patient hemoglobin it was 5.6 patient was evaluated he had EGD and colonoscopy done last year and they were not sure where the bleeding was coming from the patient is also on Plavix the stents were placed about 1 year ago suspect the bleeding is secondary to the Plavix he was also followed by physician in [place] and they were giving him iron infusions the picture is not microcytic it is rather macrocytic I will check the vitamin B12 levels I will transfuse the patient with 2 units of packed RBCs as far as the cardiac history is concerned Dr. Gamboa wanted to put coronary intervention but he wanted to make sure that the patient's source of bleeding is identified 1st-----apparently the patient has been having bleeding and I suspect that it is because of his Plavix I was going to transfer the patient because patient has elevated creatinine and we do not have Nephrology patient declined transfer said that he will sign out AMA once he gets the transfusion risks and complications explained patient was told that he can always reverse his decision and if he needed transfer we will find placement for him because hemoglobin of 5.6 is critical risks and complications  explained to the patient patient exhibited understanding care was transferred to dr dasilva    Amount and/or Complexity of Data Reviewed  Labs: ordered.  Radiology: ordered.    Risk  Prescription drug management.               ED Course as of 10/29/23 0415   Sun Oct 22, 2023   2236 Patient has not yet against medical advice.  Does not want to be transferred.  Was transfused 2 units by the morning ED provider.  Transfusion has been completed as patient is leaving.  Patient understands risks of leaving and understands he can return at any time.  Risks include death, heart attack, stroke, permanent disability. [UR]      ED Course User Index  [UR] Dolly Floyd MD                    Clinical Impression:   Final diagnoses:  [R07.9] Chest pain        ED Disposition Condition    AMA Stable                Evgeny Juan FNP  10/22/23 1720       Minh Kelly MD  10/22/23 1813       Minh Kelly MD  10/29/23 0413

## 2023-10-23 NOTE — ED PROVIDER NOTES
"Encounter Date: 10/22/2023       History     Chief Complaint   Patient presents with    Chest Pain     Pt. Report  L side CP for pass 3 days with Generalized weakness. Pt. Discribes Cp as constant with no causative or relieving factors reports associated N/v and dizziness. Denies SOB. Reports Hx of similar symptom "last time I needed to get blood."      HPI  Review of patient's allergies indicates:   Allergen Reactions    Allergenic extract-food-shrimp Rash     All seafood      Lactose Rash    Mayonnaise Rash     Past Medical History:   Diagnosis Date    Diabetes mellitus     Heart disease     Hypertension     Peripheral artery disease      Past Surgical History:   Procedure Laterality Date    PLACEMENT-STENT      pt states "in one of my legs"     No family history on file.  Social History     Tobacco Use    Smoking status: Former     Types: Cigarettes    Smokeless tobacco: Never   Substance Use Topics    Alcohol use: Not Currently    Drug use: Never     Review of Systems    Physical Exam     Initial Vitals [10/22/23 1459]   BP Pulse Resp Temp SpO2   (!) 116/50 81 16 98.6 °F (37 °C) 95 %      MAP       --         Physical Exam    ED Course   Procedures  Labs Reviewed   COMPREHENSIVE METABOLIC PANEL - Abnormal; Notable for the following components:       Result Value    Carbon Dioxide 20 (*)     Glucose Level 138 (*)     Blood Urea Nitrogen 66.0 (*)     Creatinine 6.87 (*)     BUN/Creatinine Ratio 10 (*)     All other components within normal limits   CK - Abnormal; Notable for the following components:    Creatine Kinase 241 (*)     All other components within normal limits   CBC WITH DIFFERENTIAL - Abnormal; Notable for the following components:    RBC 1.79 (*)     Hgb 5.6 (*)     Hct 17.9 (*)     .0 (*)     Lymph % 17.5 (*)     Neut # 5.86 (*)     All other components within normal limits   CK-MB - Normal   TROPONIN I - Normal   D DIMER, QUANTITATIVE - Normal   BLOOD SMEAR MICROSCOPIC EXAM (OLG) - Normal "   CBC W/ AUTO DIFFERENTIAL    Narrative:     The following orders were created for panel order CBC auto differential.  Procedure                               Abnormality         Status                     ---------                               -----------         ------                     CBC with Differential[[id number]]       Abnormal            Final result                 Please view results for these tests on the individual orders.   URINALYSIS   TYPE & SCREEN   PREPARE RBC SOFT          Imaging Results              X-Ray Chest AP Portable (Final result)  Result time 10/22/23 16:00:05      Final result by Andre Slater MD (10/22/23 16:00:05)                   Impression:      1. No evidence of lobar type consolidation or acute cardiac decompensation noted.      Electronically signed by: Andre Slater MD  Date:    10/22/2023  Time:    16:00               Narrative:    EXAMINATION:  XR CHEST AP PORTABLE    CLINICAL HISTORY:  Chest pain.    TECHNIQUE:  1 view of the chest.    COMPARISON:  08/04/2023    FINDINGS:  The lungs demonstrate no evidence of lobar type consolidation, visible pneumothorax, or pleural fluid.  Vascular calcifications are noted.  The cardiac silhouette is within normal limits for size.   No acute displaced fracture is seen.                                       CT Head Without Contrast (Final result)  Result time 10/22/23 15:52:39      Final result by Andre Slater MD (10/22/23 15:52:39)                   Impression:      1. No acute intracranial abnormalities identified.      Electronically signed by: Andre Slater MD  Date:    10/22/2023  Time:    15:52               Narrative:    EXAMINATION:  CT HEAD WITHOUT CONTRAST    CLINICAL HISTORY:  Dizziness.    TECHNIQUE:  Axial CT images were obtained of the brain without intravenous contrast.  Coronal and sagittal reformations were obtained.  Automated exposure control utilized to reduce radiation dose.  Total exam DLP is 1044 mGy  cm.    COMPARISON:  01/29/2016    FINDINGS:  There is motion artifact.  Gray-white matter differentiation is within normal limits. There is chronic involutional change.  There is chronic white matter microischemic change.  There is intracranial atherosclerosis.  No acute intracranial hemorrhage, extra-axial fluid collection, hydrocephalus, mass effect, or midline shift is noted.  No large vessel territory acute ischemia is identified.  Visualized paranasal sinuses are clear.  Visualized mastoid air cells are clear.  No acute displaced calvarial fracture is identified.                                       Medications   0.9%  NaCl infusion (for blood administration) ( Intravenous New Bag 10/22/23 1648)     Medical Decision Making  Amount and/or Complexity of Data Reviewed  Labs: ordered.  Radiology: ordered.    Risk  Prescription drug management.               ED Course as of 10/22/23 2237   Sun Oct 22, 2023   2236 Patient has not yet against medical advice.  Does not want to be transferred.  Was transfused 2 units by the morning ED provider.  Transfusion has been completed as patient is leaving.  Patient understands risks of leaving and understands he can return at any time.  Risks include death, heart attack, stroke, permanent disability. [UR]      ED Course User Index  [UR] Dolly Floyd MD                    Clinical Impression:   Final diagnoses:  [R07.9] Chest pain        ED Disposition Condition    AMA                 Dolly Floyd MD  10/22/23 2237

## 2023-11-01 ENCOUNTER — HOSPITAL ENCOUNTER (EMERGENCY)
Facility: HOSPITAL | Age: 63
Discharge: ANOTHER HOSPITAL | End: 2023-11-01
Attending: FAMILY MEDICINE
Payer: MEDICARE

## 2023-11-01 VITALS
RESPIRATION RATE: 14 BRPM | HEIGHT: 75 IN | HEART RATE: 95 BPM | SYSTOLIC BLOOD PRESSURE: 128 MMHG | TEMPERATURE: 98 F | BODY MASS INDEX: 33.57 KG/M2 | OXYGEN SATURATION: 100 % | WEIGHT: 270 LBS | DIASTOLIC BLOOD PRESSURE: 53 MMHG

## 2023-11-01 DIAGNOSIS — N18.9 ACUTE RENAL FAILURE SUPERIMPOSED ON CHRONIC KIDNEY DISEASE, UNSPECIFIED ACUTE RENAL FAILURE TYPE, UNSPECIFIED CKD STAGE: ICD-10-CM

## 2023-11-01 DIAGNOSIS — N17.9 ACUTE RENAL FAILURE SUPERIMPOSED ON CHRONIC KIDNEY DISEASE, UNSPECIFIED ACUTE RENAL FAILURE TYPE, UNSPECIFIED CKD STAGE: ICD-10-CM

## 2023-11-01 DIAGNOSIS — D64.9 ANEMIA, UNSPECIFIED TYPE: Primary | ICD-10-CM

## 2023-11-01 DIAGNOSIS — K92.2 UPPER GI BLEED: ICD-10-CM

## 2023-11-01 DIAGNOSIS — R07.9 CHEST PAIN: ICD-10-CM

## 2023-11-01 DIAGNOSIS — R07.9 CHEST PAIN, UNSPECIFIED TYPE: ICD-10-CM

## 2023-11-01 LAB
ABO AND RH: NORMAL
ALBUMIN SERPL-MCNC: 3.8 G/DL (ref 3.4–5)
ALBUMIN/GLOB SERPL: 1.2 RATIO
ALP SERPL-CCNC: 67 UNIT/L (ref 50–144)
ALT SERPL-CCNC: 22 UNIT/L (ref 1–45)
ANION GAP SERPL CALC-SCNC: 13 MEQ/L (ref 2–13)
ANTIBODY SCREEN: NORMAL
APTT PPP: 23.9 SECONDS (ref 23–29.4)
AST SERPL-CCNC: 27 UNIT/L (ref 17–59)
BASOPHILS # BLD AUTO: 0.01 X10(3)/MCL (ref 0.01–0.08)
BASOPHILS NFR BLD AUTO: 0.1 % (ref 0.1–1.2)
BILIRUB SERPL-MCNC: 0.4 MG/DL (ref 0–1)
BUN SERPL-MCNC: 58 MG/DL (ref 7–20)
CALCIUM SERPL-MCNC: 9 MG/DL (ref 8.4–10.2)
CHLORIDE SERPL-SCNC: 107 MMOL/L (ref 98–110)
CK SERPL-CCNC: 720 U/L (ref 55–170)
CO2 SERPL-SCNC: 20 MMOL/L (ref 21–32)
CREAT SERPL-MCNC: 5.67 MG/DL (ref 0.66–1.25)
CREAT/UREA NIT SERPL: 10 (ref 12–20)
D DIMER PPP IA.FEU-MCNC: 0.42 MG/L (ref 0.19–0.5)
EOSINOPHIL # BLD AUTO: 0.11 X10(3)/MCL (ref 0.04–0.54)
EOSINOPHIL NFR BLD AUTO: 1.3 % (ref 0.7–7)
ERYTHROCYTE [DISTWIDTH] IN BLOOD BY AUTOMATED COUNT: 17.7 %
GFR SERPLBLD CREATININE-BSD FMLA CKD-EPI: 11 MLS/MIN/1.73/M2
GLOBULIN SER-MCNC: 3.3 GM/DL (ref 2–3.9)
GLUCOSE SERPL-MCNC: 261 MG/DL (ref 70–115)
HCT VFR BLD AUTO: 18.8 % (ref 36–52)
HGB BLD-MCNC: 5.9 G/DL (ref 13–18)
IMM GRANULOCYTES # BLD AUTO: 0.01 X10(3)/MCL (ref 0–0.03)
IMM GRANULOCYTES NFR BLD AUTO: 0.1 % (ref 0–0.5)
INR PPP: 1.1
LYMPHOCYTES # BLD AUTO: 1.53 X10(3)/MCL (ref 1.32–3.57)
LYMPHOCYTES NFR BLD AUTO: 18.5 % (ref 20–55)
MACROCYTES BLD QL SMEAR: ABNORMAL
MCH RBC QN AUTO: 30.4 PG (ref 27–34)
MCHC RBC AUTO-ENTMCNC: 31.4 G/DL (ref 31–37)
MCV RBC AUTO: 96.9 FL (ref 79–99)
MONOCYTES # BLD AUTO: 0.55 X10(3)/MCL (ref 0.3–0.82)
MONOCYTES NFR BLD AUTO: 6.7 % (ref 4.7–12.5)
NEUTROPHILS # BLD AUTO: 6.05 X10(3)/MCL (ref 1.78–5.38)
NEUTROPHILS NFR BLD AUTO: 73.3 % (ref 37–73)
PLATELET # BLD AUTO: 257 X10(3)/MCL (ref 140–371)
PLATELET # BLD EST: ADEQUATE 10*3/UL
PMV BLD AUTO: 9.6 FL (ref 9.4–12.4)
POLYCHROMASIA BLD QL SMEAR: SLIGHT
POTASSIUM SERPL-SCNC: 3.7 MMOL/L (ref 3.5–5.1)
PROT SERPL-MCNC: 7.1 GM/DL (ref 6.3–8.2)
PROTHROMBIN TIME: 11 SECONDS (ref 9.3–11.9)
RBC # BLD AUTO: 1.94 X10(6)/MCL (ref 4–6)
RBC MORPH BLD: ABNORMAL
SODIUM SERPL-SCNC: 140 MMOL/L (ref 135–145)
SPECIMEN OUTDATE: NORMAL
TROPONIN I SERPL-MCNC: 0.03 NG/ML (ref 0–0.03)
WBC # SPEC AUTO: 8.26 X10(3)/MCL (ref 4–11.5)

## 2023-11-01 PROCEDURE — 93010 ELECTROCARDIOGRAM REPORT: CPT | Mod: ,,, | Performed by: INTERNAL MEDICINE

## 2023-11-01 PROCEDURE — 85610 PROTHROMBIN TIME: CPT | Performed by: FAMILY MEDICINE

## 2023-11-01 PROCEDURE — 36430 TRANSFUSION BLD/BLD COMPNT: CPT

## 2023-11-01 PROCEDURE — 93005 ELECTROCARDIOGRAM TRACING: CPT

## 2023-11-01 PROCEDURE — 80053 COMPREHEN METABOLIC PANEL: CPT | Performed by: FAMILY MEDICINE

## 2023-11-01 PROCEDURE — 85379 FIBRIN DEGRADATION QUANT: CPT | Performed by: FAMILY MEDICINE

## 2023-11-01 PROCEDURE — 96374 THER/PROPH/DIAG INJ IV PUSH: CPT

## 2023-11-01 PROCEDURE — 84484 ASSAY OF TROPONIN QUANT: CPT | Performed by: FAMILY MEDICINE

## 2023-11-01 PROCEDURE — 86901 BLOOD TYPING SEROLOGIC RH(D): CPT | Performed by: FAMILY MEDICINE

## 2023-11-01 PROCEDURE — 63600175 PHARM REV CODE 636 W HCPCS: Performed by: FAMILY MEDICINE

## 2023-11-01 PROCEDURE — C9113 INJ PANTOPRAZOLE SODIUM, VIA: HCPCS | Performed by: FAMILY MEDICINE

## 2023-11-01 PROCEDURE — 82550 ASSAY OF CK (CPK): CPT | Performed by: FAMILY MEDICINE

## 2023-11-01 PROCEDURE — 86920 COMPATIBILITY TEST SPIN: CPT | Performed by: FAMILY MEDICINE

## 2023-11-01 PROCEDURE — 99285 EMERGENCY DEPT VISIT HI MDM: CPT | Mod: 25

## 2023-11-01 PROCEDURE — P9016 RBC LEUKOCYTES REDUCED: HCPCS | Performed by: FAMILY MEDICINE

## 2023-11-01 PROCEDURE — 85730 THROMBOPLASTIN TIME PARTIAL: CPT | Performed by: FAMILY MEDICINE

## 2023-11-01 PROCEDURE — 93010 EKG 12-LEAD: ICD-10-PCS | Mod: ,,, | Performed by: INTERNAL MEDICINE

## 2023-11-01 PROCEDURE — 85025 COMPLETE CBC W/AUTO DIFF WBC: CPT | Performed by: FAMILY MEDICINE

## 2023-11-01 RX ORDER — PANTOPRAZOLE SODIUM 40 MG/10ML
80 INJECTION, POWDER, LYOPHILIZED, FOR SOLUTION INTRAVENOUS
Status: COMPLETED | OUTPATIENT
Start: 2023-11-01 | End: 2023-11-01

## 2023-11-01 RX ORDER — SILDENAFIL 100 MG/1
100 TABLET, FILM COATED ORAL DAILY PRN
COMMUNITY

## 2023-11-01 RX ORDER — HYDROCODONE BITARTRATE AND ACETAMINOPHEN 500; 5 MG/1; MG/1
TABLET ORAL
Status: DISCONTINUED | OUTPATIENT
Start: 2023-11-01 | End: 2023-11-01 | Stop reason: HOSPADM

## 2023-11-01 RX ORDER — OMEPRAZOLE 40 MG/1
40 CAPSULE, DELAYED RELEASE ORAL DAILY
Status: ON HOLD | COMMUNITY
Start: 2023-10-30 | End: 2024-03-01 | Stop reason: HOSPADM

## 2023-11-01 RX ADMIN — PANTOPRAZOLE SODIUM 80 MG: 40 INJECTION, POWDER, FOR SOLUTION INTRAVENOUS at 03:11

## 2023-11-01 NOTE — ED PROVIDER NOTES
"Encounter Date: 11/1/2023       History     Chief Complaint   Patient presents with    Abnormal Labs     Pt reports being sent by Dr. Vasquez Rodriguez secondary to low blood levels and needing a transfusion. Pt reports having labs drawn in office yesterday. Pt also reporting chest pain w/ no relief w/ SL NTG at home. Pt states "I had a blood transfusion done here last week." Pt reports seeing cardiology on Monday and has been having chest pain since. Pt reports stopping plavix Monday per cardiology    Chest Pain     Patient states he is here in the emergency room on advice from his PCP to get blood transfusion.  He had in office labs done yesterday.  He has a history of having a blood transfusion done last week.  He has a multi month history of for what he states is vomiting up blood.  He is followed by a gastroenterologist in [place].  He denies abdominal pain.  He does states he intermittent chest pains.  This has been going on for the past several days sometimes it lasts for 5 minutes sometimes 10 minutes.  Relates this to his cardiologist's whom we saw 2 days ago.  He also relates this to his PCP who we saw yesterday.  The patient states that when his blood count gets low at often aggravates his anginal type symptoms.  He denies current chest pain        Review of patient's allergies indicates:   Allergen Reactions    Allergenic extract-food-shrimp Rash     All seafood      Lactose Rash    Mayonnaise Rash     Past Medical History:   Diagnosis Date    Diabetes mellitus     Heart disease     Hypertension     Peripheral artery disease      Past Surgical History:   Procedure Laterality Date    PLACEMENT-STENT      pt states "in one of my legs"     History reviewed. No pertinent family history.  Social History     Tobacco Use    Smoking status: Former     Types: Cigarettes    Smokeless tobacco: Never   Substance Use Topics    Alcohol use: Not Currently    Drug use: Never     Review of Systems   Constitutional:  " Positive for appetite change.   Respiratory: Negative.     Cardiovascular:  Positive for chest pain.   Gastrointestinal:  Positive for nausea and vomiting.        Dark red emesis       Physical Exam     Initial Vitals [11/01/23 1337]   BP Pulse Resp Temp SpO2   103/63 103 17 98.7 °F (37.1 °C) 99 %      MAP       --         Physical Exam    Constitutional: He appears well-developed and well-nourished.   Eyes: EOM are normal. Pupils are equal, round, and reactive to light.   Cardiovascular:  Normal rate, regular rhythm and normal heart sounds.           Pulmonary/Chest: Breath sounds normal.   Abdominal: Abdomen is soft. Bowel sounds are normal.   Musculoskeletal:         General: Normal range of motion.     Neurological: He is alert and oriented to person, place, and time.   Skin: Skin is warm and dry.         ED Course   Critical Care    Date/Time: 11/1/2023 1:27 PM    Performed by: Mark Mak MD  Authorized by: Mark aMk MD  Direct patient critical care time: 12 minutes  Additional history critical care time: 10 minutes  Ordering / reviewing critical care time: 10 minutes  Documentation critical care time: 9 minutes  Consulting other physicians critical care time: 6 minutes  Total critical care time (exclusive of procedural time) : 47 minutes  Critical care was necessary to treat or prevent imminent or life-threatening deterioration of the following conditions: circulatory failure.  Critical care was time spent personally by me on the following activities: development of treatment plan with patient or surrogate, discussions with primary provider, interpretation of cardiac output measurements, evaluation of patient's response to treatment, examination of patient, obtaining history from patient or surrogate, ordering and performing treatments and interventions, ordering and review of laboratory studies, ordering and review of radiographic studies, re-evaluation of patient's condition and review of  old charts.        Labs Reviewed   COMPREHENSIVE METABOLIC PANEL - Abnormal; Notable for the following components:       Result Value    Carbon Dioxide 20 (*)     Glucose Level 261 (*)     Blood Urea Nitrogen 58.0 (*)     Creatinine 5.67 (*)     BUN/Creatinine Ratio 10 (*)     All other components within normal limits   CK - Abnormal; Notable for the following components:    Creatine Kinase 720 (*)     All other components within normal limits   CBC WITH DIFFERENTIAL - Abnormal; Notable for the following components:    RBC 1.94 (*)     Hgb 5.9 (*)     Hct 18.8 (*)     Neut % 73.3 (*)     Lymph % 18.5 (*)     Neut # 6.05 (*)     All other components within normal limits   BLOOD SMEAR MICROSCOPIC EXAM (OLG) - Abnormal; Notable for the following components:    RBC Morph Abnormal (*)     Macrocytosis 1+ (*)     Polychromasia Slight (*)     All other components within normal limits   PROTIME-INR - Normal    Narrative:     Protimes are used to monitor anticoagulant agents such as warfarin. PT INR values are based on the current patient normal mean and the ANTONIO value for the specific instrument reagent used.  **Routine theraputic target values for the INR are 2.0-3.0**   APTT - Normal   D DIMER, QUANTITATIVE - Normal   TROPONIN I - Normal   CBC W/ AUTO DIFFERENTIAL    Narrative:     The following orders were created for panel order CBC auto differential.  Procedure                               Abnormality         Status                     ---------                               -----------         ------                     CBC with Differential[[id number]]       Abnormal            Final result                 Please view results for these tests on the individual orders.   TYPE & SCREEN     EKG Readings: (Independently Interpreted)   Initial Reading: No STEMI. Rhythm: Normal Sinus Rhythm. Heart Rate: 96. ST Segments: Normal ST Segments. T Waves: Normal. Other Findings: Prolonged QT Interval.       Imaging Results               X-Ray Chest AP Portable (Final result)  Result time 11/01/23 16:25:01      Final result by Bertrand Remy MD (11/01/23 16:25:01)                   Impression:      No acute abnormality.      Electronically signed by: Bertrand Remy  Date:    11/01/2023  Time:    16:25               Narrative:    EXAMINATION:  XR CHEST AP PORTABLE    CLINICAL HISTORY:  Chest pain, unspecified    TECHNIQUE:  Single frontal view of the chest was performed.    COMPARISON:  10/22/2023    FINDINGS:  The patient is imaged with a shallow depth of inspiration and there is a gentle chronic appearing accentuation to the interstitial markings without a focal, lobar or segmental infiltrate identified.  The cardiac silhouette is within normal limits for size.  Atherosclerotic calcification is noted within the aortic arch.  Mild hypertrophic degenerative findings are noted involving the acromioclavicular articulation and mildly involving the thoracic spine.                                       Medications   0.9%  NaCl infusion (for blood administration) (has no administration in time range)   pantoprazole injection 80 mg (80 mg Intravenous Given 11/1/23 1548)     Medical Decision Making  Amount and/or Complexity of Data Reviewed  Labs: ordered.  Radiology: ordered.  Discussion of management or test interpretation with external provider(s): Case discussed with the hospitalist.  With the patient's recurring anemia of what appears to be in GI source, it was felt that he would need access to GI services.  Therefore we will go about the patient's trying to transfer the patient for such.    Risk  Prescription drug management.                               Clinical Impression:   Final diagnoses:  [R07.9] Chest pain  [D64.9] Anemia, unspecified type (Primary)  [N17.9, N18.9] Acute renal failure superimposed on chronic kidney disease, unspecified acute renal failure type, unspecified CKD stage  [R07.9] Chest pain, unspecified type  [K92.2] Upper GI  bleed        ED Disposition Condition    Transfer to Another Facility Mark Villarreal MD  11/01/23 9282

## 2023-11-28 ENCOUNTER — LAB VISIT (OUTPATIENT)
Dept: LAB | Facility: HOSPITAL | Age: 63
End: 2023-11-28
Attending: INTERNAL MEDICINE
Payer: MEDICARE

## 2023-11-28 DIAGNOSIS — I99.8 VENOUS COLLATERAL CIRCULATION, ANY SITE: Primary | ICD-10-CM

## 2023-11-28 LAB
HCT VFR BLD AUTO: 28.4 % (ref 36–52)
HGB BLD-MCNC: 9 G/DL (ref 13–18)

## 2023-11-28 PROCEDURE — 36415 COLL VENOUS BLD VENIPUNCTURE: CPT

## 2023-11-28 PROCEDURE — 85014 HEMATOCRIT: CPT

## 2023-12-11 ENCOUNTER — LAB VISIT (OUTPATIENT)
Dept: LAB | Facility: HOSPITAL | Age: 63
End: 2023-12-11
Attending: INTERNAL MEDICINE
Payer: MEDICARE

## 2023-12-11 DIAGNOSIS — I99.8 VENOUS COLLATERAL CIRCULATION, ANY SITE: Primary | ICD-10-CM

## 2023-12-11 LAB
HCT VFR BLD AUTO: 29.8 % (ref 36–52)
HGB BLD-MCNC: 9.5 G/DL (ref 13–18)

## 2023-12-11 PROCEDURE — 85014 HEMATOCRIT: CPT

## 2023-12-11 PROCEDURE — 36415 COLL VENOUS BLD VENIPUNCTURE: CPT

## 2023-12-14 ENCOUNTER — HOSPITAL ENCOUNTER (OUTPATIENT)
Dept: RADIOLOGY | Facility: HOSPITAL | Age: 63
Discharge: HOME | End: 2023-12-14
Attending: INTERNAL MEDICINE
Payer: MEDICARE

## 2023-12-14 DIAGNOSIS — I10 HTN (HYPERTENSION): ICD-10-CM

## 2023-12-14 PROCEDURE — 71046 X-RAY EXAM CHEST 2 VIEWS: CPT | Mod: TC

## 2023-12-27 RX ORDER — ASPIRIN 81 MG/1
81 TABLET ORAL DAILY
Status: ON HOLD | COMMUNITY

## 2023-12-27 RX ORDER — CLOPIDOGREL BISULFATE 75 MG/1
75 TABLET ORAL DAILY
Status: ON HOLD | COMMUNITY
Start: 2023-12-11

## 2023-12-27 RX ORDER — ACYCLOVIR 400 MG/1
400 TABLET ORAL
Status: ON HOLD | COMMUNITY
Start: 2023-11-26

## 2024-01-04 ENCOUNTER — HOSPITAL ENCOUNTER (OUTPATIENT)
Facility: HOSPITAL | Age: 64
Discharge: HOME | End: 2024-01-04
Attending: INTERNAL MEDICINE | Admitting: INTERNAL MEDICINE
Payer: MEDICARE

## 2024-01-04 ENCOUNTER — HOSPITAL ENCOUNTER (OUTPATIENT)
Dept: CARDIOLOGY | Facility: HOSPITAL | Age: 64
Discharge: HOME | End: 2024-01-04
Attending: INTERNAL MEDICINE | Admitting: INTERNAL MEDICINE
Payer: MEDICARE

## 2024-01-04 DIAGNOSIS — I73.9 CLAUDICATION: ICD-10-CM

## 2024-01-04 DIAGNOSIS — I20.89 STABLE ANGINA PECTORIS: Primary | ICD-10-CM

## 2024-01-04 DIAGNOSIS — R94.39 ABNORMAL CARDIOVASCULAR STRESS TEST: ICD-10-CM

## 2024-01-04 DIAGNOSIS — I20.89 STABLE ANGINA PECTORIS: ICD-10-CM

## 2024-01-04 LAB
ANION GAP SERPL CALC-SCNC: 8 MEQ/L (ref 2–13)
AORTIC VALVE CUSP SEPERATION: 2.12 CM
AV INDEX (PROSTH): 0.76
AV MEAN GRADIENT: 4 MMHG
AV PEAK GRADIENT: 8 MMHG
AV VALVE AREA BY VELOCITY RATIO: 2.51 CM²
AV VALVE AREA: 2.69 CM²
AV VELOCITY RATIO: 0.7
BUN SERPL-MCNC: 22 MG/DL (ref 7–20)
CALCIUM SERPL-MCNC: 9.2 MG/DL (ref 8.4–10.2)
CHLORIDE SERPL-SCNC: 109 MMOL/L (ref 98–110)
CO2 SERPL-SCNC: 25 MMOL/L (ref 21–32)
CREAT SERPL-MCNC: 3.19 MG/DL (ref 0.66–1.25)
CREAT/UREA NIT SERPL: 7 (ref 12–20)
CV ECHO LV RWT: 0.44 CM
DOP CALC AO PEAK VEL: 1.42 M/S
DOP CALC AO VTI: 33.7 CM
DOP CALC LVOT AREA: 3.6 CM2
DOP CALC LVOT DIAMETER: 2.13 CM
DOP CALC LVOT PEAK VEL: 1 M/S
DOP CALC LVOT STROKE VOLUME: 90.82 CM3
DOP CALCLVOT PEAK VEL VTI: 25.5 CM
E WAVE DECELERATION TIME: 171.5 MSEC
E/A RATIO: 0.96
E/E' RATIO: 8.67 M/S
ECHO LV POSTERIOR WALL: 1.27 CM (ref 0.6–1.1)
FRACTIONAL SHORTENING: 29 % (ref 28–44)
GFR SERPLBLD CREATININE-BSD FMLA CKD-EPI: 21 MLS/MIN/1.73/M2
GLUCOSE SERPL-MCNC: 91 MG/DL (ref 70–115)
HCT VFR BLD AUTO: 33.9 % (ref 36–52)
HGB BLD-MCNC: 10.6 G/DL (ref 13–18)
INFERIOR VENA CAVA SIZE SNIFF: 0.6 CM
INTERVENTRICULAR SEPTUM: 1.15 CM (ref 0.6–1.1)
IVC DIAMETER: 1.5 CM
LEFT ATRIUM SIZE: 4.98 CM
LEFT ATRIUM VOLUME MOD: 84.3 CM3
LEFT INTERNAL DIMENSION IN SYSTOLE: 4.04 CM (ref 2.1–4)
LEFT VENTRICLE DIASTOLIC VOLUME: 161.3 ML
LEFT VENTRICLE SYSTOLIC VOLUME: 71.7 ML
LEFT VENTRICULAR INTERNAL DIMENSION IN DIASTOLE: 5.72 CM (ref 3.5–6)
LEFT VENTRICULAR MASS: 293.63 G
LV LATERAL E/E' RATIO: 10.83 M/S
LV SEPTAL E/E' RATIO: 7.22 M/S
LVOT MG: 2 MMHG
LVOT MV: 0.63 CM/S
MV PEAK A VEL: 0.68 M/S
MV PEAK E VEL: 0.65 M/S
PISA TR MAX VEL: 1.03 M/S
POCT GLUCOSE: 83 MG/DL (ref 70–110)
POTASSIUM SERPL-SCNC: 3.4 MMOL/L (ref 3.5–5.1)
RA PRESSURE ESTIMATED: 3 MMHG
RV TB RVSP: 4 MMHG
SODIUM SERPL-SCNC: 142 MMOL/L (ref 135–145)
TDI LATERAL: 0.06 M/S
TDI SEPTAL: 0.09 M/S
TDI: 0.08 M/S
TR MAX PG: 4 MMHG
TRICUSPID ANNULAR PLANE SYSTOLIC EXCURSION: 2.21 CM
TV REST PULMONARY ARTERY PRESSURE: 7 MMHG

## 2024-01-04 PROCEDURE — C8929 TTE W OR WO FOL WCON,DOPPLER: HCPCS

## 2024-01-04 PROCEDURE — 99153 MOD SED SAME PHYS/QHP EA: CPT | Performed by: INTERNAL MEDICINE

## 2024-01-04 PROCEDURE — C1894 INTRO/SHEATH, NON-LASER: HCPCS | Performed by: INTERNAL MEDICINE

## 2024-01-04 PROCEDURE — 25000003 PHARM REV CODE 250

## 2024-01-04 PROCEDURE — 25000003 PHARM REV CODE 250: Performed by: INTERNAL MEDICINE

## 2024-01-04 PROCEDURE — 99152 MOD SED SAME PHYS/QHP 5/>YRS: CPT | Performed by: INTERNAL MEDICINE

## 2024-01-04 PROCEDURE — 85018 HEMOGLOBIN: CPT

## 2024-01-04 PROCEDURE — C1769 GUIDE WIRE: HCPCS | Performed by: INTERNAL MEDICINE

## 2024-01-04 PROCEDURE — 25500020 PHARM REV CODE 255: Performed by: INTERNAL MEDICINE

## 2024-01-04 PROCEDURE — 63600175 PHARM REV CODE 636 W HCPCS: Performed by: INTERNAL MEDICINE

## 2024-01-04 PROCEDURE — 93458 L HRT ARTERY/VENTRICLE ANGIO: CPT | Performed by: INTERNAL MEDICINE

## 2024-01-04 PROCEDURE — 93799 UNLISTED CV SVC/PROCEDURE: CPT | Performed by: INTERNAL MEDICINE

## 2024-01-04 PROCEDURE — 80048 BASIC METABOLIC PNL TOTAL CA: CPT

## 2024-01-04 PROCEDURE — 36415 COLL VENOUS BLD VENIPUNCTURE: CPT

## 2024-01-04 RX ORDER — FAMOTIDINE 10 MG/ML
20 INJECTION INTRAVENOUS DAILY
Status: DISCONTINUED | OUTPATIENT
Start: 2024-01-04 | End: 2024-01-04

## 2024-01-04 RX ORDER — SODIUM CHLORIDE 9 MG/ML
INJECTION, SOLUTION INTRAVENOUS CONTINUOUS
Status: DISCONTINUED | OUTPATIENT
Start: 2024-01-04 | End: 2024-01-04 | Stop reason: HOSPADM

## 2024-01-04 RX ORDER — FAMOTIDINE 10 MG/ML
20 INJECTION INTRAVENOUS
Status: DISCONTINUED | OUTPATIENT
Start: 2024-01-04 | End: 2024-01-04 | Stop reason: HOSPADM

## 2024-01-04 RX ORDER — DIPHENHYDRAMINE HYDROCHLORIDE 50 MG/ML
50 INJECTION, SOLUTION INTRAMUSCULAR; INTRAVENOUS EVERY 6 HOURS PRN
Status: DISCONTINUED | OUTPATIENT
Start: 2024-01-04 | End: 2024-01-04

## 2024-01-04 RX ORDER — METHYLPREDNISOLONE SOD SUCC 125 MG
60 VIAL (EA) INJECTION ONCE
Status: COMPLETED | OUTPATIENT
Start: 2024-01-04 | End: 2024-01-04

## 2024-01-04 RX ORDER — ASPIRIN 325 MG
325 TABLET ORAL
Status: DISPENSED | OUTPATIENT
Start: 2024-01-04

## 2024-01-04 RX ORDER — DIAZEPAM 5 MG/1
10 TABLET ORAL
Status: DISPENSED | OUTPATIENT
Start: 2024-01-04

## 2024-01-04 RX ORDER — MIDAZOLAM HYDROCHLORIDE 1 MG/ML
INJECTION, SOLUTION INTRAMUSCULAR; INTRAVENOUS
Status: DISCONTINUED | OUTPATIENT
Start: 2024-01-04 | End: 2024-01-04 | Stop reason: HOSPADM

## 2024-01-04 RX ORDER — SODIUM CHLORIDE 9 MG/ML
INJECTION, SOLUTION INTRAVENOUS CONTINUOUS
Status: ACTIVE | OUTPATIENT
Start: 2024-01-04 | End: 2024-01-04

## 2024-01-04 RX ORDER — LIDOCAINE HYDROCHLORIDE 20 MG/ML
INJECTION, SOLUTION EPIDURAL; INFILTRATION; INTRACAUDAL; PERINEURAL
Status: DISCONTINUED | OUTPATIENT
Start: 2024-01-04 | End: 2024-01-04 | Stop reason: HOSPADM

## 2024-01-04 RX ORDER — HEPARIN SODIUM 1000 [USP'U]/ML
INJECTION, SOLUTION INTRAVENOUS; SUBCUTANEOUS
Status: DISCONTINUED | OUTPATIENT
Start: 2024-01-04 | End: 2024-01-04 | Stop reason: HOSPADM

## 2024-01-04 RX ORDER — FENTANYL CITRATE 50 UG/ML
INJECTION, SOLUTION INTRAMUSCULAR; INTRAVENOUS
Status: DISCONTINUED | OUTPATIENT
Start: 2024-01-04 | End: 2024-01-04 | Stop reason: HOSPADM

## 2024-01-04 RX ORDER — DIPHENHYDRAMINE HCL 25 MG
50 CAPSULE ORAL
Status: DISCONTINUED | OUTPATIENT
Start: 2024-01-04 | End: 2024-01-04

## 2024-01-04 RX ORDER — DIPHENHYDRAMINE HYDROCHLORIDE 50 MG/ML
50 INJECTION, SOLUTION INTRAMUSCULAR; INTRAVENOUS ONCE
Status: COMPLETED | OUTPATIENT
Start: 2024-01-04 | End: 2024-01-04

## 2024-01-04 RX ADMIN — DIAZEPAM 10 MG: 5 TABLET ORAL at 09:01

## 2024-01-04 RX ADMIN — ASPIRIN 325 MG ORAL TABLET 325 MG: 325 PILL ORAL at 09:01

## 2024-01-04 RX ADMIN — DIPHENHYDRAMINE HYDROCHLORIDE 50 MG: 50 INJECTION INTRAMUSCULAR; INTRAVENOUS at 09:01

## 2024-01-04 RX ADMIN — METHYLPREDNISOLONE SODIUM SUCCINATE 60 MG: 125 INJECTION, POWDER, FOR SOLUTION INTRAMUSCULAR; INTRAVENOUS at 09:01

## 2024-01-04 RX ADMIN — PERFLUTREN 2 ML: 6.52 INJECTION, SUSPENSION INTRAVENOUS at 01:01

## 2024-01-04 RX ADMIN — FAMOTIDINE 20 MG: 10 INJECTION, SOLUTION INTRAVENOUS at 09:01

## 2024-01-04 RX ADMIN — SODIUM CHLORIDE: 9 INJECTION, SOLUTION INTRAVENOUS at 09:01

## 2024-01-04 NOTE — DISCHARGE SUMMARY
[facility]-Cath Lab/EP  Discharge Note  Short Stay    Procedure(s) (LRB):  ANGIOGRAM, CORONARY ARTERY (Right)      OUTCOME: Patient tolerated treatment/procedure well without complication and is now ready for discharge.    DISPOSITION: Home or Self Care    FINAL DIAGNOSIS:  CAD for CABG evaluation    FOLLOWUP: In clinic    DISCHARGE INSTRUCTIONS:  radial    TIME SPENT ON DISCHARGE: 35 minutes

## 2024-01-04 NOTE — Clinical Note
The catheter was inserted over the wire into the ostium   left main. An angiography was performed of the left coronary arteries. Multiple views were taken. The angiography was performed via power injection. The injected amount was 10 mL contrast at 4 mL/s. The PSI from the power injection was 600. Exchanged for FR 4 post images.

## 2024-01-04 NOTE — PLAN OF CARE
Pt will have no bleeding or hematoma at [facility] access site by discharge.

## 2024-01-04 NOTE — Clinical Note
The left DP pulse was detected with doppler. The right DP pulse was 1+.  The left PT pulse was detected with doppler. The right PT pulse was 1+. The left radial pulse was allens test positive and 1+. The right radial pulse was +2 and allens test positive.

## 2024-01-04 NOTE — Clinical Note
The catheter was inserted over the wire into the ostium   right coronary artery. An angiography was performed of the right coronary arteries. Multiple views were taken. The angiography was performed via power injection. The injected amount was 10 mL contrast at 4 mL/s. The PSI from the power injection was 600. Exchanged for EBU 3.0 post images.

## 2024-01-04 NOTE — DISCHARGE INSTRUCTIONS
WRIST ACCESS: No lifting over 5 lbs for 3 days with that arm/hand, wait 24 hrs before driving, avoid flexing at the wrist such as hammering, playing tennis, or swinging objects.    Take it easy for the rest of the day. Keep arm or leg straight as much as possible. If catheter was put in your groin, avoid using stairs for a few days. When you must use stairs, step up with the leg that was not used for the angiogram.     Keep the catheter wound area clean and dry for 24 hours after your angiogram. You may shower 24 hours after your angiogram. Refrain from taking a tub bath, swimming, hot tubs, and submerging in dish water for 5 days. During shower, gently wash your angiogram site with soap and water. If the site is oozing or bleeding slightly, place a small bandage over it to protect your clothes.     If the place where the catheter was inserted starts to bleed, use your hand to put pressure on the bandage. It is better if someone else hold pressure for you. Also, call for help. Hold pressure for 30 minutes, even after bleeding stops. Lie flat for at least an hour. If bleeding does not stop within 15 minutes of holding pressure, you will need to go to the nearest hospital. Do not walk or drive yourself.     Drink plenty of liquids to help your body rid of the dye that was used during the angiogram. Drink eight (8 oz) glasses of water each day. Limit the amount of caffeine you drink such as coffee, tea, and soda. Do not drink alcohol for 24 hours after the test. Taking these actions are critical for the health of your kidneys.     REASONS TO CALL YOUR DOCTOR:    You have shaking, chills, or a body temperature over 101.5 F  The puncture site becomes red or has pus or foul-smelling drainage coming from it.  You have increasing pain at the puncture site. (It is normal to have soreness, but this should get better, not worse).  You have questions or concerns about your angiogram, medicines, or health condition.     SEEK  CARE IMMEDIATELY IF:  The leg or arm used for the angiogram becomes numb, is very painful, or changes color.  The bruise site starts to get bigger, or the area has new swelling.     IT IS AN EMERGENCY:  The puncture site is bleeding and you cannot stop the bleeding.  You have signs of a stroke..new weakness, trouble moving one side of your face or body, new trouble thinking or speaking, and new changes in vision.

## 2024-01-10 DIAGNOSIS — I25.10 CORONARY ARTERY DISEASE: Primary | ICD-10-CM

## 2024-01-12 VITALS
WEIGHT: 284 LBS | SYSTOLIC BLOOD PRESSURE: 158 MMHG | HEIGHT: 75 IN | HEART RATE: 71 BPM | DIASTOLIC BLOOD PRESSURE: 74 MMHG | RESPIRATION RATE: 17 BRPM | TEMPERATURE: 98 F | BODY MASS INDEX: 35.31 KG/M2 | OXYGEN SATURATION: 99 %

## 2024-01-19 ENCOUNTER — LAB VISIT (OUTPATIENT)
Dept: LAB | Facility: HOSPITAL | Age: 64
End: 2024-01-19
Attending: INTERNAL MEDICINE
Payer: MEDICARE

## 2024-01-19 DIAGNOSIS — I25.10 CORONARY ARTERY DISEASE, UNSPECIFIED VESSEL OR LESION TYPE, UNSPECIFIED WHETHER ANGINA PRESENT, UNSPECIFIED WHETHER NATIVE OR TRANSPLANTED HEART: ICD-10-CM

## 2024-01-19 DIAGNOSIS — E11.69 DIABETES MELLITUS ASSOCIATED WITH HORMONAL ETIOLOGY: ICD-10-CM

## 2024-01-19 DIAGNOSIS — I25.10 CORONARY ATHEROSCLEROSIS OF NATIVE CORONARY ARTERY: ICD-10-CM

## 2024-01-19 DIAGNOSIS — R31.9 HEMATURIA SYNDROME: ICD-10-CM

## 2024-01-19 DIAGNOSIS — I10 ESSENTIAL HYPERTENSION, MALIGNANT: ICD-10-CM

## 2024-01-19 DIAGNOSIS — N18.4 CHRONIC KIDNEY DISEASE, STAGE IV (SEVERE): Primary | ICD-10-CM

## 2024-01-19 LAB
ALBUMIN SERPL-MCNC: 3.9 G/DL (ref 3.4–5)
APPEARANCE UR: CLEAR
BACTERIA #/AREA URNS AUTO: ABNORMAL /HPF
BASOPHILS # BLD AUTO: 0.03 X10(3)/MCL (ref 0.01–0.08)
BASOPHILS NFR BLD AUTO: 0.5 % (ref 0.1–1.2)
BILIRUB UR QL STRIP.AUTO: NEGATIVE
BUN SERPL-MCNC: 19 MG/DL (ref 7–20)
CALCIUM SERPL-MCNC: 8.9 MG/DL (ref 8.4–10.2)
CALCIUM SERPL-MCNC: 9.1 MG/DL (ref 8.4–10.2)
CHLORIDE SERPL-SCNC: 109 MMOL/L (ref 98–110)
CO2 SERPL-SCNC: 27 MMOL/L (ref 21–32)
COLOR UR AUTO: YELLOW
CREAT SERPL-MCNC: 3.25 MG/DL (ref 0.66–1.25)
CREAT UR-MCNC: 68.2 MG/DL (ref 63–166)
CREAT UR-MCNC: 69.7 MG/DL
CREAT/UREA NIT SERPL: 6 (ref 12–20)
EOSINOPHIL # BLD AUTO: 0.21 X10(3)/MCL (ref 0.04–0.54)
EOSINOPHIL NFR BLD AUTO: 3.4 % (ref 0.7–7)
ERYTHROCYTE [DISTWIDTH] IN BLOOD BY AUTOMATED COUNT: 14.2 %
GFR SERPLBLD CREATININE-BSD FMLA CKD-EPI: 20 MLS/MIN/1.73/M2
GLUCOSE SERPL-MCNC: 154 MG/DL (ref 70–115)
GLUCOSE UR QL STRIP.AUTO: 100
GRAN CASTS URNS QL MICRO: ABNORMAL /LPF
HCT VFR BLD AUTO: 33.3 % (ref 36–52)
HGB BLD-MCNC: 10.3 G/DL (ref 13–18)
HYALINE CASTS URNS QL MICRO: ABNORMAL /LPF
IMM GRANULOCYTES # BLD AUTO: 0.01 X10(3)/MCL (ref 0–0.03)
IMM GRANULOCYTES NFR BLD AUTO: 0.2 % (ref 0–0.5)
KETONES UR QL STRIP.AUTO: NEGATIVE
LEUKOCYTE ESTERASE UR QL STRIP.AUTO: NEGATIVE
LYMPHOCYTES # BLD AUTO: 1.32 X10(3)/MCL (ref 1.32–3.57)
LYMPHOCYTES NFR BLD AUTO: 21.3 % (ref 20–55)
MCH RBC QN AUTO: 28.2 PG (ref 27–34)
MCHC RBC AUTO-ENTMCNC: 30.9 G/DL (ref 31–37)
MCV RBC AUTO: 91.2 FL (ref 79–99)
MICROALBUMIN UR-MCNC: 98.2 UG/ML
MICROALBUMIN/CREAT RATIO PNL UR: 144 MG/GM CR (ref 0–30)
MONOCYTES # BLD AUTO: 0.41 X10(3)/MCL (ref 0.3–0.82)
MONOCYTES NFR BLD AUTO: 6.6 % (ref 4.7–12.5)
NEUTROPHILS # BLD AUTO: 4.21 X10(3)/MCL (ref 1.78–5.38)
NEUTROPHILS NFR BLD AUTO: 68 % (ref 37–73)
NITRITE UR QL STRIP.AUTO: NEGATIVE
NRBC BLD AUTO-RTO: 0 %
PH UR STRIP.AUTO: 6.5 [PH]
PHOSPHATE SERPL-MCNC: 3.2 MG/DL (ref 2.5–4.9)
PLATELET # BLD AUTO: 207 X10(3)/MCL (ref 140–371)
PMV BLD AUTO: 9.6 FL (ref 9.4–12.4)
POTASSIUM SERPL-SCNC: 3.4 MMOL/L (ref 3.5–5.1)
PROT UR QL STRIP.AUTO: 100
PROT UR STRIP-MCNC: 113 MG/DL
PTH-INTACT SERPL-MCNC: 154.2 PG/ML (ref 14–73)
RBC # BLD AUTO: 3.65 X10(6)/MCL (ref 4–6)
RBC #/AREA URNS AUTO: ABNORMAL /HPF
RBC UR QL AUTO: ABNORMAL
SODIUM SERPL-SCNC: 142 MMOL/L (ref 135–145)
SP GR UR STRIP.AUTO: 1.02 (ref 1–1.03)
SQUAMOUS #/AREA URNS AUTO: ABNORMAL /HPF
UROBILINOGEN UR STRIP-ACNC: 0.2
WBC # SPEC AUTO: 6.19 X10(3)/MCL (ref 4–11.5)
WBC #/AREA URNS AUTO: ABNORMAL /HPF

## 2024-01-19 PROCEDURE — 82570 ASSAY OF URINE CREATININE: CPT

## 2024-01-19 PROCEDURE — 81001 URINALYSIS AUTO W/SCOPE: CPT

## 2024-01-19 PROCEDURE — 85025 COMPLETE CBC W/AUTO DIFF WBC: CPT

## 2024-01-19 PROCEDURE — 84156 ASSAY OF PROTEIN URINE: CPT

## 2024-01-19 PROCEDURE — 80069 RENAL FUNCTION PANEL: CPT

## 2024-01-19 PROCEDURE — 83970 ASSAY OF PARATHORMONE: CPT

## 2024-01-19 PROCEDURE — 82043 UR ALBUMIN QUANTITATIVE: CPT

## 2024-01-19 PROCEDURE — 36415 COLL VENOUS BLD VENIPUNCTURE: CPT

## 2024-02-07 ENCOUNTER — OFFICE VISIT (OUTPATIENT)
Dept: CARDIAC SURGERY | Facility: CLINIC | Age: 64
End: 2024-02-07
Payer: MEDICARE

## 2024-02-07 VITALS
BODY MASS INDEX: 35.09 KG/M2 | HEART RATE: 78 BPM | SYSTOLIC BLOOD PRESSURE: 121 MMHG | HEIGHT: 75 IN | WEIGHT: 282.19 LBS | DIASTOLIC BLOOD PRESSURE: 59 MMHG | OXYGEN SATURATION: 97 %

## 2024-02-07 DIAGNOSIS — Z51.81 ADMISSION FOR LONG-TERM (CURRENT) USE OF ANTICOAGULANTS: ICD-10-CM

## 2024-02-07 DIAGNOSIS — Z79.01 ADMISSION FOR LONG-TERM (CURRENT) USE OF ANTICOAGULANTS: ICD-10-CM

## 2024-02-07 DIAGNOSIS — I25.10 CORONARY ARTERY DISEASE: ICD-10-CM

## 2024-02-07 DIAGNOSIS — I25.10 CORONARY ARTERY DISEASE INVOLVING NATIVE CORONARY ARTERY OF NATIVE HEART, UNSPECIFIED WHETHER ANGINA PRESENT: Primary | ICD-10-CM

## 2024-02-07 PROCEDURE — 3074F SYST BP LT 130 MM HG: CPT | Mod: CPTII,,, | Performed by: THORACIC SURGERY (CARDIOTHORACIC VASCULAR SURGERY)

## 2024-02-07 PROCEDURE — 1159F MED LIST DOCD IN RCRD: CPT | Mod: CPTII,,, | Performed by: THORACIC SURGERY (CARDIOTHORACIC VASCULAR SURGERY)

## 2024-02-07 PROCEDURE — 3008F BODY MASS INDEX DOCD: CPT | Mod: CPTII,,, | Performed by: THORACIC SURGERY (CARDIOTHORACIC VASCULAR SURGERY)

## 2024-02-07 PROCEDURE — 3078F DIAST BP <80 MM HG: CPT | Mod: CPTII,,, | Performed by: THORACIC SURGERY (CARDIOTHORACIC VASCULAR SURGERY)

## 2024-02-07 PROCEDURE — 99204 OFFICE O/P NEW MOD 45 MIN: CPT | Mod: ,,, | Performed by: THORACIC SURGERY (CARDIOTHORACIC VASCULAR SURGERY)

## 2024-02-07 PROCEDURE — 1160F RVW MEDS BY RX/DR IN RCRD: CPT | Mod: CPTII,,, | Performed by: THORACIC SURGERY (CARDIOTHORACIC VASCULAR SURGERY)

## 2024-02-07 RX ORDER — HYDROCODONE BITARTRATE AND ACETAMINOPHEN 500; 5 MG/1; MG/1
TABLET ORAL
Status: CANCELLED | OUTPATIENT
Start: 2024-02-07

## 2024-02-07 RX ORDER — MUPIROCIN 20 MG/G
OINTMENT TOPICAL
Status: CANCELLED | OUTPATIENT
Start: 2024-02-07 | End: 2024-02-07

## 2024-02-07 NOTE — H&P (VIEW-ONLY)
History & Physical    SUBJECTIVE:     History of Present Illness:  The patient is presenting for evaluation for severe triple-vessel coronary artery disease.  He has a history of GI bleed.  He also stage 4 chronic kidney disease with current creatinine of 3.4.  His EF is preserved at 60 65%.  Chief Complaint   Patient presents with    Pre-op Exam     REFERRAL-DR. TINAJERO-CABG EVAL/LLAA. DX: SEVERE MV CAD S/P CATH 1/4/24. PMH: GI BLEED/BLOOD TRANSFUSION, CAD, BILAT CA STENOSIS, CLAUDICATION, PAD, STENT ARTERY, SOB, ANGIECTASIA, ANEMIA, HTN, ANGINA, VAPE-NICOTINE USE, EF 60-65%  C/o Leg and chest pain 5/10       Review of patient's allergies indicates:   Allergen Reactions    Allergenic extract-food-shrimp Rash     All seafood      Lactose Rash    Mayonnaise Rash       Current Outpatient Medications   Medication Sig Dispense Refill    amLODIPine (NORVASC) 10 MG tablet Take 10 mg by mouth.      aspirin (ECOTRIN) 81 MG EC tablet Take 81 mg by mouth once daily.      calcitRIOL (ROCALTROL) 0.5 MCG Cap Take 0.5 mcg by mouth 4 (four) times a week.      clopidogreL (PLAVIX) 75 mg tablet Take 75 mg by mouth once daily.      furosemide (LASIX) 20 MG tablet Take 20 mg by mouth once daily. Take 2 tablets by mouth once a day as needed for swelling      glimepiride (AMARYL) 4 MG tablet Take 4 mg by mouth once daily.      KLOR-CON 10 10 mEq TbSR Take 10 mEq by mouth once daily.      metoprolol succinate (TOPROL-XL) 100 MG 24 hr tablet Take 100 mg by mouth once daily.      nitroGLYCERIN (NITROSTAT) 0.4 MG SL tablet Place under the tongue.      olmesartan-hydrochlorothiazide (BENICAR HCT) 40-25 mg per tablet Take 1 tablet by mouth once daily.      ranolazine (RANEXA) 500 MG Tb12 Take 500 mg by mouth 2 (two) times daily.      rosuvastatin (CRESTOR) 40 MG Tab Take 40 mg by mouth every evening.      sildenafiL (VIAGRA) 100 MG tablet Take 100 mg by mouth daily as needed for Erectile Dysfunction.      sodium bicarbonate 650 MG tablet Take  "650 mg by mouth 2 (two) times daily.      acyclovir (ZOVIRAX) 400 MG tablet Take 400 mg by mouth 3 (three) times daily.      ALPRAZolam (XANAX) 1 MG tablet Take 1 mg by mouth 2 (two) times daily.      amitriptyline (ELAVIL) 50 MG tablet Take 50 mg by mouth every evening.      atenoloL (TENORMIN) 50 MG tablet Take 50 mg by mouth once daily.      omeprazole (PRILOSEC) 40 MG capsule Take 40 mg by mouth once daily.       No current facility-administered medications for this visit.     Facility-Administered Medications Ordered in Other Visits   Medication Dose Route Frequency Provider Last Rate Last Admin    aspirin tablet 325 mg  325 mg Oral On Call Procedure Order, Paper   325 mg at 01/04/24 0949    diazePAM tablet 10 mg  10 mg Oral On Call Procedure Order, Paper   10 mg at 01/04/24 0949       Past Medical History:   Diagnosis Date    Abnormal cardiovascular stress test     Acute anemia     Angiectasia     Angina pectoris     Atherosclerosis of native arteries of extremities with intermittent claudication, unspecified extremity     Diabetes mellitus     Heart disease     Hypertension     Peripheral artery disease     SOB (shortness of breath)      Past Surgical History:   Procedure Laterality Date    CORONARY ANGIOGRAPHY Right 01/04/2024    Procedure: ANGIOGRAM, CORONARY ARTERY;  Surgeon: Ruben Bee MD;  Location: [facility] CATH LAB;  Service: Cardiology;  Laterality: Right;    LEFT HEART CATHETERIZATION Right 01/04/2024    Procedure: Left heart cath;  Surgeon: Ruben Bee MD;  Location: [facility] CATH LAB;  Service: Cardiology;  Laterality: Right;  DIAGNOSTIC PERIPHERAL    PLACEMENT-STENT      pt states "in one of my legs"     History reviewed. No pertinent family history.  Social History     Tobacco Use    Smoking status: Former     Current packs/day: 0.00     Types: Cigarettes, Vaping with nicotine     Quit date: 2/1/2021     Years since quitting: 3.0    Smokeless tobacco: Never   Substance Use Topics    Alcohol use: " "Never    Drug use: Never        Review of Systems:  Review of Systems   Constitutional: Negative.    HENT: Negative.     Eyes: Negative.    Respiratory: Negative.     Cardiovascular: Negative.    Gastrointestinal: Negative.    Endocrine: Negative.    Genitourinary: Negative.    Musculoskeletal: Negative.         Claudications   Skin: Negative.    Allergic/Immunologic: Negative.    Neurological: Negative.    Hematological: Negative.    Psychiatric/Behavioral: Negative.         OBJECTIVE:     Vital Signs (Most Recent)  Pulse: 78 (02/07/24 1307)  BP: (!) 121/59 (02/07/24 1307)  SpO2: 97 % (02/07/24 1307)  6' 3" (1.905 m)  128 kg (282 lb 3.2 oz)     Physical Exam:  Physical Exam  Vitals reviewed.   Constitutional:       Appearance: Normal appearance.   HENT:      Head: Normocephalic and atraumatic.      Nose: Nose normal.      Mouth/Throat:      Mouth: Mucous membranes are dry.      Pharynx: Oropharynx is clear.   Eyes:      Extraocular Movements: Extraocular movements intact.      Conjunctiva/sclera: Conjunctivae normal.      Pupils: Pupils are equal, round, and reactive to light.   Cardiovascular:      Rate and Rhythm: Normal rate and regular rhythm.      Pulses: Normal pulses.   Pulmonary:      Effort: Pulmonary effort is normal.      Breath sounds: Normal breath sounds.   Abdominal:      General: Abdomen is flat.      Palpations: Abdomen is soft.   Musculoskeletal:         General: Normal range of motion.      Cervical back: Neck supple.   Skin:     General: Skin is warm and dry.   Neurological:      General: No focal deficit present.   Psychiatric:         Mood and Affect: Mood normal.         Laboratory:  None      Diagnostic Results:  Cardiac catheterization and echocardiogram reviewed.      ASSESSMENT/PLAN:     Severe triple-vessel coronary artery disease.  Preserved ejection fraction.  The patient is definitely benefit from coronary artery bypass grafting.  He understands the risks of bleeding infection " myocardial infarction stroke renal failure and death.  He is at particularly increased risk for dialysis secondary to his CK for disease.  He elected to proceed

## 2024-02-07 NOTE — PROGRESS NOTES
History & Physical    SUBJECTIVE:     History of Present Illness:  The patient is presenting for evaluation for severe triple-vessel coronary artery disease.  He has a history of GI bleed.  He also stage 4 chronic kidney disease with current creatinine of 3.4.  His EF is preserved at 60 65%.  Chief Complaint   Patient presents with    Pre-op Exam     REFERRAL-DR. TINAJERO-CABG EVAL/LLAA. DX: SEVERE MV CAD S/P CATH 1/4/24. PMH: GI BLEED/BLOOD TRANSFUSION, CAD, BILAT CA STENOSIS, CLAUDICATION, PAD, STENT ARTERY, SOB, ANGIECTASIA, ANEMIA, HTN, ANGINA, VAPE-NICOTINE USE, EF 60-65%  C/o Leg and chest pain 5/10       Review of patient's allergies indicates:   Allergen Reactions    Allergenic extract-food-shrimp Rash     All seafood      Lactose Rash    Mayonnaise Rash       Current Outpatient Medications   Medication Sig Dispense Refill    amLODIPine (NORVASC) 10 MG tablet Take 10 mg by mouth.      aspirin (ECOTRIN) 81 MG EC tablet Take 81 mg by mouth once daily.      calcitRIOL (ROCALTROL) 0.5 MCG Cap Take 0.5 mcg by mouth 4 (four) times a week.      clopidogreL (PLAVIX) 75 mg tablet Take 75 mg by mouth once daily.      furosemide (LASIX) 20 MG tablet Take 20 mg by mouth once daily. Take 2 tablets by mouth once a day as needed for swelling      glimepiride (AMARYL) 4 MG tablet Take 4 mg by mouth once daily.      KLOR-CON 10 10 mEq TbSR Take 10 mEq by mouth once daily.      metoprolol succinate (TOPROL-XL) 100 MG 24 hr tablet Take 100 mg by mouth once daily.      nitroGLYCERIN (NITROSTAT) 0.4 MG SL tablet Place under the tongue.      olmesartan-hydrochlorothiazide (BENICAR HCT) 40-25 mg per tablet Take 1 tablet by mouth once daily.      ranolazine (RANEXA) 500 MG Tb12 Take 500 mg by mouth 2 (two) times daily.      rosuvastatin (CRESTOR) 40 MG Tab Take 40 mg by mouth every evening.      sildenafiL (VIAGRA) 100 MG tablet Take 100 mg by mouth daily as needed for Erectile Dysfunction.      sodium bicarbonate 650 MG tablet Take  "650 mg by mouth 2 (two) times daily.      acyclovir (ZOVIRAX) 400 MG tablet Take 400 mg by mouth 3 (three) times daily.      ALPRAZolam (XANAX) 1 MG tablet Take 1 mg by mouth 2 (two) times daily.      amitriptyline (ELAVIL) 50 MG tablet Take 50 mg by mouth every evening.      atenoloL (TENORMIN) 50 MG tablet Take 50 mg by mouth once daily.      omeprazole (PRILOSEC) 40 MG capsule Take 40 mg by mouth once daily.       No current facility-administered medications for this visit.     Facility-Administered Medications Ordered in Other Visits   Medication Dose Route Frequency Provider Last Rate Last Admin    aspirin tablet 325 mg  325 mg Oral On Call Procedure Order, Paper   325 mg at 01/04/24 0949    diazePAM tablet 10 mg  10 mg Oral On Call Procedure Order, Paper   10 mg at 01/04/24 0949       Past Medical History:   Diagnosis Date    Abnormal cardiovascular stress test     Acute anemia     Angiectasia     Angina pectoris     Atherosclerosis of native arteries of extremities with intermittent claudication, unspecified extremity     Diabetes mellitus     Heart disease     Hypertension     Peripheral artery disease     SOB (shortness of breath)      Past Surgical History:   Procedure Laterality Date    CORONARY ANGIOGRAPHY Right 01/04/2024    Procedure: ANGIOGRAM, CORONARY ARTERY;  Surgeon: Ruben Bee MD;  Location: [facility] CATH LAB;  Service: Cardiology;  Laterality: Right;    LEFT HEART CATHETERIZATION Right 01/04/2024    Procedure: Left heart cath;  Surgeon: Ruben Bee MD;  Location: [facility] CATH LAB;  Service: Cardiology;  Laterality: Right;  DIAGNOSTIC PERIPHERAL    PLACEMENT-STENT      pt states "in one of my legs"     History reviewed. No pertinent family history.  Social History     Tobacco Use    Smoking status: Former     Current packs/day: 0.00     Types: Cigarettes, Vaping with nicotine     Quit date: 2/1/2021     Years since quitting: 3.0    Smokeless tobacco: Never   Substance Use Topics    Alcohol use: " "Never    Drug use: Never        Review of Systems:  Review of Systems   Constitutional: Negative.    HENT: Negative.     Eyes: Negative.    Respiratory: Negative.     Cardiovascular: Negative.    Gastrointestinal: Negative.    Endocrine: Negative.    Genitourinary: Negative.    Musculoskeletal: Negative.         Claudications   Skin: Negative.    Allergic/Immunologic: Negative.    Neurological: Negative.    Hematological: Negative.    Psychiatric/Behavioral: Negative.         OBJECTIVE:     Vital Signs (Most Recent)  Pulse: 78 (02/07/24 1307)  BP: (!) 121/59 (02/07/24 1307)  SpO2: 97 % (02/07/24 1307)  6' 3" (1.905 m)  128 kg (282 lb 3.2 oz)     Physical Exam:  Physical Exam  Vitals reviewed.   Constitutional:       Appearance: Normal appearance.   HENT:      Head: Normocephalic and atraumatic.      Nose: Nose normal.      Mouth/Throat:      Mouth: Mucous membranes are dry.      Pharynx: Oropharynx is clear.   Eyes:      Extraocular Movements: Extraocular movements intact.      Conjunctiva/sclera: Conjunctivae normal.      Pupils: Pupils are equal, round, and reactive to light.   Cardiovascular:      Rate and Rhythm: Normal rate and regular rhythm.      Pulses: Normal pulses.   Pulmonary:      Effort: Pulmonary effort is normal.      Breath sounds: Normal breath sounds.   Abdominal:      General: Abdomen is flat.      Palpations: Abdomen is soft.   Musculoskeletal:         General: Normal range of motion.      Cervical back: Neck supple.   Skin:     General: Skin is warm and dry.   Neurological:      General: No focal deficit present.   Psychiatric:         Mood and Affect: Mood normal.         Laboratory:  None      Diagnostic Results:  Cardiac catheterization and echocardiogram reviewed.      ASSESSMENT/PLAN:     Severe triple-vessel coronary artery disease.  Preserved ejection fraction.  The patient is definitely benefit from coronary artery bypass grafting.  He understands the risks of bleeding infection " myocardial infarction stroke renal failure and death.  He is at particularly increased risk for dialysis secondary to his CK for disease.  He elected to proceed

## 2024-02-19 ENCOUNTER — ANESTHESIA EVENT (OUTPATIENT)
Dept: SURGERY | Facility: HOSPITAL | Age: 64
DRG: 236 | End: 2024-02-19
Payer: MEDICARE

## 2024-02-19 ENCOUNTER — HOSPITAL ENCOUNTER (OUTPATIENT)
Dept: RADIOLOGY | Facility: HOSPITAL | Age: 64
Discharge: HOME | DRG: 236 | End: 2024-02-19
Attending: THORACIC SURGERY (CARDIOTHORACIC VASCULAR SURGERY)
Payer: MEDICARE

## 2024-02-19 DIAGNOSIS — I25.10 CORONARY ARTERY DISEASE INVOLVING NATIVE CORONARY ARTERY OF NATIVE HEART, UNSPECIFIED WHETHER ANGINA PRESENT: ICD-10-CM

## 2024-02-19 PROCEDURE — 71046 X-RAY EXAM CHEST 2 VIEWS: CPT | Mod: TC

## 2024-02-19 NOTE — PROGRESS NOTES
Messaged Mehnaz MENDEZ with Dr. Bauman and made aware of glucose of 343.   1504 Mehnaz MENDEZ notified PA group and instructed pt on meds and diet for the week.

## 2024-02-21 NOTE — PRE-PROCEDURE INSTRUCTIONS
"[facility]: Outpatient Surgery  Preprocedure Check-In Instructions     Your arrival time for your surgery or procedure is ___your surgeon will tell you what time to arrive ___.  We ask patients to arrive about 2 hours before surgery to allow for enough time to review your health history & medications, start your IV, complete any outstanding labwork or tests, and meet your Anesthesiologist.    Expectations: "Because of inconsistent procedure completion times, an unexpected wait may occur. The Physicians would like you to be here to prepare in the event they run ahead of time. We will make you as comfortable as possible and keep you informed. We apologize in advance if this happens."    You will arrive at [facility], [address], [place].  Enter through the [place] entrance next to the Emergency Room, and come to the 6th floor to the Outpatient Surgery Department.     Visitory Policy:  You are allowed 2 adult visitors to be with you in the hospital. All hospital visitors should be in good current health.  No small children.     What to Bring:  Please have your ID, insurance cards, and all home medication bottles with you at check in.  Bring your CPAP machine if one is used at home.     Fasting:  Nothing to eat or drink after midnight the night before your procedure. This includes no ice, gum, hard candies, and/or tobacco products.  Follow your doctor's instructions for taking any medications on the morning of your procedure.  If no instructions for taking medications were given, do not take any medications but bring your medications in their bottles to your procedure check in.     Follow your doctor's preoperative instructions regarding skin prep, bowel prep, bathing, or showering prior to your procedure.  If any special soaps were provided to you, please use according to your doctor's instructions. If no instructions were given from your doctor, take a good bath or " shower with antibacterial soap the night before and the morning of your procedure.  On the morning of procedure, wear loose, comfortable clothing.  No lotions, makeup, perfumes, colognes, deodorant, or jewelry to your procedure.  Removable items (glasses, contact lenses, dentures, retainers, hearing aids) need to be removed for your procedure.  Bring your storage containers for these items if you wear them.     Artificial nails, body jewelry, eyelash extensions, and/or hair extensions with metal clips are not allowed during your surgery.  If you currently wear any of these items, please arrange for them to be removed prior to your arrival to the hospital.     Outpatient or Same Day Surgeries:  Any patients receiving sedation/anesthesia are advised not to drive for 24 hours after their procedure.  We do not allow patients to drive themselves home after discharge.  If you are going home after your procedure, please have someone available to drive you home from the hospital.        You may call the Outpatient Surgery Department at [phone] with any questions or concerns.  We are looking forward to meeting you and taking great care of you for your procedure.  Thank you for choosing [facility] for your surgical needs.

## 2024-02-22 ENCOUNTER — HOSPITAL ENCOUNTER (INPATIENT)
Facility: HOSPITAL | Age: 64
LOS: 8 days | Discharge: HOME HEALTH | DRG: 236 | End: 2024-03-01
Attending: THORACIC SURGERY (CARDIOTHORACIC VASCULAR SURGERY) | Admitting: INTERNAL MEDICINE
Payer: MEDICARE

## 2024-02-22 ENCOUNTER — ANESTHESIA (OUTPATIENT)
Dept: SURGERY | Facility: HOSPITAL | Age: 64
DRG: 236 | End: 2024-02-22
Payer: MEDICARE

## 2024-02-22 DIAGNOSIS — R60.9 EDEMA: ICD-10-CM

## 2024-02-22 DIAGNOSIS — I49.9 ABNORMAL HEART RHYTHM: ICD-10-CM

## 2024-02-22 DIAGNOSIS — N18.4 STAGE 4 CHRONIC KIDNEY DISEASE: Primary | ICD-10-CM

## 2024-02-22 DIAGNOSIS — I25.10 CORONARY ARTERY DISEASE INVOLVING NATIVE CORONARY ARTERY OF NATIVE HEART, UNSPECIFIED WHETHER ANGINA PRESENT: ICD-10-CM

## 2024-02-22 DIAGNOSIS — I25.10 CAD (CORONARY ARTERY DISEASE): ICD-10-CM

## 2024-02-22 DIAGNOSIS — I25.10 CORONARY ARTERY DISEASE, UNSPECIFIED VESSEL OR LESION TYPE, UNSPECIFIED WHETHER ANGINA PRESENT, UNSPECIFIED WHETHER NATIVE OR TRANSPLANTED HEART: ICD-10-CM

## 2024-02-22 LAB
ABO + RH BLD: NORMAL
ALLENS TEST BLOOD GAS (OHS): ABNORMAL
ANION GAP SERPL CALC-SCNC: 6 MEQ/L
APTT PPP: 29.2 SECONDS (ref 23.2–33.7)
BASE EXCESS BLD CALC-SCNC: -1.4 MMOL/L (ref -2–2)
BASE EXCESS BLD CALC-SCNC: -3.1 MMOL/L (ref -2–2)
BASE EXCESS BLD CALC-SCNC: -3.7 MMOL/L
BASOPHILS # BLD AUTO: 0.03 X10(3)/MCL
BASOPHILS NFR BLD AUTO: 0.3 %
BLD PROD TYP BPU: NORMAL
BLOOD GAS SAMPLE TYPE (OHS): ABNORMAL
BLOOD UNIT EXPIRATION DATE: NORMAL
BLOOD UNIT TYPE CODE: 5100
BUN SERPL-MCNC: 19.4 MG/DL (ref 8.4–25.7)
CA-I BLD-SCNC: 1.18 MMOL/L (ref 1.12–1.23)
CA-I BLD-SCNC: 1.21 MMOL/L (ref 1.12–1.23)
CA-I BLD-SCNC: 1.21 MMOL/L (ref 1.12–1.23)
CALCIUM SERPL-MCNC: 9.7 MG/DL (ref 8.8–10)
CHLORIDE SERPL-SCNC: 112 MMOL/L (ref 98–107)
CO2 BLDA-SCNC: 23.4 MMOL/L
CO2 BLDA-SCNC: 24 MMOL/L
CO2 BLDA-SCNC: 25.7 MMOL/L
CO2 SERPL-SCNC: 24 MMOL/L (ref 23–31)
COHGB MFR BLDA: 1.4 % (ref 0.5–1.5)
COHGB MFR BLDA: 1.7 % (ref 0.5–1.5)
CREAT SERPL-MCNC: 2.69 MG/DL (ref 0.73–1.18)
CREAT/UREA NIT SERPL: 7
CROSSMATCH INTERPRETATION: NORMAL
DISPENSE STATUS: NORMAL
DRAWN BY BLOOD GAS (OHS): ABNORMAL
EOSINOPHIL # BLD AUTO: 0.23 X10(3)/MCL (ref 0–0.9)
EOSINOPHIL NFR BLD AUTO: 2.1 %
ERYTHROCYTE [DISTWIDTH] IN BLOOD BY AUTOMATED COUNT: 14.2 % (ref 11.5–17)
FIO2: 100
FIO2: 70
FIO2: 90
FIO2: 99
GFR SERPLBLD CREATININE-BSD FMLA CKD-EPI: 26 MLS/MIN/1.73/M2
GLUCOSE SERPL-MCNC: 151 MG/DL (ref 70–110)
GLUCOSE SERPL-MCNC: 170 MG/DL (ref 70–110)
GLUCOSE SERPL-MCNC: 172 MG/DL (ref 70–110)
GLUCOSE SERPL-MCNC: 172 MG/DL (ref 82–115)
GLUCOSE SERPL-MCNC: 182 MG/DL (ref 70–110)
GLUCOSE SERPL-MCNC: 185 MG/DL (ref 70–110)
GLUCOSE SERPL-MCNC: 189 MG/DL (ref 70–110)
GLUCOSE SERPL-MCNC: 190 MG/DL (ref 70–110)
GLUCOSE SERPL-MCNC: 192 MG/DL (ref 70–110)
HCO3 BLDA-SCNC: 22.1 MMOL/L (ref 22–26)
HCO3 BLDA-SCNC: 22.7 MMOL/L (ref 22–26)
HCO3 BLDA-SCNC: 24.3 MMOL/L (ref 22–26)
HCO3 UR-SCNC: 21.8 MMOL/L (ref 24–28)
HCO3 UR-SCNC: 23 MMOL/L (ref 24–28)
HCO3 UR-SCNC: 23.9 MMOL/L (ref 24–28)
HCO3 UR-SCNC: 24 MMOL/L (ref 24–28)
HCO3 UR-SCNC: 24.1 MMOL/L (ref 24–28)
HCO3 UR-SCNC: 24.4 MMOL/L (ref 24–28)
HCO3 UR-SCNC: 24.5 MMOL/L (ref 24–28)
HCO3 UR-SCNC: 24.8 MMOL/L (ref 24–28)
HCT VFR BLD AUTO: 30.5 % (ref 42–52)
HCT VFR BLD AUTO: 30.9 % (ref 42–52)
HCT VFR BLD CALC: 22 %PCV (ref 36–54)
HCT VFR BLD CALC: 24 %PCV (ref 36–54)
HCT VFR BLD CALC: 25 %PCV (ref 36–54)
HCT VFR BLD CALC: 25 %PCV (ref 36–54)
HCT VFR BLD CALC: 26 %PCV (ref 36–54)
HCT VFR BLD CALC: 28 %PCV (ref 36–54)
HCT VFR BLD CALC: 29 %PCV (ref 36–54)
HCT VFR BLD CALC: 29 %PCV (ref 36–54)
HGB BLD-MCNC: 10 G/DL
HGB BLD-MCNC: 8 G/DL
HGB BLD-MCNC: 8 G/DL
HGB BLD-MCNC: 9 G/DL
HGB BLD-MCNC: 9.4 G/DL (ref 14–18)
HGB BLD-MCNC: 9.5 G/DL (ref 14–18)
IMM GRANULOCYTES # BLD AUTO: 0.04 X10(3)/MCL (ref 0–0.04)
IMM GRANULOCYTES NFR BLD AUTO: 0.4 %
INHALED O2 CONCENTRATION: 35 %
INHALED O2 CONCENTRATION: 60 %
INR PPP: 1.5
LPM (OHS): 2
LYMPHOCYTES # BLD AUTO: 1.96 X10(3)/MCL (ref 0.6–4.6)
LYMPHOCYTES NFR BLD AUTO: 18.3 %
MAGNESIUM SERPL-MCNC: 3 MG/DL (ref 1.6–2.6)
MCH RBC QN AUTO: 28.7 PG (ref 27–31)
MCHC RBC AUTO-ENTMCNC: 30.8 G/DL (ref 33–36)
MCV RBC AUTO: 93.3 FL (ref 80–94)
MECH RR (OHS): 20 B/MIN
METHGB MFR BLDA: 1 % (ref 0.4–1.5)
METHGB MFR BLDA: 1.5 % (ref 0.4–1.5)
MODE (OHS): ABNORMAL
MODE (OHS): AC
MONOCYTES # BLD AUTO: 0.42 X10(3)/MCL (ref 0.1–1.3)
MONOCYTES NFR BLD AUTO: 3.9 %
NEUTROPHILS # BLD AUTO: 8.05 X10(3)/MCL (ref 2.1–9.2)
NEUTROPHILS NFR BLD AUTO: 75 %
NRBC BLD AUTO-RTO: 0 %
O2 HB BLOOD GAS (OHS): 95.6 % (ref 94–97)
O2 HB BLOOD GAS (OHS): 97 % (ref 94–97)
OXYGEN DEVICE BLOOD GAS (OHS): ABNORMAL
OXYHGB MFR BLDA: 10.1 G/DL (ref 12–16)
OXYHGB MFR BLDA: 9.2 G/DL (ref 12–16)
PCO2 BLDA: 37.4 MMHG (ref 35–45)
PCO2 BLDA: 38.4 MMHG (ref 35–45)
PCO2 BLDA: 41 MMHG (ref 35–45)
PCO2 BLDA: 42 MMHG (ref 35–45)
PCO2 BLDA: 43 MMHG (ref 35–45)
PCO2 BLDA: 44 MMHG (ref 35–45)
PCO2 BLDA: 46.3 MMHG (ref 35–45)
PCO2 BLDA: 48.1 MMHG (ref 35–45)
PCO2 BLDA: 55 MMHG (ref 35–45)
PCO2 BLDA: 58.6 MMHG (ref 35–45)
PCO2 BLDA: 63.9 MMHG (ref 35–45)
PEEP RESPIRATORY: 5 CMH2O
PEEP RESPIRATORY: 5 CMH2O
PH BLDA: 7.33 [PH] (ref 7.35–7.45)
PH BLDA: 7.33 [PH] (ref 7.35–7.45)
PH BLDA: 7.35 [PH] (ref 7.35–7.45)
PH SMN: 7.2 [PH] (ref 7.35–7.45)
PH SMN: 7.23 [PH] (ref 7.35–7.45)
PH SMN: 7.25 [PH] (ref 7.35–7.45)
PH SMN: 7.3 [PH] (ref 7.35–7.45)
PH SMN: 7.31 [PH] (ref 7.35–7.45)
PH SMN: 7.36 [PH] (ref 7.35–7.45)
PH SMN: 7.38 [PH] (ref 7.35–7.45)
PH SMN: 7.41 [PH] (ref 7.35–7.45)
PHOSPHATE SERPL-MCNC: 2.7 MG/DL (ref 2.3–4.7)
PISA TR MAX VEL: 1.98 M/S
PLATELET # BLD AUTO: 125 X10(3)/MCL (ref 130–400)
PMV BLD AUTO: 9.7 FL (ref 7.4–10.4)
PO2 BLDA: 161 MMHG (ref 80–100)
PO2 BLDA: 226 MMHG (ref 40–60)
PO2 BLDA: 233 MMHG (ref 80–100)
PO2 BLDA: 341 MMHG (ref 80–100)
PO2 BLDA: 346 MMHG (ref 80–100)
PO2 BLDA: 387 MMHG (ref 80–100)
PO2 BLDA: 45 MMHG (ref 40–60)
PO2 BLDA: 47 MMHG (ref 40–60)
PO2 BLDA: 70 MMHG (ref 40–60)
PO2 BLDA: 84 MMHG (ref 80–100)
PO2 BLDA: 91 MMHG (ref 80–100)
POC BE: -1 MMOL/L
POC BE: -1 MMOL/L
POC BE: -2 MMOL/L
POC BE: -3 MMOL/L
POC BE: -4 MMOL/L
POC IONIZED CALCIUM: 1.03 MMOL/L (ref 1.06–1.42)
POC IONIZED CALCIUM: 1.05 MMOL/L (ref 1.06–1.42)
POC IONIZED CALCIUM: 1.14 MMOL/L (ref 1.06–1.42)
POC IONIZED CALCIUM: 1.24 MMOL/L (ref 1.06–1.42)
POC IONIZED CALCIUM: 1.26 MMOL/L (ref 1.06–1.42)
POC IONIZED CALCIUM: 1.28 MMOL/L (ref 1.06–1.42)
POC IONIZED CALCIUM: 1.34 MMOL/L (ref 1.06–1.42)
POC IONIZED CALCIUM: 1.47 MMOL/L (ref 1.06–1.42)
POC PCO2 TEMP: 55 MMHG
POC PH TEMP: 7.25
POC PO2 TEMP: 233 MMHG
POC SATURATED O2: 100 % (ref 95–100)
POC SATURATED O2: 69 % (ref 95–100)
POC SATURATED O2: 78 % (ref 95–100)
POC SATURATED O2: 90 % (ref 95–100)
POC TCO2: 23 MMOL/L (ref 23–27)
POC TCO2: 24 MMOL/L (ref 24–29)
POC TCO2: 25 MMOL/L (ref 23–27)
POC TCO2: 25 MMOL/L (ref 23–27)
POC TCO2: 26 MMOL/L (ref 23–27)
POC TCO2: 26 MMOL/L (ref 24–29)
POC TCO2: 26 MMOL/L (ref 24–29)
POC TCO2: 27 MMOL/L (ref 24–29)
POC TEMPERATURE: ABNORMAL
POCT GLUCOSE: 138 MG/DL (ref 70–110)
POCT GLUCOSE: 142 MG/DL (ref 70–110)
POCT GLUCOSE: 143 MG/DL (ref 70–110)
POCT GLUCOSE: 146 MG/DL (ref 70–110)
POCT GLUCOSE: 147 MG/DL (ref 70–110)
POCT GLUCOSE: 149 MG/DL (ref 70–110)
POCT GLUCOSE: 154 MG/DL (ref 70–110)
POCT GLUCOSE: 154 MG/DL (ref 70–110)
POCT GLUCOSE: 156 MG/DL (ref 70–110)
POCT GLUCOSE: 161 MG/DL (ref 70–110)
POCT GLUCOSE: 169 MG/DL (ref 70–110)
POCT GLUCOSE: 170 MG/DL (ref 70–110)
POCT GLUCOSE: 181 MG/DL (ref 70–110)
POCT GLUCOSE: 185 MG/DL (ref 70–110)
POTASSIUM BLD-SCNC: 3.2 MMOL/L (ref 3.5–5.1)
POTASSIUM BLD-SCNC: 3.4 MMOL/L (ref 3.5–5.1)
POTASSIUM BLD-SCNC: 3.4 MMOL/L (ref 3.5–5.1)
POTASSIUM BLD-SCNC: 3.8 MMOL/L (ref 3.5–5.1)
POTASSIUM BLD-SCNC: 4 MMOL/L (ref 3.5–5.1)
POTASSIUM BLD-SCNC: 4.1 MMOL/L (ref 3.5–5.1)
POTASSIUM BLD-SCNC: 4.3 MMOL/L (ref 3.5–5.1)
POTASSIUM BLD-SCNC: 4.8 MMOL/L (ref 3.5–5.1)
POTASSIUM BLOOD GAS (OHS): 3.4 MMOL/L (ref 3.5–5)
POTASSIUM BLOOD GAS (OHS): 3.4 MMOL/L (ref 3.5–5)
POTASSIUM BLOOD GAS (OHS): 3.8 MMOL/L (ref 3.5–5)
POTASSIUM SERPL-SCNC: 3.4 MMOL/L (ref 3.5–5.1)
POTASSIUM SERPL-SCNC: 3.8 MMOL/L (ref 3.5–5.1)
POTASSIUM SERPL-SCNC: 4 MMOL/L (ref 3.5–5.1)
PROTHROMBIN TIME: 18.1 SECONDS (ref 12.5–14.5)
PS (OHS): 10 CMH2O
PS (OHS): 10 CMH2O
RBC # BLD AUTO: 3.27 X10(6)/MCL (ref 4.7–6.1)
SAMPLE SITE BLOOD GAS (OHS): ABNORMAL
SAMPLE: ABNORMAL
SAO2 % BLDA: 95 %
SAO2 % BLDA: 96.6 %
SAO2 % BLDA: 99.3 %
SODIUM BLD-SCNC: 140 MMOL/L (ref 136–145)
SODIUM BLD-SCNC: 140 MMOL/L (ref 136–145)
SODIUM BLD-SCNC: 141 MMOL/L (ref 136–145)
SODIUM BLD-SCNC: 142 MMOL/L (ref 136–145)
SODIUM BLD-SCNC: 142 MMOL/L (ref 136–145)
SODIUM BLD-SCNC: 143 MMOL/L (ref 136–145)
SODIUM BLOOD GAS (OHS): 139 MMOL/L (ref 137–145)
SODIUM BLOOD GAS (OHS): 141 MMOL/L (ref 137–145)
SODIUM BLOOD GAS (OHS): 141 MMOL/L (ref 137–145)
SODIUM SERPL-SCNC: 142 MMOL/L (ref 136–145)
SPONT+MECH VT ON VENT: 500 ML
TR MAX PG: 16 MMHG
UNIT NUMBER: NORMAL
WBC # SPEC AUTO: 10.73 X10(3)/MCL (ref 4.5–11.5)

## 2024-02-22 PROCEDURE — C1887 CATHETER, GUIDING: HCPCS | Performed by: THORACIC SURGERY (CARDIOTHORACIC VASCULAR SURGERY)

## 2024-02-22 PROCEDURE — 33518 CABG ARTERY-VEIN TWO: CPT | Mod: AS,,, | Performed by: PHYSICIAN ASSISTANT

## 2024-02-22 PROCEDURE — 021109W BYPASS CORONARY ARTERY, TWO ARTERIES FROM AORTA WITH AUTOLOGOUS VENOUS TISSUE, OPEN APPROACH: ICD-10-PCS | Performed by: THORACIC SURGERY (CARDIOTHORACIC VASCULAR SURGERY)

## 2024-02-22 PROCEDURE — 02100Z9 BYPASS CORONARY ARTERY, ONE ARTERY FROM LEFT INTERNAL MAMMARY, OPEN APPROACH: ICD-10-PCS | Performed by: THORACIC SURGERY (CARDIOTHORACIC VASCULAR SURGERY)

## 2024-02-22 PROCEDURE — 33508 ENDOSCOPIC VEIN HARVEST: CPT | Mod: AS,59,, | Performed by: PHYSICIAN ASSISTANT

## 2024-02-22 PROCEDURE — 25000003 PHARM REV CODE 250

## 2024-02-22 PROCEDURE — 63600175 PHARM REV CODE 636 W HCPCS

## 2024-02-22 PROCEDURE — 25000003 PHARM REV CODE 250: Performed by: NURSE ANESTHETIST, CERTIFIED REGISTERED

## 2024-02-22 PROCEDURE — 99900035 HC TECH TIME PER 15 MIN (STAT)

## 2024-02-22 PROCEDURE — 85025 COMPLETE CBC W/AUTO DIFF WBC: CPT | Performed by: THORACIC SURGERY (CARDIOTHORACIC VASCULAR SURGERY)

## 2024-02-22 PROCEDURE — 25000003 PHARM REV CODE 250: Performed by: PHYSICIAN ASSISTANT

## 2024-02-22 PROCEDURE — 84132 ASSAY OF SERUM POTASSIUM: CPT | Performed by: THORACIC SURGERY (CARDIOTHORACIC VASCULAR SURGERY)

## 2024-02-22 PROCEDURE — 33572 OPEN CORONARY ENDARTERECTOMY: CPT | Mod: ,,, | Performed by: THORACIC SURGERY (CARDIOTHORACIC VASCULAR SURGERY)

## 2024-02-22 PROCEDURE — 33533 CABG ARTERIAL SINGLE: CPT | Mod: AS,,, | Performed by: PHYSICIAN ASSISTANT

## 2024-02-22 PROCEDURE — 80048 BASIC METABOLIC PNL TOTAL CA: CPT | Performed by: THORACIC SURGERY (CARDIOTHORACIC VASCULAR SURGERY)

## 2024-02-22 PROCEDURE — 88304 TISSUE EXAM BY PATHOLOGIST: CPT | Performed by: THORACIC SURGERY (CARDIOTHORACIC VASCULAR SURGERY)

## 2024-02-22 PROCEDURE — D9220A PRA ANESTHESIA: Mod: ANES,,, | Performed by: ANESTHESIOLOGY

## 2024-02-22 PROCEDURE — 93320 DOPPLER ECHO COMPLETE: CPT | Mod: 26,59,, | Performed by: ANESTHESIOLOGY

## 2024-02-22 PROCEDURE — P9045 ALBUMIN (HUMAN), 5%, 250 ML: HCPCS | Mod: JZ,JG

## 2024-02-22 PROCEDURE — D9220A PRA ANESTHESIA: Mod: CRNA,,, | Performed by: NURSE ANESTHETIST, CERTIFIED REGISTERED

## 2024-02-22 PROCEDURE — P9016 RBC LEUKOCYTES REDUCED: HCPCS | Performed by: THORACIC SURGERY (CARDIOTHORACIC VASCULAR SURGERY)

## 2024-02-22 PROCEDURE — 63600175 PHARM REV CODE 636 W HCPCS: Mod: JG

## 2024-02-22 PROCEDURE — 63600175 PHARM REV CODE 636 W HCPCS: Performed by: THORACIC SURGERY (CARDIOTHORACIC VASCULAR SURGERY)

## 2024-02-22 PROCEDURE — P9045 ALBUMIN (HUMAN), 5%, 250 ML: HCPCS | Mod: JZ,JG | Performed by: NURSE ANESTHETIST, CERTIFIED REGISTERED

## 2024-02-22 PROCEDURE — 33572 OPEN CORONARY ENDARTERECTOMY: CPT | Mod: AS,,, | Performed by: PHYSICIAN ASSISTANT

## 2024-02-22 PROCEDURE — 84100 ASSAY OF PHOSPHORUS: CPT | Performed by: THORACIC SURGERY (CARDIOTHORACIC VASCULAR SURGERY)

## 2024-02-22 PROCEDURE — 30233N1 TRANSFUSION OF NONAUTOLOGOUS RED BLOOD CELLS INTO PERIPHERAL VEIN, PERCUTANEOUS APPROACH: ICD-10-PCS | Performed by: THORACIC SURGERY (CARDIOTHORACIC VASCULAR SURGERY)

## 2024-02-22 PROCEDURE — 33533 CABG ARTERIAL SINGLE: CPT | Mod: ,,, | Performed by: THORACIC SURGERY (CARDIOTHORACIC VASCULAR SURGERY)

## 2024-02-22 PROCEDURE — 36556 INSERT NON-TUNNEL CV CATH: CPT | Mod: 59,,, | Performed by: ANESTHESIOLOGY

## 2024-02-22 PROCEDURE — 20000000 HC ICU ROOM

## 2024-02-22 PROCEDURE — 06BQ4ZZ EXCISION OF LEFT SAPHENOUS VEIN, PERCUTANEOUS ENDOSCOPIC APPROACH: ICD-10-PCS | Performed by: THORACIC SURGERY (CARDIOTHORACIC VASCULAR SURGERY)

## 2024-02-22 PROCEDURE — 37799 UNLISTED PX VASCULAR SURGERY: CPT

## 2024-02-22 PROCEDURE — 94002 VENT MGMT INPAT INIT DAY: CPT

## 2024-02-22 PROCEDURE — 37000009 HC ANESTHESIA EA ADD 15 MINS: Performed by: THORACIC SURGERY (CARDIOTHORACIC VASCULAR SURGERY)

## 2024-02-22 PROCEDURE — 99900031 HC PATIENT EDUCATION (STAT)

## 2024-02-22 PROCEDURE — P9047 ALBUMIN (HUMAN), 25%, 50ML: HCPCS | Mod: JG

## 2024-02-22 PROCEDURE — 85610 PROTHROMBIN TIME: CPT | Performed by: THORACIC SURGERY (CARDIOTHORACIC VASCULAR SURGERY)

## 2024-02-22 PROCEDURE — 76937 US GUIDE VASCULAR ACCESS: CPT | Mod: 26,,, | Performed by: ANESTHESIOLOGY

## 2024-02-22 PROCEDURE — 85018 HEMOGLOBIN: CPT | Performed by: THORACIC SURGERY (CARDIOTHORACIC VASCULAR SURGERY)

## 2024-02-22 PROCEDURE — 33508 ENDOSCOPIC VEIN HARVEST: CPT | Mod: 59,,, | Performed by: THORACIC SURGERY (CARDIOTHORACIC VASCULAR SURGERY)

## 2024-02-22 PROCEDURE — 25000003 PHARM REV CODE 250: Performed by: THORACIC SURGERY (CARDIOTHORACIC VASCULAR SURGERY)

## 2024-02-22 PROCEDURE — 85730 THROMBOPLASTIN TIME PARTIAL: CPT | Performed by: THORACIC SURGERY (CARDIOTHORACIC VASCULAR SURGERY)

## 2024-02-22 PROCEDURE — 94761 N-INVAS EAR/PLS OXIMETRY MLT: CPT | Mod: XB

## 2024-02-22 PROCEDURE — 83735 ASSAY OF MAGNESIUM: CPT | Performed by: THORACIC SURGERY (CARDIOTHORACIC VASCULAR SURGERY)

## 2024-02-22 PROCEDURE — 93312 ECHO TRANSESOPHAGEAL: CPT | Mod: 26,59,, | Performed by: ANESTHESIOLOGY

## 2024-02-22 PROCEDURE — 33518 CABG ARTERY-VEIN TWO: CPT | Mod: ,,, | Performed by: THORACIC SURGERY (CARDIOTHORACIC VASCULAR SURGERY)

## 2024-02-22 PROCEDURE — 63600175 PHARM REV CODE 636 W HCPCS: Performed by: NURSE ANESTHETIST, CERTIFIED REGISTERED

## 2024-02-22 PROCEDURE — A4216 STERILE WATER/SALINE, 10 ML: HCPCS | Performed by: NURSE ANESTHETIST, CERTIFIED REGISTERED

## 2024-02-22 PROCEDURE — 5A1221Z PERFORMANCE OF CARDIAC OUTPUT, CONTINUOUS: ICD-10-PCS | Performed by: THORACIC SURGERY (CARDIOTHORACIC VASCULAR SURGERY)

## 2024-02-22 PROCEDURE — 36000712 HC OR TIME LEV V 1ST 15 MIN: Performed by: THORACIC SURGERY (CARDIOTHORACIC VASCULAR SURGERY)

## 2024-02-22 PROCEDURE — C1894 INTRO/SHEATH, NON-LASER: HCPCS | Performed by: THORACIC SURGERY (CARDIOTHORACIC VASCULAR SURGERY)

## 2024-02-22 PROCEDURE — P9045 ALBUMIN (HUMAN), 5%, 250 ML: HCPCS | Mod: JG

## 2024-02-22 PROCEDURE — 27100171 HC OXYGEN HIGH FLOW UP TO 24 HOURS

## 2024-02-22 PROCEDURE — 82803 BLOOD GASES ANY COMBINATION: CPT

## 2024-02-22 PROCEDURE — 93325 DOPPLER ECHO COLOR FLOW MAPG: CPT | Mod: 26,59,, | Performed by: ANESTHESIOLOGY

## 2024-02-22 PROCEDURE — C1751 CATH, INF, PER/CENT/MIDLINE: HCPCS | Performed by: THORACIC SURGERY (CARDIOTHORACIC VASCULAR SURGERY)

## 2024-02-22 PROCEDURE — 27201423 OPTIME MED/SURG SUP & DEVICES STERILE SUPPLY: Performed by: THORACIC SURGERY (CARDIOTHORACIC VASCULAR SURGERY)

## 2024-02-22 PROCEDURE — C1729 CATH, DRAINAGE: HCPCS | Performed by: THORACIC SURGERY (CARDIOTHORACIC VASCULAR SURGERY)

## 2024-02-22 PROCEDURE — 37000008 HC ANESTHESIA 1ST 15 MINUTES: Performed by: THORACIC SURGERY (CARDIOTHORACIC VASCULAR SURGERY)

## 2024-02-22 PROCEDURE — 02L70CK OCCLUSION OF LEFT ATRIAL APPENDAGE WITH EXTRALUMINAL DEVICE, OPEN APPROACH: ICD-10-PCS | Performed by: THORACIC SURGERY (CARDIOTHORACIC VASCULAR SURGERY)

## 2024-02-22 PROCEDURE — C1713 ANCHOR/SCREW BN/BN,TIS/BN: HCPCS | Performed by: THORACIC SURGERY (CARDIOTHORACIC VASCULAR SURGERY)

## 2024-02-22 PROCEDURE — 86923 COMPATIBILITY TEST ELECTRIC: CPT | Performed by: THORACIC SURGERY (CARDIOTHORACIC VASCULAR SURGERY)

## 2024-02-22 PROCEDURE — 36000713 HC OR TIME LEV V EA ADD 15 MIN: Performed by: THORACIC SURGERY (CARDIOTHORACIC VASCULAR SURGERY)

## 2024-02-22 DEVICE — IMPLANTABLE DEVICE: Type: IMPLANTABLE DEVICE | Site: CHEST | Status: FUNCTIONAL

## 2024-02-22 RX ORDER — OXYCODONE HYDROCHLORIDE 10 MG/1
10 TABLET ORAL EVERY 4 HOURS PRN
Status: DISCONTINUED | OUTPATIENT
Start: 2024-02-22 | End: 2024-02-23

## 2024-02-22 RX ORDER — PHENYLEPHRINE HYDROCHLORIDE 10 MG/ML
INJECTION INTRAVENOUS
Status: DISCONTINUED | OUTPATIENT
Start: 2024-02-22 | End: 2024-02-22

## 2024-02-22 RX ORDER — ALBUMIN HUMAN 50 G/1000ML
SOLUTION INTRAVENOUS
Status: DISCONTINUED | OUTPATIENT
Start: 2024-02-22 | End: 2024-02-22

## 2024-02-22 RX ORDER — PROTAMINE SULFATE 10 MG/ML
INJECTION, SOLUTION INTRAVENOUS
Status: DISCONTINUED | OUTPATIENT
Start: 2024-02-22 | End: 2024-02-22

## 2024-02-22 RX ORDER — ASPIRIN 81 MG/1
81 TABLET ORAL DAILY
Status: DISCONTINUED | OUTPATIENT
Start: 2024-02-23 | End: 2024-02-24 | Stop reason: SDUPTHER

## 2024-02-22 RX ORDER — ACETAMINOPHEN 10 MG/ML
1000 INJECTION, SOLUTION INTRAVENOUS EVERY 6 HOURS
Status: COMPLETED | OUTPATIENT
Start: 2024-02-22 | End: 2024-02-23

## 2024-02-22 RX ORDER — ACETAMINOPHEN 10 MG/ML
1000 INJECTION, SOLUTION INTRAVENOUS EVERY 6 HOURS
Status: DISCONTINUED | OUTPATIENT
Start: 2024-02-22 | End: 2024-02-22

## 2024-02-22 RX ORDER — VASOPRESSIN 20 [USP'U]/ML
INJECTION, SOLUTION INTRAMUSCULAR; SUBCUTANEOUS
Status: DISCONTINUED | OUTPATIENT
Start: 2024-02-22 | End: 2024-02-22

## 2024-02-22 RX ORDER — CALCIUM GLUCONATE 20 MG/ML
3 INJECTION, SOLUTION INTRAVENOUS
Status: DISCONTINUED | OUTPATIENT
Start: 2024-02-22 | End: 2024-03-01 | Stop reason: HOSPADM

## 2024-02-22 RX ORDER — FAMOTIDINE 10 MG/ML
20 INJECTION INTRAVENOUS DAILY
Status: DISCONTINUED | OUTPATIENT
Start: 2024-02-22 | End: 2024-03-01 | Stop reason: HOSPADM

## 2024-02-22 RX ORDER — ALBUMIN HUMAN 50 G/1000ML
12.5 SOLUTION INTRAVENOUS
Status: COMPLETED | OUTPATIENT
Start: 2024-02-22 | End: 2024-02-23

## 2024-02-22 RX ORDER — DOCUSATE SODIUM 100 MG/1
100 CAPSULE, LIQUID FILLED ORAL 2 TIMES DAILY
Status: DISCONTINUED | OUTPATIENT
Start: 2024-02-23 | End: 2024-02-24 | Stop reason: SDUPTHER

## 2024-02-22 RX ORDER — LOPERAMIDE HYDROCHLORIDE 2 MG/1
2 CAPSULE ORAL CONTINUOUS PRN
Status: DISCONTINUED | OUTPATIENT
Start: 2024-02-22 | End: 2024-03-01 | Stop reason: HOSPADM

## 2024-02-22 RX ORDER — ACETAMINOPHEN 650 MG/20.3ML
650 LIQUID ORAL EVERY 6 HOURS PRN
Status: DISCONTINUED | OUTPATIENT
Start: 2024-02-22 | End: 2024-03-01 | Stop reason: HOSPADM

## 2024-02-22 RX ORDER — ONDANSETRON HYDROCHLORIDE 2 MG/ML
4 INJECTION, SOLUTION INTRAVENOUS EVERY 4 HOURS PRN
Status: DISCONTINUED | OUTPATIENT
Start: 2024-02-22 | End: 2024-03-01 | Stop reason: HOSPADM

## 2024-02-22 RX ORDER — MUPIROCIN 20 MG/G
OINTMENT TOPICAL
Status: COMPLETED | OUTPATIENT
Start: 2024-02-22 | End: 2024-02-22

## 2024-02-22 RX ORDER — POTASSIUM CHLORIDE 14.9 MG/ML
20 INJECTION INTRAVENOUS
Status: DISCONTINUED | OUTPATIENT
Start: 2024-02-22 | End: 2024-02-27

## 2024-02-22 RX ORDER — INDOMETHACIN 25 MG/1
50 CAPSULE ORAL ONCE
Status: COMPLETED | OUTPATIENT
Start: 2024-02-22 | End: 2024-02-22

## 2024-02-22 RX ORDER — HEPARIN SODIUM 1000 [USP'U]/ML
INJECTION, SOLUTION INTRAVENOUS; SUBCUTANEOUS
Status: DISCONTINUED | OUTPATIENT
Start: 2024-02-22 | End: 2024-02-22 | Stop reason: HOSPADM

## 2024-02-22 RX ORDER — FOLIC ACID 1 MG/1
1 TABLET ORAL DAILY
Status: DISCONTINUED | OUTPATIENT
Start: 2024-02-23 | End: 2024-03-01 | Stop reason: HOSPADM

## 2024-02-22 RX ORDER — MAGNESIUM SULFATE HEPTAHYDRATE 40 MG/ML
4 INJECTION, SOLUTION INTRAVENOUS
Status: DISCONTINUED | OUTPATIENT
Start: 2024-02-22 | End: 2024-03-01 | Stop reason: HOSPADM

## 2024-02-22 RX ORDER — ROCURONIUM BROMIDE 10 MG/ML
INJECTION, SOLUTION INTRAVENOUS
Status: DISCONTINUED | OUTPATIENT
Start: 2024-02-22 | End: 2024-02-22

## 2024-02-22 RX ORDER — ENOXAPARIN SODIUM 100 MG/ML
40 INJECTION SUBCUTANEOUS EVERY 24 HOURS
Status: DISCONTINUED | OUTPATIENT
Start: 2024-02-23 | End: 2024-03-01 | Stop reason: HOSPADM

## 2024-02-22 RX ORDER — METOPROLOL TARTRATE 25 MG/1
12.5 TABLET ORAL 2 TIMES DAILY
Status: DISCONTINUED | OUTPATIENT
Start: 2024-02-23 | End: 2024-02-29

## 2024-02-22 RX ORDER — ONDANSETRON 2 MG/ML
INJECTION INTRAMUSCULAR; INTRAVENOUS
Status: DISCONTINUED | OUTPATIENT
Start: 2024-02-22 | End: 2024-02-22

## 2024-02-22 RX ORDER — CILOSTAZOL 50 MG/1
50 TABLET ORAL 2 TIMES DAILY
Status: ON HOLD | COMMUNITY
End: 2024-03-01 | Stop reason: HOSPADM

## 2024-02-22 RX ORDER — LIDOCAINE HYDROCHLORIDE 20 MG/ML
INJECTION INTRAVENOUS
Status: DISCONTINUED | OUTPATIENT
Start: 2024-02-22 | End: 2024-02-22

## 2024-02-22 RX ORDER — CALCIUM GLUCONATE 20 MG/ML
1 INJECTION, SOLUTION INTRAVENOUS
Status: DISCONTINUED | OUTPATIENT
Start: 2024-02-22 | End: 2024-03-01 | Stop reason: HOSPADM

## 2024-02-22 RX ORDER — PROPOFOL 10 MG/ML
VIAL (ML) INTRAVENOUS
Status: DISCONTINUED | OUTPATIENT
Start: 2024-02-22 | End: 2024-02-22

## 2024-02-22 RX ORDER — MAGNESIUM SULFATE HEPTAHYDRATE 40 MG/ML
2 INJECTION, SOLUTION INTRAVENOUS
Status: DISCONTINUED | OUTPATIENT
Start: 2024-02-22 | End: 2024-03-01 | Stop reason: HOSPADM

## 2024-02-22 RX ORDER — MORPHINE SULFATE 4 MG/ML
4 INJECTION, SOLUTION INTRAMUSCULAR; INTRAVENOUS EVERY 4 HOURS PRN
Status: DISCONTINUED | OUTPATIENT
Start: 2024-02-22 | End: 2024-02-23

## 2024-02-22 RX ORDER — METOCLOPRAMIDE HYDROCHLORIDE 5 MG/ML
5 INJECTION INTRAMUSCULAR; INTRAVENOUS EVERY 6 HOURS PRN
Status: DISCONTINUED | OUTPATIENT
Start: 2024-02-22 | End: 2024-03-01 | Stop reason: HOSPADM

## 2024-02-22 RX ORDER — FENTANYL CITRATE 50 UG/ML
INJECTION, SOLUTION INTRAMUSCULAR; INTRAVENOUS
Status: DISCONTINUED | OUTPATIENT
Start: 2024-02-22 | End: 2024-02-22

## 2024-02-22 RX ORDER — HEPARIN SODIUM 1000 [USP'U]/ML
INJECTION, SOLUTION INTRAVENOUS; SUBCUTANEOUS
Status: DISCONTINUED | OUTPATIENT
Start: 2024-02-22 | End: 2024-02-22

## 2024-02-22 RX ORDER — CEFAZOLIN SODIUM 2 G/50ML
2 SOLUTION INTRAVENOUS
Status: COMPLETED | OUTPATIENT
Start: 2024-02-22 | End: 2024-02-23

## 2024-02-22 RX ORDER — ETOMIDATE 2 MG/ML
INJECTION INTRAVENOUS
Status: DISCONTINUED | OUTPATIENT
Start: 2024-02-22 | End: 2024-02-22

## 2024-02-22 RX ORDER — CALCIUM GLUCONATE 20 MG/ML
2 INJECTION, SOLUTION INTRAVENOUS
Status: DISCONTINUED | OUTPATIENT
Start: 2024-02-22 | End: 2024-03-01 | Stop reason: HOSPADM

## 2024-02-22 RX ORDER — HYDROCODONE BITARTRATE AND ACETAMINOPHEN 500; 5 MG/1; MG/1
TABLET ORAL
Status: DISCONTINUED | OUTPATIENT
Start: 2024-02-22 | End: 2024-02-22 | Stop reason: HOSPADM

## 2024-02-22 RX ORDER — DEXMEDETOMIDINE HYDROCHLORIDE 4 UG/ML
0-1.4 INJECTION, SOLUTION INTRAVENOUS CONTINUOUS
Status: DISCONTINUED | OUTPATIENT
Start: 2024-02-22 | End: 2024-02-24

## 2024-02-22 RX ORDER — MIDAZOLAM HYDROCHLORIDE 1 MG/ML
INJECTION INTRAMUSCULAR; INTRAVENOUS
Status: DISCONTINUED | OUTPATIENT
Start: 2024-02-22 | End: 2024-02-22

## 2024-02-22 RX ORDER — MUPIROCIN 20 MG/G
OINTMENT TOPICAL 2 TIMES DAILY
Status: DISCONTINUED | OUTPATIENT
Start: 2024-02-22 | End: 2024-02-24 | Stop reason: SDUPTHER

## 2024-02-22 RX ORDER — TRANEXAMIC ACID 100 MG/ML
INJECTION, SOLUTION INTRAVENOUS
Status: DISCONTINUED | OUTPATIENT
Start: 2024-02-22 | End: 2024-02-22

## 2024-02-22 RX ORDER — DEXTROSE MONOHYDRATE AND SODIUM CHLORIDE 5; .45 G/100ML; G/100ML
INJECTION, SOLUTION INTRAVENOUS CONTINUOUS
Status: DISCONTINUED | OUTPATIENT
Start: 2024-02-22 | End: 2024-02-25

## 2024-02-22 RX ORDER — POTASSIUM CHLORIDE 14.9 MG/ML
40 INJECTION INTRAVENOUS
Status: DISCONTINUED | OUTPATIENT
Start: 2024-02-22 | End: 2024-02-27

## 2024-02-22 RX ORDER — LACTULOSE 10 G/15ML
20 SOLUTION ORAL EVERY 6 HOURS PRN
Status: DISCONTINUED | OUTPATIENT
Start: 2024-02-22 | End: 2024-03-01 | Stop reason: HOSPADM

## 2024-02-22 RX ORDER — HYDROCODONE BITARTRATE AND ACETAMINOPHEN 5; 325 MG/1; MG/1
1 TABLET ORAL EVERY 4 HOURS PRN
Status: DISCONTINUED | OUTPATIENT
Start: 2024-02-22 | End: 2024-02-23

## 2024-02-22 RX ORDER — POTASSIUM CHLORIDE 14.9 MG/ML
20 INJECTION INTRAVENOUS
Status: COMPLETED | OUTPATIENT
Start: 2024-02-22 | End: 2024-02-22

## 2024-02-22 RX ORDER — PAPAVERINE HYDROCHLORIDE 30 MG/ML
INJECTION INTRAMUSCULAR; INTRAVENOUS
Status: DISCONTINUED | OUTPATIENT
Start: 2024-02-22 | End: 2024-02-22 | Stop reason: HOSPADM

## 2024-02-22 RX ORDER — SUCRALFATE 1 G/1
1 TABLET ORAL
Status: DISCONTINUED | OUTPATIENT
Start: 2024-02-23 | End: 2024-03-01 | Stop reason: HOSPADM

## 2024-02-22 RX ADMIN — VASOPRESSIN 0.5 UNITS: 20 INJECTION INTRAVENOUS at 08:02

## 2024-02-22 RX ADMIN — POTASSIUM CHLORIDE 40 MEQ: 14.9 INJECTION, SOLUTION INTRAVENOUS at 11:02

## 2024-02-22 RX ADMIN — SUGAMMADEX 200 MG: 100 INJECTION, SOLUTION INTRAVENOUS at 10:02

## 2024-02-22 RX ADMIN — MUPIROCIN: 20 OINTMENT TOPICAL at 08:02

## 2024-02-22 RX ADMIN — POTASSIUM CHLORIDE 20 MEQ: 14.9 INJECTION, SOLUTION INTRAVENOUS at 05:02

## 2024-02-22 RX ADMIN — FENTANYL CITRATE 150 MCG: 50 INJECTION, SOLUTION INTRAMUSCULAR; INTRAVENOUS at 06:02

## 2024-02-22 RX ADMIN — ACETAMINOPHEN 1000 MG: 10 INJECTION, SOLUTION INTRAVENOUS at 03:02

## 2024-02-22 RX ADMIN — TRANEXAMIC ACID 1000 MG: 100 INJECTION, SOLUTION INTRAVENOUS at 07:02

## 2024-02-22 RX ADMIN — SODIUM BICARBONATE 50 MEQ: 84 INJECTION, SOLUTION INTRAVENOUS at 03:02

## 2024-02-22 RX ADMIN — EPINEPHRINE 0.02 MCG/KG/MIN: 1 INJECTION INTRAMUSCULAR; INTRAVENOUS; SUBCUTANEOUS at 09:02

## 2024-02-22 RX ADMIN — ROCURONIUM BROMIDE 80 MG: 10 SOLUTION INTRAVENOUS at 06:02

## 2024-02-22 RX ADMIN — OXYCODONE HYDROCHLORIDE 10 MG: 10 TABLET ORAL at 08:02

## 2024-02-22 RX ADMIN — ALBUMIN (HUMAN) 250 ML: 2.5 SOLUTION INTRAVENOUS at 10:02

## 2024-02-22 RX ADMIN — PROTAMINE SULFATE 400 MG: 10 INJECTION, SOLUTION INTRAVENOUS at 09:02

## 2024-02-22 RX ADMIN — TRANEXAMIC ACID 1000 MG: 100 INJECTION, SOLUTION INTRAVENOUS at 09:02

## 2024-02-22 RX ADMIN — ONDANSETRON 500 MG: 2 INJECTION INTRAMUSCULAR; INTRAVENOUS at 09:02

## 2024-02-22 RX ADMIN — MUPIROCIN: 20 OINTMENT TOPICAL at 05:02

## 2024-02-22 RX ADMIN — CEFAZOLIN SODIUM 2 G: 2 SOLUTION INTRAVENOUS at 08:02

## 2024-02-22 RX ADMIN — ACETAMINOPHEN 1000 MG: 10 INJECTION, SOLUTION INTRAVENOUS at 11:02

## 2024-02-22 RX ADMIN — PROPOFOL 50 MG: 10 INJECTION, EMULSION INTRAVENOUS at 07:02

## 2024-02-22 RX ADMIN — SODIUM CHLORIDE 2 UNITS/HR: 9 INJECTION, SOLUTION INTRAVENOUS at 08:02

## 2024-02-22 RX ADMIN — MORPHINE SULFATE 4 MG: 4 INJECTION, SOLUTION INTRAMUSCULAR; INTRAVENOUS at 11:02

## 2024-02-22 RX ADMIN — ONDANSETRON 4 MG: 2 INJECTION INTRAMUSCULAR; INTRAVENOUS at 11:02

## 2024-02-22 RX ADMIN — LIDOCAINE HYDROCHLORIDE 80 MG: 20 INJECTION INTRAVENOUS at 06:02

## 2024-02-22 RX ADMIN — ROCURONIUM BROMIDE 20 MG: 10 SOLUTION INTRAVENOUS at 07:02

## 2024-02-22 RX ADMIN — ALBUMIN (HUMAN) 12.5 G: 2.5 SOLUTION INTRAVENOUS at 10:02

## 2024-02-22 RX ADMIN — PHENYLEPHRINE HYDROCHLORIDE 100 MCG: 10 INJECTION INTRAVENOUS at 08:02

## 2024-02-22 RX ADMIN — FENTANYL CITRATE 100 MCG: 50 INJECTION, SOLUTION INTRAMUSCULAR; INTRAVENOUS at 07:02

## 2024-02-22 RX ADMIN — ALBUMIN (HUMAN) 12.5 G: 2.5 SOLUTION INTRAVENOUS at 02:02

## 2024-02-22 RX ADMIN — FENTANYL CITRATE 250 MCG: 50 INJECTION, SOLUTION INTRAMUSCULAR; INTRAVENOUS at 07:02

## 2024-02-22 RX ADMIN — MORPHINE SULFATE 4 MG: 4 INJECTION, SOLUTION INTRAMUSCULAR; INTRAVENOUS at 05:02

## 2024-02-22 RX ADMIN — PHENYLEPHRINE HYDROCHLORIDE 100 MCG: 10 INJECTION INTRAVENOUS at 07:02

## 2024-02-22 RX ADMIN — DEXTROSE MONOHYDRATE 1.5 G: 50 INJECTION, SOLUTION INTRAVENOUS at 07:02

## 2024-02-22 RX ADMIN — ONDANSETRON 4 MG: 2 INJECTION INTRAMUSCULAR; INTRAVENOUS at 05:02

## 2024-02-22 RX ADMIN — ETOMIDATE 20 MG: 2 INJECTION INTRAVENOUS at 06:02

## 2024-02-22 RX ADMIN — PROPOFOL 150 MG: 10 INJECTION, EMULSION INTRAVENOUS at 06:02

## 2024-02-22 RX ADMIN — HEPARIN SODIUM 40000 UNITS: 1000 INJECTION, SOLUTION INTRAVENOUS; SUBCUTANEOUS at 07:02

## 2024-02-22 RX ADMIN — DEXMEDETOMIDINE 0.5 MCG/KG/HR: 200 INJECTION, SOLUTION INTRAVENOUS at 09:02

## 2024-02-22 RX ADMIN — INSULIN HUMAN 5 UNITS: 100 INJECTION, SOLUTION PARENTERAL at 08:02

## 2024-02-22 RX ADMIN — SODIUM CHLORIDE: 9 INJECTION, SOLUTION INTRAVENOUS at 06:02

## 2024-02-22 RX ADMIN — MIDAZOLAM HYDROCHLORIDE 2 MG: 1 INJECTION, SOLUTION INTRAMUSCULAR; INTRAVENOUS at 06:02

## 2024-02-22 NOTE — ANESTHESIA POSTPROCEDURE EVALUATION
Anesthesia Post Evaluation    Patient: Rustam Matthews    Procedure(s) Performed: Procedure(s) (LRB):  CORONARY ARTERY BYPASS GRAFT (CABG) (N/A)  SURGICAL PROCUREMENT, VEIN, ENDOSCOPIC (N/A)  EXCLUSION, LEFT ATRIAL APPENDAGE (N/A)  ENDARTERECTOMY (N/A)    Final Anesthesia Type: general      Patient location during evaluation: ICU  Patient participation: No - Unable to Participate, Intubation  Level of consciousness: sedated  Post-procedure vital signs: reviewed and stable  Pain management: adequate  Airway patency: patent  DAMIAN mitigation strategies: Postoperative administration of CPAP, nasopharyngeal airway, or oral appliance in the postanesthesia care unit (PACU)  PONV status at discharge: No PONV  Anesthetic complications: no      Cardiovascular status: hemodynamically stable  Respiratory status: ventilator  Hydration status: euvolemic  Follow-up not needed.            Vitals Value Taken Time   /60 02/22/24 1059   Temp 37 02/22/24 1059   Pulse 80 02/22/24 1059   Resp 12 02/22/24 1059   SpO2 97 02/22/24 1059         No case tracking events are documented in the log.      Pain/Danial Score: No data recorded

## 2024-02-22 NOTE — ANESTHESIA PROCEDURE NOTES
BAILEY    Diagnosis: cad  Patient location during procedure: OR  Timeout: 2/22/2024 9:45 AM  Procedure end time: 2/22/2024 7:48 PM  Exam type: Final    Staffing  Performed: anesthesiologist     Anesthesiologist: Clyde Ojeda MD        Anesthesiologist Present  Yes      Setup & Induction  Exam: limited  Exam     Left Heart  Left Atruim: normal    Left Ventricle: cm, normal (men 4.2-5.9; women 3.8-5.2)  LV Wall Thickness (posterior wall):cm, severly abnormal (men >1.6 cm; women > 1.5 cm)    LVAD:no  Estimated Ejection Fraction: > 55% normal  Regional Wall Abnormalities: no RWMA                                Great Vessels  IABP: no  Aorta    Descending aorta IABP: no    Effusions    SummaryFindings discussed with surgeon.    Other Findings

## 2024-02-22 NOTE — OP NOTE
Preop diagnosis:  Severe coronary artery disease      Procedure:  Coronary artery bypass grafting X 3 ,   Left internal mammary artery to the LAD   Saphenous venous graft to   OM1  OM1 endarterectomy  Saphenous venous graft to    PDA  Ligation of left atrial appendage with a 40 mm atrial clip  Endoscopic venous harvesting left greater saphenous vein          Postop diagnosis:  The same as preop    Date:  02/22/2024    Anesthesia:  General    Blood loss: Per perfusion     Surgeon: Silvana Bauman MD    Assistant:  SYLVESTER Jones        Under informed consent the  patient was taken the OR, put in a supine position and  general anesthesia was induced and therefore maintained for remainder of the procedure.All the pressure points were padded equally. The skin over the chest abdomen and legs was prepped and draped in the usual sterile fashion. IV antibiotics were administered. Anesthesia has placed the appropriate lines.  Preop BAILEY was performed revealing 40% EF with a no valvular disease    Endoscopic venous harvesting was performed left lower extremity with good quality vein.  A Midline incision was made followed by taking down the subcutaneous tissue and fascia exposing the sternum that was penetrated in the midline. The mammary was harvested on a pedicle, the patient was heparinized. A midline retractor was placed, the pericardium was opened and pericardial stay sutures were placed. Ascending aortic cannulation was performed, the mammary was cut, the distal pedicle was ligated and clipped and proximal pedicle was controlled with a bulldog.The mammary had good flow in it.  Dual stage venous cannula was placed in the right atrium and when the ACT was therapeutic the patient went on full cardiopulmonary bypass with good emptying.  Cross-clamp was placed followed by antegrade dose of cardioplegia and 1 L of cold blood repeated throughout the case at 20-30 minutes intervals.  We had good either isoelectricity throughout  the whole case.  The patient's temperature was allowed to drift to 34° C.    Examination over the lateral aspect of the heart revealed the 1st OM vessel.  This was opened and has a diameter of   2    mm.  It had extensive calcification necessitating extensive endarterectomy.  This was anastomosed to a reverse segment of saphenous vein with good flow down the graft.  The vein was cut to the appropriate length.  The left atrial appendage was ligated using a 40 mm atrial clip  Next the right coronary artery was examined this was opened at the level of  PDA this was more of a Streaker        , this was a  2     mm vessel.  This was anastomosed to reverse segment of saphenous vein with good flow down the graft, the vein was cut to the appropriate lengths.  A slit was made in the pericardium, the LAD was opened in the mid epicardium.  This was  2     millimiters in diameter.  This was anastomosed to the mammary in running Prolene fashion, the mammary pedicle was tacked to the epicardium.  When the mammary bulldog was released there was brisk blood flow down the LAD indicating a very patent anastomosis.  The patient was being rewarmed, the cross-clamp was removed and a  partial occlusion clamp was placed.  Two arteriotomies were performed and a 4 mm punch was used, the proximal anastomoses were made with running Prolene.  The partial occluding clamp was released.  The left chest was evacuated of blood.  The proximal and distal anastomosis were checked for hemostasis.  When the patient was warm, he came off pump with no problem.  Heparin was reversed with protamine.  The mediastinum and left chest were drained. The patient was decannulated.  The sternum was wired and the wounds were closed in layers absorbable sutures. The patient tolerated the procedure well.  Postop BAILEY revealed much better ventricular contractility      he was transferred to the ICU in acceptable condition.

## 2024-02-22 NOTE — ANESTHESIA PREPROCEDURE EVALUATION
02/22/2024  Rustam Matthews is a 63 y.o., male.      Pre-op Assessment    I have reviewed the Patient Summary Reports.     I have reviewed the Nursing Notes. I have reviewed the NPO Status.   I have reviewed the Medications.     Review of Systems  Social:  Smoker       Cardiovascular:     Hypertension   CAD      Angina, with exertion                                  Pulmonary:      Shortness of breath                  Renal/:  Chronic Renal Disease, CKD                Musculoskeletal:  Musculoskeletal Normal                Neurological:  Neurology Normal                                      Endocrine:  Diabetes               Physical Exam  General: Cooperative, Oriented, Well nourished and Alert    Airway:  Mallampati: II   Mouth Opening: Small, but > 3cm  TM Distance: Normal  Tongue: Normal  Neck ROM: Normal ROM    Dental:  Edentulous    Chest/Lungs:  Normal Respiratory Rate, Clear to auscultation    Heart:  Rate: Normal  Rhythm: Regular Rhythm        Anesthesia Plan  Type of Anesthesia, risks & benefits discussed:    Anesthesia Type: Gen ETT  Intra-op Monitoring Plan: Standard ASA Monitors, Central Line, Art Line and BAILEY  Post Op Pain Control Plan: IV/PO Opioids PRN and multimodal analgesia  Induction:  IV  Airway Plan: Direct  Informed Consent: Informed consent signed with the Patient and all parties understand the risks and agree with anesthesia plan.  All questions answered. Patient consented to blood products? Yes  ASA Score: 4  Day of Surgery Review of History & Physical: H&P Update referred to the surgeon/provider.    Ready For Surgery From Anesthesia Perspective.     .

## 2024-02-22 NOTE — ANESTHESIA PROCEDURE NOTES
Intubation    Date/Time: 2/22/2024 7:00 AM    Performed by: Chevy France CRNA  Authorized by: Clyde Ojeda MD    Intubation:     Induction:  Intravenous    Intubated:  Postinduction    Mask Ventilation:  Easy with oral airway    Attempts:  1    Attempted By:  CRNA    Method of Intubation:  Direct    Blade:  Claros 3    Laryngeal View Grade: Grade I - full view of cords      Difficult Airway Encountered?: No      Complications:  None    Airway Device:  Oral endotracheal tube    Airway Device Size:  8.0    Style/Cuff Inflation:  Cuffed    Inflation Amount (mL):  5    Tube secured:  23    Secured at:  The lips    Placement Verified By:  Capnometry    Complicating Factors:  None    Findings Post-Intubation:  BS equal bilateral and atraumatic/condition of teeth unchanged

## 2024-02-22 NOTE — TRANSFER OF CARE
"Anesthesia Transfer of Care Note    Patient: Rustam Matthews    Procedure(s) Performed: Procedure(s) (LRB):  CORONARY ARTERY BYPASS GRAFT (CABG) (N/A)  SURGICAL PROCUREMENT, VEIN, ENDOSCOPIC (N/A)  EXCLUSION, LEFT ATRIAL APPENDAGE (N/A)  ENDARTERECTOMY (N/A)    Patient location: ICU    Anesthesia Type: general    Transport from OR: Transported from OR intubated on 100% O2 by AMBU with adequate controlled ventilation. Upon arrival to PACU/ICU, patient attached to ventilator and auscultated to confirm bilateral breath sounds and adequate TV. Continuous ECG monitoring in transport. Continuous SpO2 monitoring in transport. Continuos invasive BP monitoring in transport    Post pain: adequate analgesia    Post assessment: no apparent anesthetic complications    Post vital signs: stable    Level of consciousness: sedated    Nausea/Vomiting: no vomiting    Complications: none    Transfer of care protocol was followed      Last vitals: Visit Vitals  BP (!) 186/65   Pulse (!) 58   Temp 36.7 °C (98.1 °F) (Oral)   Resp 20   Ht 6' 3" (1.905 m)   Wt 123.9 kg (273 lb 2.4 oz)   SpO2 100%   BMI 34.14 kg/m²     "

## 2024-02-22 NOTE — ANESTHESIA PROCEDURE NOTES
BAILEY    Diagnosis: cad  Patient location during procedure: OR  Timeout: 2/22/2024 7:12 AM  Procedure end time: 2/22/2024 5:25 PM  Exam type: Baseline    Staffing  Performed: anesthesiologist     Anesthesiologist: Clyde Ojeda MD        Anesthesiologist Present  Yes      Setup & Induction  Probe Insertion: easy  Exam: completeDoppler Echo: 2D, color flow mapping and continuous wave Doppler.  Exam     Left Heart  Left Atruim: normal and normal (men 3.0-4.0; women 2.7-3.8)    Left Ventricle: cm, normal (men 4.2-5.9; women 3.8-5.2)  LV Wall Thickness (posterior wall):cm, severly abnormal (men >1.6 cm; women > 1.5 cm)    LVAD:no  Estimated Ejection Fraction: > 55% normal  Regional Wall Abnormalities: no RWMA            Right Heart  Right Ventricle: normal  Right Ventricle Function: normal  Right Atria:  normal    Intra Atrial Septum  PFO: no shunt by color flow doppler  no IAS aneurysm  no lipomatous hypertrophy  no Atrial Septal Defect (Asd)    Right Ventricle  Size: normal, Free Wall Thickness: RVH >/= 0.5cm    Aortic Valve:  Stenosis: none.  Morphology: trileaflet    Regurgitation: no aortic valve regurgitation      Mitral Valve:   Morphology:normal and myxomatous  Prolapse: none  Flail: no flail  Jet Description: noneStenosis: no significant stenosis.    Tricuspid Valve:  Morphology: normal  Regurgitation: mild    Pulmonic Valve:  Morphology:normal  Regurgitation(color flow): none    Great Vessels  Ascending Aorta Atherosclerosis: 2=mild dz (<3mm)  Aortic Arch Atherosclerosis: 2=mild dz (<3mm)  IABP: no  Descending Aorta Atherosclerosis: 3=sessile dz (3-5mm)  Aorta    Descending aorta IABP: no    Effusions pericardial    SummaryFindings discussed with surgeon.    Other Findings

## 2024-02-22 NOTE — H&P
[facility] - [facility] ICU  Pulmonary Critical Care Note    Patient Name: Rustam Matthews  MRN: [id number]  Admission Date: 2/22/2024  Hospital Length of Stay: 0 days  Code Status: Full Code  Attending Provider: Silvana Bauman MD  Primary Care Provider: Vasquez Rodriguez MD     Subjective:     HPI:   Rustam Matthews is a 63 year old male with PMH of  PAD, HTN, DM, CKD 3, chronic ENOCH, recurrent GI bleed (followed by GI and hematology). He underwent LHC with CIS 12/13/23 and was found to have severe multivessel CAD (prox LAD, mid to distal left circumflex, mid RCA) and was thus referred to CT surgery for CABG. He was admitted to [facility] 2/22/24 and underwent CABG x3 left internal mammary artery to LAD, saphenous venous graft to OM1, Om1 endarterectomy, saphenous venous graft to PDA, ligation of left atrial appendage. with CV surgery. He was admitted to ICU postoperatively    Hospital Course/Significant events:  2/22/24: admit to ICU s/p CABG    24 Hour Interval History:      Past Medical History:   Diagnosis Date    Abnormal cardiovascular stress test     Acute anemia     Angiectasia     Angina pectoris     Arthritis     Atherosclerosis of native arteries of extremities with intermittent claudication, unspecified extremity     Back pain     Coronary artery disease     Coronary artery disease, unspecified vessel or lesion type, unspecified whether angina present, unspecified whether native or transplanted heart     Diabetes mellitus     Heart disease     Hypertension     Obesity     Peripheral artery disease     Renal disorder     kidney disease Stage IV    SOB (shortness of breath)     Weakness of both legs        Past Surgical History:   Procedure Laterality Date    COLONOSCOPY      CORONARY ANGIOGRAPHY Right 01/04/2024    Procedure: ANGIOGRAM, CORONARY ARTERY;  Surgeon: Ruben Bee MD;  Location: [facility] CATH LAB;  Service: Cardiology;  Laterality: Right;    LEFT HEART CATHETERIZATION Right 01/04/2024     "Procedure: Left heart cath;  Surgeon: Ruben Bee MD;  Location: [facility] CATH LAB;  Service: Cardiology;  Laterality: Right;  DIAGNOSTIC PERIPHERAL    PERIPHERAL ANGIOGRAPHY      PLACEMENT-STENT      pt states "in one of my legs"    UPPER GASTROINTESTINAL ENDOSCOPY         Social History     Socioeconomic History    Marital status: Single   Tobacco Use    Smoking status: Some Days     Current packs/day: 0.00     Types: Cigarettes, Vaping with nicotine     Last attempt to quit: 2/1/2021     Years since quitting: 3.0    Smokeless tobacco: Never    Tobacco comments:     Quit smoking 2 years ago-only vapes some days    Substance and Sexual Activity    Alcohol use: Never    Drug use: Never    Sexual activity: Not Currently     Partners: Female     Birth control/protection: None     Social Determinants of Health     Financial Resource Strain: Low Risk  (6/1/2023)    Overall Financial Resource Strain (CARDIA)     Difficulty of Paying Living Expenses: Not hard at all   Food Insecurity: No Food Insecurity (6/1/2023)    Hunger Vital Sign     Worried About Running Out of Food in the Last Year: Never true     Ran Out of Food in the Last Year: Never true   Transportation Needs: No Transportation Needs (6/1/2023)    PRAPARE - Transportation     Lack of Transportation (Medical): No     Lack of Transportation (Non-Medical): No   Physical Activity: Inactive (6/1/2023)    Exercise Vital Sign     Days of Exercise per Week: 0 days     Minutes of Exercise per Session: 0 min   Stress: No Stress Concern Present (6/1/2023)    [employer] [place] [employer] - Occupational Stress Questionnaire     Feeling of Stress : Not at all   Social Connections: Socially Isolated (6/1/2023)    Social Connection and Isolation Panel [NHANES]     Frequency of Communication with Friends and Family: Twice a week     Frequency of Social Gatherings with Friends and Family: Never     Attends Buddhism Services: 1 to 4 times per year     Active Member " of Clubs or Organizations: No     Attends Club or Organization Meetings: Never     Marital Status:    Housing Stability: Low Risk  (6/1/2023)    Housing Stability Vital Sign     Unable to Pay for Housing in the Last Year: No     Number of Places Lived in the Last Year: 1     Unstable Housing in the Last Year: No           Current Outpatient Medications   Medication Instructions    acyclovir (ZOVIRAX) 400 mg, Oral, As needed (PRN)    ALPRAZolam (XANAX) 1 mg, Oral, 2 times daily    amitriptyline (ELAVIL) 50 mg, Oral, Nightly    amLODIPine (NORVASC) 10 mg, Oral    aspirin (ECOTRIN) 81 mg, Oral, Daily    atenoloL (TENORMIN) 50 mg, Oral, Daily    calcitRIOL (ROCALTROL) 0.5 mcg, Oral, Four times weekly    cilostazoL (PLETAL) 50 mg, Oral, 2 times daily    clopidogreL (PLAVIX) 75 mg, Oral, Daily    furosemide (LASIX) 20 mg, Oral, Daily, Take 2 tablets by mouth once a day as needed for swelling    glimepiride (AMARYL) 4 mg, Oral, Daily    KLOR-CON 10 10 mEq TbSR 10 mEq, Oral, Daily    metoprolol succinate (TOPROL-XL) 100 mg, Oral, Daily    nitroGLYCERIN (NITROSTAT) 0.4 MG SL tablet Sublingual    olmesartan-hydrochlorothiazide (BENICAR HCT) 40-25 mg per tablet 1 tablet, Oral, Daily    omeprazole (PRILOSEC) 40 mg, Oral, Daily    ranolazine (RANEXA) 500 mg, Oral, 2 times daily    rosuvastatin (CRESTOR) 40 mg, Oral, Nightly    sildenafiL (VIAGRA) 100 mg, Oral, Daily PRN    sodium bicarbonate 650 mg, Oral, 2 times daily       Current Inpatient Medications   [START ON 2/23/2024] aspirin  81 mg Oral Daily    ceFAZolin (Ancef) IV (PEDS and ADULTS)  2 g Intravenous Q12H    [START ON 2/23/2024] docusate sodium  100 mg Oral BID    [START ON 2/23/2024] enoxparin  40 mg Subcutaneous Daily    famotidine (PF)  20 mg Intravenous Daily    [START ON 2/23/2024] folic acid  1 mg Oral Daily    [START ON 2/23/2024] metoprolol tartrate  12.5 mg Oral BID    mupirocin   Nasal BID    [START ON 2/23/2024] sucralfate  1 g Oral QID (AC & HS)        Current Intravenous Infusions   dexmedeTOMIDine (Precedex) infusion (titrating)      dextrose 5 % and 0.45 % NaCl      EPINEPHrine      insulin regular 1 units/mL infusion orderable (CTS POST-OP)      loperamide           Review of Systems   Unable to perform ROS: Intubated          Objective:       Intake/Output Summary (Last 24 hours) at 2/22/2024 1326  Last data filed at 2/22/2024 1300  Gross per 24 hour   Intake 2450 ml   Output 345 ml   Net 2105 ml         Vital Signs (Most Recent):  Temp: 97.9 °F (36.6 °C) (02/22/24 1115)  Pulse: 64 (02/22/24 1300)  Resp: 16 (02/22/24 1300)  BP: (!) 92/49 (02/22/24 1300)  SpO2: 100 % (02/22/24 1300)  Body mass index is 34.14 kg/m².  Weight: 123.9 kg (273 lb 2.4 oz) Vital Signs (24h Range):  Temp:  [97.9 °F (36.6 °C)-98.2 °F (36.8 °C)] 97.9 °F (36.6 °C)  Pulse:  [58-69] 64  Resp:  [14-20] 16  SpO2:  [97 %-100 %] 100 %  BP: ()/(49-79) 92/49     Physical Exam  Vitals and nursing note reviewed.   Constitutional:       General: He is not in acute distress.     Comments: Intubated and sedated    HENT:      Head: Normocephalic.      Right Ear: External ear normal.      Left Ear: External ear normal.      Nose: Nose normal.      Mouth/Throat:      Mouth: Mucous membranes are moist.   Eyes:      Conjunctiva/sclera: Conjunctivae normal.      Pupils: Pupils are equal, round, and reactive to light.   Cardiovascular:      Rate and Rhythm: Normal rate and regular rhythm.      Heart sounds: No murmur heard.  Pulmonary:      Effort: No respiratory distress.      Breath sounds: No wheezing or rhonchi.   Abdominal:      General: There is no distension.      Palpations: Abdomen is soft.      Tenderness: There is no abdominal tenderness. There is no guarding.   Musculoskeletal:         General: No swelling or deformity.   Skin:     General: Skin is warm and dry.      Capillary Refill: Capillary refill takes less than 2 seconds.   Neurological:      Comments: Exam limited 2/2  intubation and sedation           Lines/Drains/Airways       Central Venous Catheter Line  Duration             Percutaneous Central Line Insertion/Assessment - Triple Lumen  02/22/24 1012 Internal Jugular Right <1 day              Drain  Duration                  Chest Tube 02/22/24 Tube - 1 Anterior Mediastinal 28 Fr. <1 day         Chest Tube 02/22/24 Tube - 2 Left Pleural 28 Fr. <1 day         Urethral Catheter 02/22/24 0655 Temperature probe 16 Fr. <1 day              Airway  Duration                  Airway - Non-Surgical 02/22/24 0700 <1 day              Peripheral Intravenous Line  Duration                  Peripheral IV - Single Lumen 02/22/24 0544 18 G Anterior;Right Forearm <1 day                    Significant Labs:    Lab Results   Component Value Date    WBC 10.73 02/22/2024    HGB 9.4 (L) 02/22/2024    HCT 29 (L) 02/22/2024    MCV 93.3 02/22/2024     (L) 02/22/2024         BMP  Lab Results   Component Value Date     02/22/2024    K 3.4 (L) 02/22/2024    CO2 24 02/22/2024    BUN 19.4 02/22/2024    CREATININE 2.69 (H) 02/22/2024    CALCIUM 9.7 02/22/2024       ABG  Recent Labs   Lab 02/22/24  1018 02/22/24  1150   PH 7.255* 7.330*   PO2 233* 161.0*   PCO2 55.0* 43.0   HCO3 24.4 22.7   BE -3*  --        Mechanical Ventilation Support:  Vent Mode: SIMV (02/22/24 1050)  Set Rate: 20 BPM (02/22/24 1050)  Vt Set: 500 mL (02/22/24 1050)  Pressure Support: 10 cmH20 (02/22/24 1050)  PEEP/CPAP: 5 cmH20 (02/22/24 1050)  Oxygen Concentration (%): 60 (02/22/24 1050)  Peak Airway Pressure: 16 cmH20 (02/22/24 1050)  Total Ve: 8.1 L/m (02/22/24 1050)  F/VT Ratio<105 (RSBI): (!) 50.89 (02/22/24 1050)    Significant Imaging:  I have reviewed the pertinent imaging within the past 24 hours.        Assessment/Plan:     Assessment  Severe multivessel CAD s/p CABG x3 2/22/24  PAD  HTN  DM  History of chronic ENOCH and recurrent GI bleed (followed by GI/ Hematology)      Plan  Continue with post op CABG  protocol  Wean mechanical ventilation, extubate per protocol  Wean vasopressors as tolerated  Chest tube management per CV surgery, monitor for increased output    DVT Prophylaxis: lovenox  GI Prophylaxis: famotidine     Greater than 30 minutes of critical care was time spent personally by me on the following activities: development of treatment plan with patient or surrogate and bedside caregivers, discussions with consultants, evaluation of patient's response to treatment, examination of patient, ordering and performing treatments and interventions, ordering and review of laboratory studies, ordering and review of radiographic studies, pulse oximetry, re-evaluation of patient's condition.  This critical care time did not overlap with that of any other provider or involve time for any procedures.     Hermelinda Gómez MD  Pulmonary Critical Care Medicine  [facility] - [facility] ICU

## 2024-02-22 NOTE — ANESTHESIA PROCEDURE NOTES
Central Line    Diagnosis: cad  Patient location during procedure: done in OR  Procedure start time: 2/22/2024 7:01 AM  Timeout: 2/22/2024 7:01 AM  Procedure end time: 2/22/2024 7:10 AM    Staffing  Authorizing Provider: Clyde Ojeda MD  Performing Provider: Clyde Ojeda MD    Staffing  Performed: anesthesiologist   Anesthesiologist: Clyde Ojeda MD  Performed by: Clyde Ojeda MD  Authorized by: Clyde Ojeda MD    Anesthesiologist was present at the time of the procedure.  Preanesthetic Checklist  Completed: patient identified, IV checked, site marked, risks and benefits discussed, surgical consent, monitors and equipment checked, pre-op evaluation, timeout performed and anesthesia consent given  Indication   Indication: hemodynamic monitoring, vascular access, med administration     Anesthesia   general anesthesia    Central Line   Skin Prep: skin prepped with ChloraPrep, skin prep agent completely dried prior to procedure  Sterile Barriers Followed: Yes    All five maximal barriers used- gloves, gown, cap, mask, and large sterile sheet    hand hygiene performed prior to central venous catheter insertion  Location: right internal jugular.   Catheter type: triple lumen  Catheter Size: 7.5 Fr  Inserted Catheter Length: 14 cm  Ultrasound: vascular probe with ultrasound   Vessel Caliber: large, patent, compressibility normal  Needle advanced into vessel with real time Ultrasound guidance.  Guidewire confirmed in vessel.  Image recorded and saved.  sterile gel and probe cover used in ultrasound-guided central venous catheter insertion  Manometry: none  Insertion Attempts: 1   Securement:line sutured, chlorhexidine patch, sterile dressing applied and blood return through all ports    Post-Procedure    Adverse Events:none      Guidewire Guidewire removed intact. Guidewire removed intact, verified with nurse.

## 2024-02-23 LAB
ALBUMIN SERPL-MCNC: 3.1 G/DL (ref 3.4–4.8)
ALBUMIN/GLOB SERPL: 1.2 RATIO (ref 1.1–2)
ALLENS TEST BLOOD GAS (OHS): ABNORMAL
ALP SERPL-CCNC: 60 UNIT/L (ref 40–150)
ALT SERPL-CCNC: 9 UNIT/L (ref 0–55)
AST SERPL-CCNC: 34 UNIT/L (ref 5–34)
BASE EXCESS BLD CALC-SCNC: -3.3 MMOL/L (ref -2–2)
BILIRUB SERPL-MCNC: 0.3 MG/DL
BLOOD GAS SAMPLE TYPE (OHS): ABNORMAL
BUN SERPL-MCNC: 19.3 MG/DL (ref 8.4–25.7)
CA-I BLD-SCNC: 1.13 MMOL/L (ref 1.12–1.23)
CALCIUM SERPL-MCNC: 8.3 MG/DL (ref 8.8–10)
CHLORIDE SERPL-SCNC: 112 MMOL/L (ref 98–107)
CO2 BLDA-SCNC: 23.3 MMOL/L
CO2 SERPL-SCNC: 21 MMOL/L (ref 23–31)
COHGB MFR BLDA: 2 % (ref 0.5–1.5)
CREAT SERPL-MCNC: 2.75 MG/DL (ref 0.73–1.18)
DRAWN BY BLOOD GAS (OHS): ABNORMAL
ERYTHROCYTE [DISTWIDTH] IN BLOOD BY AUTOMATED COUNT: 14.6 % (ref 11.5–17)
GFR SERPLBLD CREATININE-BSD FMLA CKD-EPI: 25 MLS/MIN/1.73/M2
GLOBULIN SER-MCNC: 2.6 GM/DL (ref 2.4–3.5)
GLUCOSE SERPL-MCNC: 154 MG/DL (ref 82–115)
HCO3 BLDA-SCNC: 22.1 MMOL/L (ref 22–26)
HCT VFR BLD AUTO: 28.8 % (ref 42–52)
HGB BLD-MCNC: 8.8 G/DL (ref 14–18)
INHALED O2 CONCENTRATION: 28 %
LPM (OHS): 2
MAGNESIUM SERPL-MCNC: 2.5 MG/DL (ref 1.6–2.6)
MCH RBC QN AUTO: 28.4 PG (ref 27–31)
MCHC RBC AUTO-ENTMCNC: 30.6 G/DL (ref 33–36)
MCV RBC AUTO: 92.9 FL (ref 80–94)
METHGB MFR BLDA: 0.6 % (ref 0.4–1.5)
NRBC BLD AUTO-RTO: 0 %
O2 HB BLOOD GAS (OHS): 94.2 % (ref 94–97)
OXYGEN DEVICE BLOOD GAS (OHS): ABNORMAL
OXYHGB MFR BLDA: 9.1 G/DL (ref 12–16)
PCO2 BLDA: 40 MMHG (ref 35–45)
PH BLDA: 7.35 [PH] (ref 7.35–7.45)
PHOSPHATE SERPL-MCNC: 2.2 MG/DL (ref 2.3–4.7)
PLATELET # BLD AUTO: 150 X10(3)/MCL (ref 130–400)
PMV BLD AUTO: 10.3 FL (ref 7.4–10.4)
PO2 BLDA: 70 MMHG (ref 80–100)
POCT GLUCOSE: 137 MG/DL (ref 70–110)
POCT GLUCOSE: 138 MG/DL (ref 70–110)
POCT GLUCOSE: 138 MG/DL (ref 70–110)
POCT GLUCOSE: 141 MG/DL (ref 70–110)
POCT GLUCOSE: 152 MG/DL (ref 70–110)
POCT GLUCOSE: 154 MG/DL (ref 70–110)
POCT GLUCOSE: 154 MG/DL (ref 70–110)
POCT GLUCOSE: 162 MG/DL (ref 70–110)
POCT GLUCOSE: 163 MG/DL (ref 70–110)
POCT GLUCOSE: 165 MG/DL (ref 70–110)
POCT GLUCOSE: 176 MG/DL (ref 70–110)
POCT GLUCOSE: 180 MG/DL (ref 70–110)
POCT GLUCOSE: 184 MG/DL (ref 70–110)
POCT GLUCOSE: 191 MG/DL (ref 70–110)
POTASSIUM BLOOD GAS (OHS): 3.7 MMOL/L (ref 3.5–5)
POTASSIUM SERPL-SCNC: 3.7 MMOL/L (ref 3.5–5.1)
PROT SERPL-MCNC: 5.7 GM/DL (ref 5.8–7.6)
PSYCHE PATHOLOGY RESULT: NORMAL
RBC # BLD AUTO: 3.1 X10(6)/MCL (ref 4.7–6.1)
SAMPLE SITE BLOOD GAS (OHS): ABNORMAL
SAO2 % BLDA: 92.9 %
SODIUM BLOOD GAS (OHS): 138 MMOL/L (ref 137–145)
SODIUM SERPL-SCNC: 140 MMOL/L (ref 136–145)
WBC # SPEC AUTO: 11.37 X10(3)/MCL (ref 4.5–11.5)

## 2024-02-23 PROCEDURE — 82803 BLOOD GASES ANY COMBINATION: CPT

## 2024-02-23 PROCEDURE — 94761 N-INVAS EAR/PLS OXIMETRY MLT: CPT | Mod: XB

## 2024-02-23 PROCEDURE — 85027 COMPLETE CBC AUTOMATED: CPT

## 2024-02-23 PROCEDURE — 99024 POSTOP FOLLOW-UP VISIT: CPT | Mod: ,,, | Performed by: PHYSICIAN ASSISTANT

## 2024-02-23 PROCEDURE — 63600175 PHARM REV CODE 636 W HCPCS

## 2024-02-23 PROCEDURE — 97162 PT EVAL MOD COMPLEX 30 MIN: CPT

## 2024-02-23 PROCEDURE — 83735 ASSAY OF MAGNESIUM: CPT

## 2024-02-23 PROCEDURE — 20000000 HC ICU ROOM

## 2024-02-23 PROCEDURE — 37799 UNLISTED PX VASCULAR SURGERY: CPT

## 2024-02-23 PROCEDURE — 63600175 PHARM REV CODE 636 W HCPCS: Performed by: THORACIC SURGERY (CARDIOTHORACIC VASCULAR SURGERY)

## 2024-02-23 PROCEDURE — S5010 5% DEXTROSE AND 0.45% SALINE: HCPCS

## 2024-02-23 PROCEDURE — P9045 ALBUMIN (HUMAN), 5%, 250 ML: HCPCS | Mod: JZ,JG

## 2024-02-23 PROCEDURE — 25000003 PHARM REV CODE 250

## 2024-02-23 PROCEDURE — 27000221 HC OXYGEN, UP TO 24 HOURS

## 2024-02-23 PROCEDURE — 97166 OT EVAL MOD COMPLEX 45 MIN: CPT

## 2024-02-23 PROCEDURE — 99222 1ST HOSP IP/OBS MODERATE 55: CPT | Mod: ,,, | Performed by: INTERNAL MEDICINE

## 2024-02-23 PROCEDURE — 84100 ASSAY OF PHOSPHORUS: CPT

## 2024-02-23 PROCEDURE — 80053 COMPREHEN METABOLIC PANEL: CPT

## 2024-02-23 PROCEDURE — 99900035 HC TECH TIME PER 15 MIN (STAT)

## 2024-02-23 RX ORDER — IBUPROFEN 200 MG
24 TABLET ORAL
Status: DISCONTINUED | OUTPATIENT
Start: 2024-02-23 | End: 2024-03-01 | Stop reason: HOSPADM

## 2024-02-23 RX ORDER — ACETAMINOPHEN AND CODEINE PHOSPHATE 300; 30 MG/1; MG/1
1 TABLET ORAL EVERY 6 HOURS PRN
Status: DISCONTINUED | OUTPATIENT
Start: 2024-02-23 | End: 2024-03-01 | Stop reason: HOSPADM

## 2024-02-23 RX ORDER — IBUPROFEN 200 MG
16 TABLET ORAL
Status: DISCONTINUED | OUTPATIENT
Start: 2024-02-23 | End: 2024-03-01 | Stop reason: HOSPADM

## 2024-02-23 RX ORDER — HYDRALAZINE HYDROCHLORIDE 20 MG/ML
10 INJECTION INTRAMUSCULAR; INTRAVENOUS
Status: DISCONTINUED | OUTPATIENT
Start: 2024-02-23 | End: 2024-03-01 | Stop reason: HOSPADM

## 2024-02-23 RX ORDER — GLUCAGON 1 MG
1 KIT INJECTION
Status: DISCONTINUED | OUTPATIENT
Start: 2024-02-23 | End: 2024-03-01 | Stop reason: HOSPADM

## 2024-02-23 RX ORDER — INSULIN ASPART 100 [IU]/ML
0-10 INJECTION, SOLUTION INTRAVENOUS; SUBCUTANEOUS
Status: DISCONTINUED | OUTPATIENT
Start: 2024-02-23 | End: 2024-03-01 | Stop reason: HOSPADM

## 2024-02-23 RX ADMIN — CEFAZOLIN SODIUM 2 G: 2 SOLUTION INTRAVENOUS at 08:02

## 2024-02-23 RX ADMIN — FOLIC ACID 1 MG: 1 TABLET ORAL at 08:02

## 2024-02-23 RX ADMIN — SUCRALFATE 1 G: 1 TABLET ORAL at 10:02

## 2024-02-23 RX ADMIN — MUPIROCIN: 20 OINTMENT TOPICAL at 08:02

## 2024-02-23 RX ADMIN — ONDANSETRON 4 MG: 2 INJECTION INTRAMUSCULAR; INTRAVENOUS at 12:02

## 2024-02-23 RX ADMIN — ASPIRIN 81 MG: 81 TABLET, COATED ORAL at 08:02

## 2024-02-23 RX ADMIN — ACETAMINOPHEN 1000 MG: 10 INJECTION, SOLUTION INTRAVENOUS at 02:02

## 2024-02-23 RX ADMIN — SUCRALFATE 1 G: 1 TABLET ORAL at 08:02

## 2024-02-23 RX ADMIN — DOCUSATE SODIUM 100 MG: 100 CAPSULE, LIQUID FILLED ORAL at 08:02

## 2024-02-23 RX ADMIN — ACETAMINOPHEN 1000 MG: 10 INJECTION, SOLUTION INTRAVENOUS at 05:02

## 2024-02-23 RX ADMIN — ENOXAPARIN SODIUM 40 MG: 40 INJECTION SUBCUTANEOUS at 04:02

## 2024-02-23 RX ADMIN — ALBUMIN (HUMAN) 12.5 G: 2.5 SOLUTION INTRAVENOUS at 06:02

## 2024-02-23 RX ADMIN — OXYCODONE HYDROCHLORIDE 10 MG: 10 TABLET ORAL at 05:02

## 2024-02-23 RX ADMIN — DEXTROSE AND SODIUM CHLORIDE: 5; 450 INJECTION, SOLUTION INTRAVENOUS at 10:02

## 2024-02-23 RX ADMIN — INSULIN HUMAN 4.6 UNITS/HR: 1 INJECTION, SOLUTION INTRAVENOUS at 04:02

## 2024-02-23 RX ADMIN — INSULIN ASPART 2 UNITS: 100 INJECTION, SOLUTION INTRAVENOUS; SUBCUTANEOUS at 10:02

## 2024-02-23 RX ADMIN — POTASSIUM CHLORIDE 20 MEQ: 14.9 INJECTION, SOLUTION INTRAVENOUS at 06:02

## 2024-02-23 RX ADMIN — FAMOTIDINE 20 MG: 10 INJECTION, SOLUTION INTRAVENOUS at 08:02

## 2024-02-23 RX ADMIN — EPINEPHRINE 0.06 MCG/KG/MIN: 1 INJECTION INTRAMUSCULAR; INTRAVENOUS; SUBCUTANEOUS at 04:02

## 2024-02-23 RX ADMIN — ONDANSETRON 4 MG: 2 INJECTION INTRAMUSCULAR; INTRAVENOUS at 05:02

## 2024-02-23 RX ADMIN — METOPROLOL TARTRATE 12.5 MG: 25 TABLET, FILM COATED ORAL at 08:02

## 2024-02-23 NOTE — PLAN OF CARE
Problem: Physical Therapy  Goal: Physical Therapy Goal  Description: Goals to be met by: 3/23/24     Patient will increase functional independence with mobility by performin. Supine to sit with MInimal Assistance  2. Sit to supine with MInimal Assistance  3. Sit to stand transfer with Minimal Assistance  4. Gait  x 150 feet with Minimal Assistance using Rolling Walker.     Outcome: Ongoing, Progressing

## 2024-02-23 NOTE — NURSING
Nurses Note -- 4 Eyes      2/23/2024   6:40 AM      Skin assessed during: Daily Assessment      [] No Altered Skin Integrity Present    []Prevention Measures Documented      [] Yes- Altered Skin Integrity Present or Discovered   [] LDA Added if Not in Epic (Describe Wound)   [] New Altered Skin Integrity was Present on Admit and Documented in LDA   [] Wound Image Taken    Wound Care Consulted? No    Attending Nurse:  VANESSA Cervantes     Second RN/Staff Member:  VANESSA Dhillon

## 2024-02-23 NOTE — PROGRESS NOTES
[facility] - [facility] ICU  Pulmonary Critical Care Note    Patient Name: Rustam Matthews  MRN: [id number]  Admission Date: 2/22/2024  Hospital Length of Stay: 1 days  Code Status: Full Code  Attending Provider: Silvana Bauman MD  Primary Care Provider: Vasquez Rodriguez MD     Subjective:     HPI:   Rustam Matthews is a 63 year old male with PMH of  PAD, HTN, DM, CKD 3, chronic ENOCH, recurrent GI bleed (followed by GI and hematology). He underwent LHC with CIS 12/13/23 and was found to have severe multivessel CAD (prox LAD, mid to distal left circumflex, mid RCA) and was thus referred to CT surgery for CABG. He was admitted to [facility] 2/22/24 and underwent CABG x3 left internal mammary artery to LAD, saphenous venous graft to OM1, Om1 endarterectomy, saphenous venous graft to PDA, ligation of left atrial appendage. with CV surgery. He was admitted to ICU postoperatively    Hospital Course/Significant events:  2/22/24: admit to ICU s/p CABG    24 Hour Interval History:  NAEON. Extubated to nasal cannula yesterday without issue. He is still requiring epi gtt. Sitting in chair, reports post op pain.    Past Medical History:   Diagnosis Date    Abnormal cardiovascular stress test     Acute anemia     Angiectasia     Angina pectoris     Arthritis     Atherosclerosis of native arteries of extremities with intermittent claudication, unspecified extremity     Back pain     Coronary artery disease     Coronary artery disease, unspecified vessel or lesion type, unspecified whether angina present, unspecified whether native or transplanted heart     Diabetes mellitus     Heart disease     Hypertension     Obesity     Peripheral artery disease     Renal disorder     kidney disease Stage IV    SOB (shortness of breath)     Weakness of both legs        Past Surgical History:   Procedure Laterality Date    COLONOSCOPY      CORONARY ANGIOGRAPHY Right 01/04/2024    Procedure: ANGIOGRAM, CORONARY ARTERY;  Surgeon: Ruben Bee  "MD KATERINE;  Location: [facility] CATH LAB;  Service: Cardiology;  Laterality: Right;    CORONARY ARTERY BYPASS GRAFT (CABG) N/A 2/22/2024    Procedure: CORONARY ARTERY BYPASS GRAFT (CABG);  Surgeon: Silvana Bauman MD;  Location: [facility] OR;  Service: Cardiovascular;  Laterality: N/A;    ENDARTERECTOMY N/A 2/22/2024    Procedure: ENDARTERECTOMY;  Surgeon: Silvana Bauman MD;  Location: [facility] OR;  Service: Cardiovascular;  Laterality: N/A;  CORONARY ENDARTERECTOMY    ENDOSCOPIC HARVEST OF VEIN N/A 2/22/2024    Procedure: SURGICAL PROCUREMENT, VEIN, ENDOSCOPIC;  Surgeon: Silvana Bauman MD;  Location: [facility] OR;  Service: Cardiovascular;  Laterality: N/A;    EXCLUSION OF LEFT ATRIAL APPENDAGE N/A 2/22/2024    Procedure: EXCLUSION, LEFT ATRIAL APPENDAGE;  Surgeon: Silvana Bauman MD;  Location: [facility] OR;  Service: Cardiovascular;  Laterality: N/A;    LEFT HEART CATHETERIZATION Right 01/04/2024    Procedure: Left heart cath;  Surgeon: Ruben Bee MD;  Location: [facility] CATH LAB;  Service: Cardiology;  Laterality: Right;  DIAGNOSTIC PERIPHERAL    PERIPHERAL ANGIOGRAPHY      PLACEMENT-STENT      pt states "in one of my legs"    UPPER GASTROINTESTINAL ENDOSCOPY         Social History     Socioeconomic History    Marital status: Single   Tobacco Use    Smoking status: Some Days     Current packs/day: 0.00     Types: Cigarettes, Vaping with nicotine     Last attempt to quit: 2/1/2021     Years since quitting: 3.0    Smokeless tobacco: Never    Tobacco comments:     Quit smoking 2 years ago-only vapes some days    Substance and Sexual Activity    Alcohol use: Never    Drug use: Never    Sexual activity: Not Currently     Partners: Female     Birth control/protection: None     Social Determinants of Health     Financial Resource Strain: Low Risk  (6/1/2023)    Overall Financial Resource Strain (CARDIA)     Difficulty of Paying Living Expenses: Not hard at all   Food Insecurity: No Food Insecurity (6/1/2023)    Hunger Vital Sign     " Worried About Running Out of Food in the Last Year: Never true     Ran Out of Food in the Last Year: Never true   Transportation Needs: No Transportation Needs (6/1/2023)    PRAPARE - Transportation     Lack of Transportation (Medical): No     Lack of Transportation (Non-Medical): No   Physical Activity: Inactive (6/1/2023)    Exercise Vital Sign     Days of Exercise per Week: 0 days     Minutes of Exercise per Session: 0 min   Stress: No Stress Concern Present (6/1/2023)    [employer] - Occupational Stress Questionnaire     Feeling of Stress : Not at all   Social Connections: Socially Isolated (6/1/2023)    Social Connection and Isolation Panel [NHANES]     Frequency of Communication with Friends and Family: Twice a week     Frequency of Social Gatherings with Friends and Family: Never     Attends Zoroastrian Services: 1 to 4 times per year     Active Member of Clubs or Organizations: No     Attends Club or Organization Meetings: Never     Marital Status:    Housing Stability: Low Risk  (6/1/2023)    Housing Stability Vital Sign     Unable to Pay for Housing in the Last Year: No     Number of Places Lived in the Last Year: 1     Unstable Housing in the Last Year: No           Current Outpatient Medications   Medication Instructions    acyclovir (ZOVIRAX) 400 mg, Oral, As needed (PRN)    ALPRAZolam (XANAX) 1 mg, Oral, 2 times daily    amitriptyline (ELAVIL) 50 mg, Oral, Nightly    amLODIPine (NORVASC) 10 mg, Oral    aspirin (ECOTRIN) 81 mg, Oral, Daily    atenoloL (TENORMIN) 50 mg, Oral, Daily    calcitRIOL (ROCALTROL) 0.5 mcg, Oral, Four times weekly    cilostazoL (PLETAL) 50 mg, Oral, 2 times daily    clopidogreL (PLAVIX) 75 mg, Oral, Daily    furosemide (LASIX) 20 mg, Oral, Daily, Take 2 tablets by mouth once a day as needed for swelling    glimepiride (AMARYL) 4 mg, Oral, Daily    KLOR-CON 10 10 mEq TbSR 10 mEq, Oral, Daily    metoprolol succinate (TOPROL-XL) 100 mg, Oral,  Daily    nitroGLYCERIN (NITROSTAT) 0.4 MG SL tablet Sublingual    olmesartan-hydrochlorothiazide (BENICAR HCT) 40-25 mg per tablet 1 tablet, Oral, Daily    omeprazole (PRILOSEC) 40 mg, Oral, Daily    ranolazine (RANEXA) 500 mg, Oral, 2 times daily    rosuvastatin (CRESTOR) 40 mg, Oral, Nightly    sildenafiL (VIAGRA) 100 mg, Oral, Daily PRN    sodium bicarbonate 650 mg, Oral, 2 times daily       Current Inpatient Medications   acetaminophen  1,000 mg Intravenous Q6H    aspirin  81 mg Oral Daily    docusate sodium  100 mg Oral BID    enoxparin  40 mg Subcutaneous Daily    famotidine (PF)  20 mg Intravenous Daily    folic acid  1 mg Oral Daily    metoprolol tartrate  12.5 mg Oral BID    mupirocin   Nasal BID    sucralfate  1 g Oral QID (AC & HS)       Current Intravenous Infusions   dexmedeTOMIDine (Precedex) infusion (titrating) Stopped (02/22/24 1310)    dextrose 5 % and 0.45 % NaCl 100 mL/hr at 02/23/24 1009    EPINEPHrine 0.035 mcg/kg/min (02/23/24 0812)    insulin regular 1 units/mL infusion orderable (CTS POST-OP) 4.6 Units/hr (02/23/24 0812)    loperamide           Review of Systems   Unable to perform ROS: Intubated          Objective:       Intake/Output Summary (Last 24 hours) at 2/23/2024 1121  Last data filed at 2/23/2024 1100  Gross per 24 hour   Intake 5300.11 ml   Output 2070 ml   Net 3230.11 ml           Vital Signs (Most Recent):  Temp: 98.9 °F (37.2 °C) (02/23/24 0800)  Pulse: 80 (02/23/24 1000)  Resp: 19 (02/23/24 1000)  BP: (!) 118/57 (02/23/24 1000)  SpO2: 98 % (02/23/24 1000)  Body mass index is 36.21 kg/m².  Weight: 131.4 kg (289 lb 11 oz) Vital Signs (24h Range):  Temp:  [98.1 °F (36.7 °C)-98.9 °F (37.2 °C)] 98.9 °F (37.2 °C)  Pulse:  [58-94] 80  Resp:  [8-26] 19  SpO2:  [17 %-100 %] 98 %  BP: ()/(32-92) 118/57     Physical Exam  Vitals and nursing note reviewed.   Constitutional:       General: He is not in acute distress.     Comments: Intubated and sedated    HENT:      Head:  Normocephalic.      Right Ear: External ear normal.      Left Ear: External ear normal.      Nose: Nose normal.      Mouth/Throat:      Mouth: Mucous membranes are moist.   Eyes:      Conjunctiva/sclera: Conjunctivae normal.      Pupils: Pupils are equal, round, and reactive to light.   Cardiovascular:      Rate and Rhythm: Normal rate and regular rhythm.      Heart sounds: No murmur heard.  Pulmonary:      Effort: No respiratory distress.      Breath sounds: No wheezing or rhonchi.   Abdominal:      General: There is no distension.      Palpations: Abdomen is soft.      Tenderness: There is no abdominal tenderness. There is no guarding.   Musculoskeletal:         General: No swelling or deformity.   Skin:     General: Skin is warm and dry.      Capillary Refill: Capillary refill takes less than 2 seconds.   Neurological:      Comments: Exam limited 2/2 intubation and sedation           Lines/Drains/Airways       Central Venous Catheter Line  Duration             Percutaneous Central Line Insertion/Assessment - Triple Lumen  02/22/24 1012 Internal Jugular Right 1 day              Drain  Duration                  Chest Tube 02/22/24 Tube - 1 Anterior Mediastinal 28 Fr. 1 day         Chest Tube 02/22/24 Tube - 2 Left Pleural 28 Fr. 1 day         Urethral Catheter 02/22/24 0655 Temperature probe 16 Fr. 1 day              Arterial Line  Duration             Arterial Line 02/22/24 1100 Left Radial 1 day              Peripheral Intravenous Line  Duration                  Peripheral IV - Single Lumen 02/22/24 0544 18 G Anterior;Right Forearm 1 day                    Significant Labs:    Lab Results   Component Value Date    WBC 11.37 02/23/2024    HGB 8.8 (L) 02/23/2024    HCT 28.8 (L) 02/23/2024    MCV 92.9 02/23/2024     02/23/2024         BMP  Lab Results   Component Value Date     02/23/2024    K 3.7 02/23/2024    CO2 21 (L) 02/23/2024    BUN 19.3 02/23/2024    CREATININE 2.75 (H) 02/23/2024    CALCIUM 8.3  (L) 02/23/2024       ABG  Recent Labs   Lab 02/22/24  1018 02/22/24  1150 02/23/24  0207   PH 7.255*   < > 7.350   PO2 233*   < > 70.0*   PCO2 55.0*   < > 40.0   HCO3 24.4   < > 22.1   BE -3*  --   --     < > = values in this interval not displayed.         Mechanical Ventilation Support:  Vent Mode: CPAP PSV (02/22/24 1438)  Set Rate: 10 BPM (02/22/24 1327)  Vt Set: 500 mL (02/22/24 1327)  Pressure Support: 10 cmH20 (02/22/24 1438)  PEEP/CPAP: 5 cmH20 (02/22/24 1438)  Oxygen Concentration (%): 2 (02/23/24 0600)  Peak Airway Pressure: 15 cmH20 (02/22/24 1438)  Total Ve: 13.8 L/m (02/22/24 1438)  F/VT Ratio<105 (RSBI): (!) 23.51 (02/22/24 1438)    Significant Imaging:  I have reviewed the pertinent imaging within the past 24 hours.        Assessment/Plan:     Assessment  Severe multivessel CAD s/p CABG x3 2/22/24  PAD  HTN  DM  History of chronic ENOCH and recurrent GI bleed (followed by GI/ Hematology)      Plan  Continue with post op CABG protocol  Extubated without issue, wean NC as tolerated  Wean epinephrine as tolerated.  Chest tube management per CV surgery, monitor for increased output    DVT Prophylaxis: lovenox  GI Prophylaxis: famotidine     Greater than 30 minutes of critical care was time spent personally by me on the following activities: development of treatment plan with patient or surrogate and bedside caregivers, discussions with consultants, evaluation of patient's response to treatment, examination of patient, ordering and performing treatments and interventions, ordering and review of laboratory studies, ordering and review of radiographic studies, pulse oximetry, re-evaluation of patient's condition.  This critical care time did not overlap with that of any other provider or involve time for any procedures.     Hermelinda Gómez MD  Pulmonary Critical Care Medicine  [facility] - [facility] ICU

## 2024-02-23 NOTE — PROGRESS NOTES
Pod 1  In chair, on epi - l hand tremors  Some pain  Vss  Heart sinus  Wounds c/d/I  Hct 28  Cr 2.75  Cts draining  Volume, ambulate

## 2024-02-23 NOTE — PLAN OF CARE
Problem: Adult Inpatient Plan of Care  Goal: Plan of Care Review  Outcome: Ongoing, Progressing  Goal: Patient-Specific Goal (Individualized)  Outcome: Ongoing, Progressing  Goal: Absence of Hospital-Acquired Illness or Injury  Outcome: Ongoing, Progressing  Goal: Optimal Comfort and Wellbeing  Outcome: Ongoing, Progressing     Problem: Infection  Goal: Absence of Infection Signs and Symptoms  Outcome: Ongoing, Progressing     Problem: Fall Injury Risk  Goal: Absence of Fall and Fall-Related Injury  Outcome: Ongoing, Progressing     Problem: Adjustment to Surgery (Cardiovascular Surgery)  Goal: Optimal Coping with Heart Surgery  Outcome: Ongoing, Progressing     Problem: Bleeding (Cardiovascular Surgery)  Goal: Bleeding (Cardiovascular Surgery)  Outcome: Ongoing, Progressing     Problem: Cardiac Function Impaired (Cardiovascular Surgery)  Goal: Effective Cardiac Function  Outcome: Ongoing, Progressing     Problem: Cerebral Tissue Perfusion (Cardiovascular Surgery)  Goal: Optimal Cerebral Tissue Perfusion (Cardiovascular Surgery)  Outcome: Ongoing, Progressing     Problem: Fluid and Electrolyte Imbalance (Cardiovascular Surgery)  Goal: Fluid and Electrolyte Balance (Cardiovascular Surgery)  Outcome: Ongoing, Progressing     Problem: Glycemic Control Impaired (Cardiovascular Surgery)  Goal: Blood Glucose Level Within Targeted Range (Cardiovascular Surgery)  Outcome: Ongoing, Progressing     Problem: Infection (Cardiovascular Surgery)  Goal: Absence of Infection Signs and Symptoms  Outcome: Ongoing, Progressing     Problem: Ongoing Anesthesia Effects (Cardiovascular Surgery)  Goal: Anesthesia/Sedation Recovery  Outcome: Ongoing, Progressing     Problem: Pain (Cardiovascular Surgery)  Goal: Acceptable Pain Control  Outcome: Ongoing, Progressing     Problem: Postoperative Nausea and Vomiting (Cardiovascular Surgery)  Goal: Nausea and Vomiting Relief (Cardiovascular Surgery)  Outcome: Ongoing, Progressing     Problem:  Respiratory Compromise (Cardiovascular Surgery)  Goal: Effective Oxygenation and Ventilation (Cardiovascular Surgery)  Outcome: Ongoing, Progressing

## 2024-02-23 NOTE — PLAN OF CARE
Problem: Occupational Therapy  Goal: Occupational Therapy Goal  Description: Goals to be met by: 2/23/24     Patient will increase functional independence with ADLs by performing:    UE Dressing with Minimal Assistance.  LE Dressing with Minimal Assistance.  Toileting from toilet with Minimal Assistance for hygiene and clothing management.   Toilet transfer to toilet with Minimal Assistance.    Outcome: Ongoing, Progressing

## 2024-02-23 NOTE — PT/OT/SLP EVAL
"Occupational Therapy  Evaluation    Name: Rustam Matthews  MRN: [id number]  Admitting Diagnosis: severe CAD  Recent Surgery: Procedure(s) (LRB):  CORONARY ARTERY BYPASS GRAFT (CABG) (N/A)  SURGICAL PROCUREMENT, VEIN, ENDOSCOPIC (N/A)  EXCLUSION, LEFT ATRIAL APPENDAGE (N/A)  ENDARTERECTOMY (N/A) 1 Day Post-Op    Recommendations:     Discharge therapy intensity: Moderate Intensity Therapy   Discharge Equipment Recommendations:  to be determined by next level of care    Assessment:     Rustam Matthews is a 63 y.o. male with a medical diagnosis of severe CAD s/p CABG, hx of PAD, htn, DM, stage 4 CKD.  Pt required increased time to arouse and engage.  Pt with limited active participation in sit<>stand, continuously stating he was "too weak".  OT attempted to motivate and encourage him to try his hardest.  Pt nauseated after transitioning from chair to EOB.  RN present intermittently throughout.  Hopefully motivation and overall strength and endurance will continue to improve. Expect mod intensity therapy upon discharge.    He presents with the following performance deficits affecting function: weakness, impaired endurance, impaired self care skills, impaired functional mobility, gait instability, impaired balance, decreased upper extremity function, decreased lower extremity function, pain, impaired cardiopulmonary response to activity.     Rehab Prognosis: Good; patient would benefit from acute skilled OT services to address these deficits and reach maximum level of function.       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      Patient was found up in chair with  (sternal wound vac, chest tube x2, pryor, vital monitoring) upon OT entry to room.    General Precautions: Standard, sternal, fall  Orthopedic Precautions: N/A  Braces: N/A    Vital Signs: 115/55 O2 saturation 100% 3 L NC    Bed Mobility:    Patient completed Sit to Supine with total assistance    Functional Mobility/Transfers:  Patient completed Bed <> Chair Transfer using Step Transfer technique with minimum assistance with rolling walker  Functional Mobility: Pt with limited effort on first attempt for sit <>stand.  Male brought in maxx3 for sit to stand on second attempt with improved effort.  Pt required min assist to take steps to bed.  Maxx2 for sit>supine.  Cues to reduce pace of breathing    Activities of Daily Living:  Grooming: min A  Lower Body Dressing: maximal assistance   Toileting: total assistance pryor  Unable to ambulate to toilet on day of eval    Functional Cognition:  Intact  Orientation: oriented to Person, Place, Time, and Situation  Lethargic, limited active participation initially.    Became more awake but perhaps effort was limited until a male was brought in.  Overall, poor tolerance    Visual Perceptual Skills:  Intact    Upper Extremity Function:  Right Upper Extremity:   3/5, effort related? Vs true weakness?    Left Upper Extremity:  3/5, effort related? Vs true weakness?    Balance:   SBA static sitting balance    Therapeutic Positioning  Risk for acquired pressure injuries is increased due to relative decrease in mobility d/t hospitalization   and impaired mobility.    OT interventions performed during the course of today's session:   Education was provided on benefits of and recommendations for therapeutic positioning    Skin assessment: all bony prominences were assessed    Findings: no redness or breakdown noted with exception of heels, will continue to assess    OT recommendations for therapeutic positioning throughout hospitalization:   Follow [facility] Pressure Injury Prevention Protocol      Will order pup and geomat      Patient Education:  Patient provided with verbal education education regarding OT role/goals/POC, post op precautions, and fall prevention.  Understanding was verbalized, however additional teaching warranted.     Patient left HOB elevated with all lines intact, call button in reach, and RN aware of status and monitoring pt    GOALS:   Multidisciplinary Problems       Occupational Therapy Goals          Problem: Occupational Therapy    Goal Priority Disciplines Outcome Interventions   Occupational Therapy Goal     OT, PT/OT Ongoing, Progressing    Description: Goals to be met by: 2/23/24     Patient will increase functional independence with ADLs by performing:    UE Dressing with Minimal Assistance.  LE Dressing with Minimal Assistance.  Toileting from toilet with Minimal Assistance for hygiene and clothing management.   Toilet transfer to toilet with Minimal Assistance.                         History:     Past Medical History:   Diagnosis Date    Abnormal cardiovascular stress test     Acute anemia     Angiectasia     Angina pectoris     Arthritis     Atherosclerosis of native arteries of extremities with intermittent claudication, unspecified extremity     Back pain     Coronary artery disease     Coronary artery disease, unspecified vessel or lesion type, unspecified whether angina present, unspecified whether native or transplanted heart     Diabetes mellitus     Heart disease     Hypertension     Obesity     Peripheral artery  "disease     Renal disorder     kidney disease Stage IV    SOB (shortness of breath)     Weakness of both legs          Past Surgical History:   Procedure Laterality Date    COLONOSCOPY      CORONARY ANGIOGRAPHY Right 01/04/2024    Procedure: ANGIOGRAM, CORONARY ARTERY;  Surgeon: Ruben Bee MD;  Location: [facility] CATH LAB;  Service: Cardiology;  Laterality: Right;    CORONARY ARTERY BYPASS GRAFT (CABG) N/A 2/22/2024    Procedure: CORONARY ARTERY BYPASS GRAFT (CABG);  Surgeon: Silvana Bauman MD;  Location: [facility] OR;  Service: Cardiovascular;  Laterality: N/A;    ENDARTERECTOMY N/A 2/22/2024    Procedure: ENDARTERECTOMY;  Surgeon: Silvana Bauman MD;  Location: [facility] OR;  Service: Cardiovascular;  Laterality: N/A;  CORONARY ENDARTERECTOMY    ENDOSCOPIC HARVEST OF VEIN N/A 2/22/2024    Procedure: SURGICAL PROCUREMENT, VEIN, ENDOSCOPIC;  Surgeon: Silvana Bauman MD;  Location: [facility] OR;  Service: Cardiovascular;  Laterality: N/A;    EXCLUSION OF LEFT ATRIAL APPENDAGE N/A 2/22/2024    Procedure: EXCLUSION, LEFT ATRIAL APPENDAGE;  Surgeon: Silvana Bauman MD;  Location: [facility] OR;  Service: Cardiovascular;  Laterality: N/A;    LEFT HEART CATHETERIZATION Right 01/04/2024    Procedure: Left heart cath;  Surgeon: Ruben Bee MD;  Location: [facility] CATH LAB;  Service: Cardiology;  Laterality: Right;  DIAGNOSTIC PERIPHERAL    PERIPHERAL ANGIOGRAPHY      PLACEMENT-STENT      pt states "in one of my legs"    UPPER GASTROINTESTINAL ENDOSCOPY         Time Tracking:     OT Date of Treatment:    OT Start Time: 1308  OT Stop Time: 1343  OT Total Time (min): 35 min    Billable Minutes:Evaluation MOD    2/23/2024   "

## 2024-02-23 NOTE — CARE UPDATE
[id number] Went to assess pt's dc needs. Pt is very drowsy at this time. He did tell me he lives with his son. Left a voice messaged for pt's son, Sea ([phone]).

## 2024-02-23 NOTE — PT/OT/SLP EVAL
Physical Therapy Evaluation    Patient Name:  Rustam Matthews   MRN:  [id number]    Recommendations:     Discharge therapy intensity: Moderate Intensity Therapy   Discharge Equipment Recommendations: to be determined by next level of care   Barriers to discharge:  medical dx, impaired mobility, decreased independence     Assessment:     Rustam Matthews is a 63 y.o. male admitted with a medical diagnosis of s/p CABGx3.  He presents with the following impairments/functional limitations: weakness, gait instability, impaired endurance, decreased lower extremity function, impaired balance, impaired self care skills, impaired functional mobility, pain, decreased upper extremity function, impaired cardiopulmonary response to activity. Patient with poor tolerance to PT eval. Pt sitting UIC upon arrival. Pt somewhat lethargic and with increased pain. Complaining of upper and lower body weakness. Max education and encouragement required. Educated on sternal pxns; verbalized understanding. Pt req maxA x3 for sit<>stand and Dustin for stand step transfer with use of RW. After t/f pt with increased secretions and then became nauseous; no actual vomiting episode. Will continue progressing as able; appears as if would benefit from placement beyond this stay.     Rehab Prognosis: Fair; patient would benefit from acute skilled PT services to address these deficits and reach maximum level of function.    Recent Surgery: Procedure(s) (LRB):  CORONARY ARTERY BYPASS GRAFT (CABG) (N/A)  SURGICAL PROCUREMENT, VEIN, ENDOSCOPIC (N/A)  EXCLUSION, LEFT ATRIAL APPENDAGE (N/A)  ENDARTERECTOMY (N/A) 1 Day Post-Op    Plan:     During this hospitalization, patient to be seen 5 x/week to address the identified rehab impairments via gait training, therapeutic activities, therapeutic exercises and progress toward the following goals:    Plan of Care Expires:  03/23/24    Subjective     Chief Complaint: increased pain, increased weakness   Patient/Family  Comments/goals: to get stronger   Pain/Comfort:  Pain Rating 1: 5/10  Location - Orientation 1: midline  Location 1: sternal    Patients cultural, spiritual, Latter day conflicts given the current situation: no    Living Environment:  Pt lives with son in a SL[facility]; 2 steps to enter/exit   Prior to admission, patients level of function was independent.    Equipment used/owned at home: cane, straight.    Upon discharge, patient will have assistance from unsure.    Objective:     Communicated with NSG prior to session.  Patient found up in chair with blood pressure cuff, pulse ox (continuous), telemetry, peripheral IV, chest tube, pryor catheter, arterial line  upon PT entry to room.    General Precautions: Standard, fall, sternal  Orthopedic Precautions:N/A   Braces: N/A  Respiratory Status: Nasal cannula, flow 3 L/min  Blood Pressure: 115/55  HR: 92  SpO2: 97%      Exams:  Cognitive Exam:  Patient is oriented to Person and Place  BLE Strength: poor participation in formal MMT; able to DF/PF; unable to march or kick leg against gravity   Skin integrity: Visible skin intact      Functional Mobility:  Bed Mobility:     Sit to Supine: total assistance  Transfers:     Sit to Stand:  maximal assistance and of 3 persons with rolling walker out in front and pt holding on to cardiac bear   Trial 1 unsuccessful; additional assistance obtained and then able to complete on trial 2  Posterior lean upon standing; req VC/TC to correct  Bed to Chair: minimum assistance with  rolling walker  using  Step Transfer      Treatment & Education:  Patient provided with verbal education  regarding PT role/goals/POC, post-op precautions, fall prevention, safety awareness, and discharge/DME recommendations.  Understanding was verbalized.     Patient left HOB elevated with all lines intact, call button in reach, and RN notified.    GOALS:   Multidisciplinary Problems       Physical Therapy Goals          Problem: Physical Therapy    Goal Priority  Disciplines Outcome Goal Variances Interventions   Physical Therapy Goal     PT, PT/OT Ongoing, Progressing     Description: Goals to be met by: 3/23/24     Patient will increase functional independence with mobility by performin. Supine to sit with MInimal Assistance  2. Sit to supine with MInimal Assistance  3. Sit to stand transfer with Minimal Assistance  4. Gait  x 150 feet with Minimal Assistance using Rolling Walker.                          History:     Past Medical History:   Diagnosis Date    Abnormal cardiovascular stress test     Acute anemia     Angiectasia     Angina pectoris     Arthritis     Atherosclerosis of native arteries of extremities with intermittent claudication, unspecified extremity     Back pain     Coronary artery disease     Coronary artery disease, unspecified vessel or lesion type, unspecified whether angina present, unspecified whether native or transplanted heart     Diabetes mellitus     Heart disease     Hypertension     Obesity     Peripheral artery disease     Renal disorder     kidney disease Stage IV    SOB (shortness of breath)     Weakness of both legs        Past Surgical History:   Procedure Laterality Date    COLONOSCOPY      CORONARY ANGIOGRAPHY Right 2024    Procedure: ANGIOGRAM, CORONARY ARTERY;  Surgeon: Ruben Bee MD;  Location: [facility] CATH LAB;  Service: Cardiology;  Laterality: Right;    CORONARY ARTERY BYPASS GRAFT (CABG) N/A 2024    Procedure: CORONARY ARTERY BYPASS GRAFT (CABG);  Surgeon: Silvana Bauman MD;  Location: [facility] OR;  Service: Cardiovascular;  Laterality: N/A;    ENDARTERECTOMY N/A 2024    Procedure: ENDARTERECTOMY;  Surgeon: Silvana Bauman MD;  Location: [facility] OR;  Service: Cardiovascular;  Laterality: N/A;  CORONARY ENDARTERECTOMY    ENDOSCOPIC HARVEST OF VEIN N/A 2024    Procedure: SURGICAL PROCUREMENT, VEIN, ENDOSCOPIC;  Surgeon: Silvana Bauman MD;  Location: [facility] OR;  Service: Cardiovascular;  Laterality:  "N/A;    EXCLUSION OF LEFT ATRIAL APPENDAGE N/A 2/22/2024    Procedure: EXCLUSION, LEFT ATRIAL APPENDAGE;  Surgeon: Silvana Bauman MD;  Location: [facility];  Service: Cardiovascular;  Laterality: N/A;    LEFT HEART CATHETERIZATION Right 01/04/2024    Procedure: Left heart cath;  Surgeon: Ruben Bee MD;  Location: [facility] CATH LAB;  Service: Cardiology;  Laterality: Right;  DIAGNOSTIC PERIPHERAL    PERIPHERAL ANGIOGRAPHY      PLACEMENT-STENT      pt states "in one of my legs"    UPPER GASTROINTESTINAL ENDOSCOPY         Time Tracking:     PT Received On: 02/23/24  PT Start Time: 1309     PT Stop Time: 1344  PT Total Time (min): 35 min     Billable Minutes: Evaluation mod      02/23/2024   "

## 2024-02-23 NOTE — NURSING
Nurses Note -- 4 Eyes      2/23/2024   5:22 PM      Skin assessed during: Q Shift Change      [] No Altered Skin Integrity Present    [x]Prevention Measures Documented      [] Yes- Altered Skin Integrity Present or Discovered   [x] LDA Added if Not in Epic (Describe Wound)   [] New Altered Skin Integrity was Present on Admit and Documented in LDA   [] Wound Image Taken    Wound Care Consulted? No    Attending Nurse:  Sylwia Badillo RN/Staff Member:   Tom Castaneda

## 2024-02-23 NOTE — PLAN OF CARE
02/23/24 1532   Discharge Assessment   Assessment Type Discharge Planning Assessment   Confirmed/corrected address, phone number and insurance Yes   Confirmed Demographics Correct on Facesheet   Source of Information family  (pt's son, Sea)   Communicated YAIMA with patient/caregiver Date not available/Unable to determine   Reason For Admission s/p CAB   People in Home child(samantha), adult  (Pt lives with his adult sonSea in a single story home with 3 steps to enter and no rails along the steps)   Do you expect to return to your current living situation? Yes   Do you have help at home or someone to help you manage your care at home? Yes   Who are your caregiver(s) and their phone number(s)? Pt's son works during the day, only at night   Prior to hospitilization cognitive status: Alert/Oriented   Current cognitive status: Unable to Assess  (pt is very drowsy)   Walking or Climbing Stairs Difficulty yes   Walking or Climbing Stairs ambulation difficulty, requires equipment   Mobility Management pt sometimes uses a cane   Dressing/Bathing Difficulty no   Home Layout Able to live on 1st floor   Equipment Currently Used at Home cane, straight   Readmission within 30 days? No   Patient currently being followed by outpatient case management? No   Do you currently have service(s) that help you manage your care at home? No   Do you take prescription medications? Yes   Do you have prescription coverage? Yes   Coverage [facility] dual   Who is going to help you get home at discharge? family   How do you get to doctors appointments? car, drives self   Are you on dialysis? No   Discharge Plan A Home Health   Discharge Plan B Home Health   DME Needed Upon Discharge  other (see comments)  (TBD)   Discharge Plan discussed with: Adult children   Transition of Care Barriers None   Housing Stability   In the last 12 months, was there a time when you were not able to pay the mortgage or rent on time? N   Transportation Needs   In the past  12 months, has lack of transportation kept you from medical appointments or from getting medications? no   Food Insecurity   Within the past 12 months, you worried that your food would run out before you got the money to buy more. Never true   OTHER   Name(s) of People in Home son, Sea     Pt's PCP is Dr Vasquez Rodriguez who is located in [place]. Pt's  is his son, Sea ([phone]). Pt does drive and is retired. Pt uses [facility] in [place]. Pt has never had HH services.  Rec consult for HH services. Spoke with pt's son re HH. He would like the CM to discuss his options with pt. Pt is still drowsy. CM to follow

## 2024-02-23 NOTE — CONSULTS
[facility] New Consult Note    Patient Name: Rustam Matthews  Admission Date: 2/22/2024  Hospital Length of Stay: 1 days  Code Status: Full Code   Attending Physician: Silvana Bauman MD   Primary Care Physician: Vasquez Rodriguez MD  Principal Problem:<principal problem not specified>    NEPHROLOGY ON CALL  HPI:    sue Matthews is a 63 year old male with PMH of PAD, HTN, DM, CKD 3, chronic ENOCH, recurrent GI bleed (followed by GI and hematology). He underwent LHC with CIS 12/13/23 and was found to have severe multivessel CAD (prox LAD, mid to distal left circumflex, mid RCA) and was thus referred to CT surgery for CABG. He was admitted to [facility] 2/22/24 and underwent CABG x3 left internal mammary artery to LAD, saphenous venous graft to OM1, Om1 endarterectomy, saphenous venous graft to PDA, ligation of left atrial appendage. with CV surgery. He was admitted to ICU postoperatively     Pt has [id number]  sees Dr Leon Olson   I see previous Cr up to 4-5 at times  but as of recently,before surgery, cr = 3.5 in January 2024    Currently Cr is 2.75  I am called for consultation for renal management  Pt complaining of pain at surgical site urine output aroung 1.5 liter/24  Has chest tube drainage also        Medical History:   Past Medical History:   Diagnosis Date    Abnormal cardiovascular stress test     Acute anemia     Angiectasia     Angina pectoris     Arthritis     Atherosclerosis of native arteries of extremities with intermittent claudication, unspecified extremity     Back pain     Coronary artery disease     Coronary artery disease, unspecified vessel or lesion type, unspecified whether angina present, unspecified whether native or transplanted heart     Diabetes mellitus     Heart disease     Hypertension     Obesity     Peripheral artery disease     Renal disorder     kidney disease Stage IV    SOB (shortness of breath)     Weakness of both legs        Surgical History:   Past Surgical History:   Procedure  "Laterality Date    COLONOSCOPY      CORONARY ANGIOGRAPHY Right 01/04/2024    Procedure: ANGIOGRAM, CORONARY ARTERY;  Surgeon: Ruben Bee MD;  Location: [facility] CATH LAB;  Service: Cardiology;  Laterality: Right;    CORONARY ARTERY BYPASS GRAFT (CABG) N/A 2/22/2024    Procedure: CORONARY ARTERY BYPASS GRAFT (CABG);  Surgeon: Silvana Bauman MD;  Location: [facility] OR;  Service: Cardiovascular;  Laterality: N/A;    ENDARTERECTOMY N/A 2/22/2024    Procedure: ENDARTERECTOMY;  Surgeon: Silvana Bauman MD;  Location: [facility] OR;  Service: Cardiovascular;  Laterality: N/A;  CORONARY ENDARTERECTOMY    ENDOSCOPIC HARVEST OF VEIN N/A 2/22/2024    Procedure: SURGICAL PROCUREMENT, VEIN, ENDOSCOPIC;  Surgeon: Silvana Bauman MD;  Location: [facility] OR;  Service: Cardiovascular;  Laterality: N/A;    EXCLUSION OF LEFT ATRIAL APPENDAGE N/A 2/22/2024    Procedure: EXCLUSION, LEFT ATRIAL APPENDAGE;  Surgeon: Silvana Bauman MD;  Location: [facility] OR;  Service: Cardiovascular;  Laterality: N/A;    LEFT HEART CATHETERIZATION Right 01/04/2024    Procedure: Left heart cath;  Surgeon: Ruben Bee MD;  Location: [facility] CATH LAB;  Service: Cardiology;  Laterality: Right;  DIAGNOSTIC PERIPHERAL    PERIPHERAL ANGIOGRAPHY      PLACEMENT-STENT      pt states "in one of my legs"    UPPER GASTROINTESTINAL ENDOSCOPY         Family History:   History reviewed. No pertinent family history..     Social History:   Social History     Tobacco Use    Smoking status: Some Days     Current packs/day: 0.00     Types: Cigarettes, Vaping with nicotine     Last attempt to quit: 2/1/2021     Years since quitting: 3.0    Smokeless tobacco: Never    Tobacco comments:     Quit smoking 2 years ago-only vapes some days    Substance Use Topics    Alcohol use: Never       Allergies:  Review of patient's allergies indicates:   Allergen Reactions    Allergenic extract-food-shrimp Rash     All seafood      Lactose Rash    Mayonnaise Rash         Review of " Systems:  Constitutional: occ confusion, pain at surgical site  Skin: ++chest wall wound  EENT: Denies acute hearing/vision changes, tinnitus, rhinorrhea or dysphagia  Respiratory:  no worsening dyspnea  Cardiovascular: pain at surgical site  Gastrointestional: Denies abdominal pain, nausea, vomiting,   Genitourinary: ?dec urine output  Neurological: intermittent confusion ?pain meds  Hematological: Denies unusual bruising or bleeding    Medications:    Current Facility-Administered Medications:     acetaminophen 1,000 mg/100 mL (10 mg/mL) injection 1,000 mg, 1,000 mg, Intravenous, Q6H, Silvana Bauman MD, Stopped at 02/23/24 0528    acetaminophen oral solution 650 mg, 650 mg, Per OG tube, Q6H PRN, Yocham, Senait, FNP    acetaminophen-codeine 300-30mg per tablet 1 tablet, 1 tablet, Oral, Q6H PRN, Yocham, Senait, FNP    aspirin EC tablet 81 mg, 81 mg, Oral, Daily, Yocham, Senait, FNP, 81 mg at 02/23/24 0807    calcium gluconate 1 g in NS IVPB (premixed), 1 g, Intravenous, PRN, Yocham, Senait, FNP    calcium gluconate 1 g in NS IVPB (premixed), 2 g, Intravenous, PRN, Yocham, Senait, FNP    calcium gluconate 1 g in NS IVPB (premixed), 3 g, Intravenous, PRN, Yocham, Senait, FNP    dexmedetomidine (PRECEDEX) 400mcg/100mL 0.9% NaCL infusion, 0-1.4 mcg/kg/hr, Intravenous, Continuous, Yocham, Senait, FNP, Stopped at 02/22/24 1310    dextrose 10% bolus 125 mL 125 mL, 12.5 g, Intravenous, PRN, Yocham, Senait, FNP    dextrose 10% bolus 250 mL 250 mL, 25 g, Intravenous, PRN, Yocham, Senait, FNP    dextrose 5 % and 0.45 % NaCl infusion, , Intravenous, Continuous, Yocham, Senait, FNP, Last Rate: 50 mL/hr at 02/23/24 1210, Rate Verify at 02/23/24 1210    docusate sodium capsule 100 mg, 100 mg, Oral, BID, Yocham, Senait, JUSTO, 100 mg at 02/23/24 0805    enoxaparin injection 40 mg, 40 mg, Subcutaneous, Daily, Senait Pardo FNP    EPINEPHrine (ADRENALIN) 5 mg in dextrose 5 % (D5W) 250 mL infusion, 0-2 mcg/kg/min, Intravenous, Continuous,  Yocham, Senait, FNP, Last Rate: 3.6 mL/hr at 02/23/24 1210, 0.01 mcg/kg/min at 02/23/24 1210    famotidine (PF) injection 20 mg, 20 mg, Intravenous, Daily, Yocham, Senait, FNP, 20 mg at 02/23/24 0805    folic acid tablet 1 mg, 1 mg, Oral, Daily, Yocham, Senait, FNP, 1 mg at 02/23/24 0805    insulin regular in 0.9 % NaCl 100 unit/100 mL (1 unit/mL) infusion, 0-52 Units/hr, Intravenous, Continuous, Yocham, Senait, FNP, Stopped at 02/23/24 1207    lactulose 10 gram/15 ml solution 20 g, 20 g, Oral, Q6H PRN, Yocham, Senait, FNP    loperamide capsule 2 mg, 2 mg, Oral, Continuous PRN, Yocham, Senait, FNP    magnesium sulfate 2g in water 50mL IVPB (premix), 2 g, Intravenous, PRN, Yocham, Senait, FNP    magnesium sulfate 2g in water 50mL IVPB (premix), 4 g, Intravenous, PRN, Yocham, Senait, FNP    metoclopramide injection 5 mg, 5 mg, Intravenous, Q6H PRN, Yocham, Senait, FNP    metoprolol tartrate (LOPRESSOR) split tablet 12.5 mg, 12.5 mg, Oral, BID, Yocham, Senait, FNP    mupirocin 2 % ointment, , Nasal, BID, Yocham, Senait, FNP, Given at 02/23/24 0823    ondansetron injection 4 mg, 4 mg, Intravenous, Q4H PRN, Yocham, Senait, FNP, 4 mg at 02/23/24 1205    potassium chloride 20 mEq in 100 mL IVPB (FOR CENTRAL LINE ADMINISTRATION ONLY), 20 mEq, Intravenous, PRN, Yocham, Senait, FNP, Stopped at 02/23/24 0820    potassium chloride 20 mEq in 100 mL IVPB (FOR CENTRAL LINE ADMINISTRATION ONLY), 40 mEq, Intravenous, PRN, Yocham, Senait, FNP, Stopped at 02/22/24 1502    sodium phosphate 15 mmol in dextrose 5 % (D5W) 250 mL IVPB, 15 mmol, Intravenous, PRN, Yocham, Senait, FNP    sodium phosphate 20.01 mmol in dextrose 5 % (D5W) 250 mL IVPB, 20.01 mmol, Intravenous, PRN, Yocham, Senait, FNP    sodium phosphate 30 mmol in dextrose 5 % (D5W) 250 mL IVPB, 30 mmol, Intravenous, PRN, Yocham, Senait, FNP    sucralfate tablet 1 g, 1 g, Oral, QID (AC & HS), Yocham, Senait, FNP, 1 g at 02/23/24 1009    Facility-Administered Medications Ordered in Other  "Encounters:     aspirin tablet 325 mg, 325 mg, Oral, On Call Procedure, Order, Paper, 325 mg at 01/04/24 0949    diazePAM tablet 10 mg, 10 mg, Oral, On Call Procedure, Order, Paper, 10 mg at 01/04/24 0949     Scheduled Meds:   acetaminophen  1,000 mg Intravenous Q6H    aspirin  81 mg Oral Daily    docusate sodium  100 mg Oral BID    enoxparin  40 mg Subcutaneous Daily    famotidine (PF)  20 mg Intravenous Daily    folic acid  1 mg Oral Daily    metoprolol tartrate  12.5 mg Oral BID    mupirocin   Nasal BID    sucralfate  1 g Oral QID (AC & HS)     Continuous Infusions:   dexmedeTOMIDine (Precedex) infusion (titrating) Stopped (02/22/24 1310)    dextrose 5 % and 0.45 % NaCl 50 mL/hr at 02/23/24 1210    EPINEPHrine 0.01 mcg/kg/min (02/23/24 1210)    insulin regular 1 units/mL infusion orderable (CTS POST-OP) Stopped (02/23/24 1207)    loperamide           Objective:  BP (!) 115/55   Pulse 85   Temp 98.4 °F (36.9 °C) (Oral)   Resp 19   Ht 6' 3" (1.905 m)   Wt 131.4 kg (289 lb 11 oz)   SpO2 100%   BMI 36.21 kg/m²  Body mass index is 36.21 kg/m².    I/O last 3 completed shifts:  In: 6605.1 [I.V.:4477.9; Blood:400; IV Piggyback:1727.2]  Out: 1965 [Urine:1230; Emesis/NG output:125; Chest Tube:610]  I/O this shift:  In: 626.6 [I.V.:510.7; IV Piggyback:115.9]  Out: 470 [Urine:390; Chest Tube:80]        Physical Exam:  General: no acute distress, awake, alert  Eyes: PERRLA, EOMI, conjunctiva clear,  HENT: atraumatic, oropharynx and nasal mucosa patent, no difficulty hearing  Neck: full ROM, no JVD, no thyromegaly or lymphadenopathy  Respiratory: equal, unlabored, rhonchi,decreased BS L  Cardiovascular: RRR without  rub; BL radial and pedal pulses felt, chest tube L, ++mediastinal tube  Edema:tr  Gastrointestinal: soft, non-tender, non-distended; positive bowel sounds; no masses to palpation  Musculoskeletal: without major limitation or discomfort  Integumentary: warm, dry; dressing on chest wall wound  Neurological:  " no acute motor or sensory deficits      Labs:  Chemistries:   Recent Labs   Lab 02/19/24  1147 02/22/24  0638 02/22/24  1507 02/22/24  2025 02/23/24  0144    142  --   --  140   K 4.1 3.4* 3.8 4.0 3.7   CO2 26 24  --   --  21*   BUN 18.6 19.4  --   --  19.3   CREATININE 3.04* 2.69*  --   --  2.75*   CALCIUM 8.9 9.7  --   --  8.3*   BILITOT 0.3  --   --   --  0.3   ALKPHOS 103  --   --   --  60   ALT 12  --   --   --  9   AST 13  --   --   --  34   GLUCOSE 343* 172*  --   --  154*   MG  --  3.00*  --   --  2.50   PHOS  --  2.7  --   --  2.2*        CBC/Anemia Labs: Coags:    Recent Labs   Lab 02/19/24  1147 02/22/24  0638 02/22/24  0715 02/22/24  1018 02/22/24  1507 02/23/24  0010   WBC 5.76 10.73  --   --   --  11.37   HGB 11.2* 9.4*  --   --  9.5* 8.8*   HCT 37.6* 30.5*   < > 29* 30.9* 28.8*    125*  --   --   --  150   MCV 94.0 93.3  --   --   --  92.9   RDW 14.5 14.2  --   --   --  14.6    < > = values in this interval not displayed.    Recent Labs   Lab 02/22/24  0638   INR 1.5*          Impression:    CKD4 FU by Dr Olson  SP CABG  Renal function stable        Plan:  Will follow       Leesa Lira      Thank you for this consult.

## 2024-02-23 NOTE — NURSING
Nurses Note -- 4 Eyes      2/22/2024   6:05 PM      Skin assessed during: Admit      [x] No Altered Skin Integrity Present    [x]Prevention Measures Documented      [] Yes- Altered Skin Integrity Present or Discovered   [] LDA Added if Not in Epic (Describe Wound)   [] New Altered Skin Integrity was Present on Admit and Documented in LDA   [] Wound Image Taken    Wound Care Consulted? No    Attending Nurse:  Tabatha HALL RN    Second RN/Staff Member:   Charo HOGAN RN

## 2024-02-23 NOTE — PLAN OF CARE
Problem: Adult Inpatient Plan of Care  Goal: Plan of Care Review  Outcome: Ongoing, Progressing  Goal: Patient-Specific Goal (Individualized)  Outcome: Ongoing, Progressing  Goal: Absence of Hospital-Acquired Illness or Injury  Outcome: Ongoing, Progressing  Goal: Optimal Comfort and Wellbeing  Outcome: Ongoing, Progressing  Goal: Readiness for Transition of Care  Outcome: Ongoing, Progressing     Problem: Infection  Goal: Absence of Infection Signs and Symptoms  Outcome: Ongoing, Progressing     Problem: Skin Injury Risk Increased  Goal: Skin Health and Integrity  Outcome: Ongoing, Progressing     Problem: Fall Injury Risk  Goal: Absence of Fall and Fall-Related Injury  Outcome: Ongoing, Progressing     Problem: Activity Intolerance (Cardiovascular Surgery)  Goal: Improved Activity Tolerance  Outcome: Ongoing, Progressing     Problem: Adjustment to Surgery (Cardiovascular Surgery)  Goal: Optimal Coping with Heart Surgery  Outcome: Ongoing, Progressing     Problem: Bleeding (Cardiovascular Surgery)  Goal: Bleeding (Cardiovascular Surgery)  Outcome: Ongoing, Progressing     Problem: Bowel Motility Impaired (Cardiovascular Surgery)  Goal: Effective Bowel Elimination (Cardiovascular Surgery)  Outcome: Ongoing, Progressing     Problem: Cardiac Function Impaired (Cardiovascular Surgery)  Goal: Effective Cardiac Function  Outcome: Ongoing, Progressing     Problem: Cerebral Tissue Perfusion (Cardiovascular Surgery)  Goal: Optimal Cerebral Tissue Perfusion (Cardiovascular Surgery)  Outcome: Ongoing, Progressing     Problem: Fluid and Electrolyte Imbalance (Cardiovascular Surgery)  Goal: Fluid and Electrolyte Balance (Cardiovascular Surgery)  Outcome: Ongoing, Progressing     Problem: Glycemic Control Impaired (Cardiovascular Surgery)  Goal: Blood Glucose Level Within Targeted Range (Cardiovascular Surgery)  Outcome: Ongoing, Progressing     Problem: Infection (Cardiovascular Surgery)  Goal: Absence of Infection Signs and  Symptoms  Outcome: Ongoing, Progressing     Problem: Ongoing Anesthesia Effects (Cardiovascular Surgery)  Goal: Anesthesia/Sedation Recovery  Outcome: Ongoing, Progressing     Problem: Pain (Cardiovascular Surgery)  Goal: Acceptable Pain Control  Outcome: Ongoing, Progressing     Problem: Postoperative Nausea and Vomiting (Cardiovascular Surgery)  Goal: Nausea and Vomiting Relief (Cardiovascular Surgery)  Outcome: Ongoing, Progressing     Problem: Postoperative Urinary Retention (Cardiovascular Surgery)  Goal: Effective Urinary Elimination (Cardiovascular Surgery)  Outcome: Ongoing, Progressing     Problem: Respiratory Compromise (Cardiovascular Surgery)  Goal: Effective Oxygenation and Ventilation (Cardiovascular Surgery)  Outcome: Ongoing, Progressing

## 2024-02-24 LAB
ALBUMIN SERPL-MCNC: 2.8 G/DL (ref 3.4–4.8)
ALBUMIN/GLOB SERPL: 1.1 RATIO (ref 1.1–2)
ALP SERPL-CCNC: 70 UNIT/L (ref 40–150)
ALT SERPL-CCNC: 8 UNIT/L (ref 0–55)
AST SERPL-CCNC: 34 UNIT/L (ref 5–34)
BASOPHILS # BLD AUTO: 0.02 X10(3)/MCL
BASOPHILS NFR BLD AUTO: 0.2 %
BILIRUB SERPL-MCNC: 0.5 MG/DL
BUN SERPL-MCNC: 21 MG/DL (ref 8.4–25.7)
CALCIUM SERPL-MCNC: 8.1 MG/DL (ref 8.8–10)
CHLORIDE SERPL-SCNC: 113 MMOL/L (ref 98–107)
CO2 SERPL-SCNC: 21 MMOL/L (ref 23–31)
CREAT SERPL-MCNC: 2.82 MG/DL (ref 0.73–1.18)
EOSINOPHIL # BLD AUTO: 0.02 X10(3)/MCL (ref 0–0.9)
EOSINOPHIL NFR BLD AUTO: 0.2 %
ERYTHROCYTE [DISTWIDTH] IN BLOOD BY AUTOMATED COUNT: 15 % (ref 11.5–17)
GFR SERPLBLD CREATININE-BSD FMLA CKD-EPI: 24 MLS/MIN/1.73/M2
GLOBULIN SER-MCNC: 2.5 GM/DL (ref 2.4–3.5)
GLUCOSE SERPL-MCNC: 187 MG/DL (ref 82–115)
HCT VFR BLD AUTO: 27.4 % (ref 42–52)
HGB BLD-MCNC: 8.7 G/DL (ref 14–18)
IMM GRANULOCYTES # BLD AUTO: 0.06 X10(3)/MCL (ref 0–0.04)
IMM GRANULOCYTES NFR BLD AUTO: 0.6 %
LYMPHOCYTES # BLD AUTO: 0.85 X10(3)/MCL (ref 0.6–4.6)
LYMPHOCYTES NFR BLD AUTO: 7.8 %
MCH RBC QN AUTO: 28.5 PG (ref 27–31)
MCHC RBC AUTO-ENTMCNC: 31.8 G/DL (ref 33–36)
MCV RBC AUTO: 89.8 FL (ref 80–94)
MONOCYTES # BLD AUTO: 0.85 X10(3)/MCL (ref 0.1–1.3)
MONOCYTES NFR BLD AUTO: 7.8 %
NEUTROPHILS # BLD AUTO: 9.09 X10(3)/MCL (ref 2.1–9.2)
NEUTROPHILS NFR BLD AUTO: 83.4 %
NRBC BLD AUTO-RTO: 0 %
PHOSPHATE SERPL-MCNC: 2.9 MG/DL (ref 2.3–4.7)
PLATELET # BLD AUTO: 118 X10(3)/MCL (ref 130–400)
PMV BLD AUTO: 10.6 FL (ref 7.4–10.4)
POCT GLUCOSE: 132 MG/DL (ref 70–110)
POCT GLUCOSE: 163 MG/DL (ref 70–110)
POCT GLUCOSE: 198 MG/DL (ref 70–110)
POCT GLUCOSE: 201 MG/DL (ref 70–110)
POCT GLUCOSE: 228 MG/DL (ref 70–110)
POTASSIUM SERPL-SCNC: 4.4 MMOL/L (ref 3.5–5.1)
PROT SERPL-MCNC: 5.3 GM/DL (ref 5.8–7.6)
PTH-INTACT SERPL-MCNC: 226.4 PG/ML (ref 8.7–77)
RBC # BLD AUTO: 3.05 X10(6)/MCL (ref 4.7–6.1)
SODIUM SERPL-SCNC: 138 MMOL/L (ref 136–145)
WBC # SPEC AUTO: 10.89 X10(3)/MCL (ref 4.5–11.5)

## 2024-02-24 PROCEDURE — 25000003 PHARM REV CODE 250: Performed by: PHYSICIAN ASSISTANT

## 2024-02-24 PROCEDURE — 85025 COMPLETE CBC W/AUTO DIFF WBC: CPT | Performed by: INTERNAL MEDICINE

## 2024-02-24 PROCEDURE — 25000003 PHARM REV CODE 250

## 2024-02-24 PROCEDURE — 21400001 HC TELEMETRY ROOM

## 2024-02-24 PROCEDURE — 99232 SBSQ HOSP IP/OBS MODERATE 35: CPT | Mod: ,,, | Performed by: INTERNAL MEDICINE

## 2024-02-24 PROCEDURE — 63600175 PHARM REV CODE 636 W HCPCS

## 2024-02-24 PROCEDURE — 99024 POSTOP FOLLOW-UP VISIT: CPT | Mod: ,,, | Performed by: PHYSICIAN ASSISTANT

## 2024-02-24 PROCEDURE — 83970 ASSAY OF PARATHORMONE: CPT | Performed by: INTERNAL MEDICINE

## 2024-02-24 PROCEDURE — 84100 ASSAY OF PHOSPHORUS: CPT | Performed by: INTERNAL MEDICINE

## 2024-02-24 PROCEDURE — 80053 COMPREHEN METABOLIC PANEL: CPT | Performed by: INTERNAL MEDICINE

## 2024-02-24 RX ORDER — POLYETHYLENE GLYCOL 3350 17 G/17G
17 POWDER, FOR SOLUTION ORAL DAILY PRN
Status: DISCONTINUED | OUTPATIENT
Start: 2024-02-24 | End: 2024-03-01 | Stop reason: HOSPADM

## 2024-02-24 RX ORDER — BISACODYL 10 MG/1
10 SUPPOSITORY RECTAL DAILY PRN
Status: DISCONTINUED | OUTPATIENT
Start: 2024-02-24 | End: 2024-03-01 | Stop reason: HOSPADM

## 2024-02-24 RX ORDER — MUPIROCIN 20 MG/G
OINTMENT TOPICAL 2 TIMES DAILY
Status: DISPENSED | OUTPATIENT
Start: 2024-02-24 | End: 2024-02-26

## 2024-02-24 RX ORDER — ASPIRIN 81 MG/1
81 TABLET ORAL DAILY
Status: DISCONTINUED | OUTPATIENT
Start: 2024-02-24 | End: 2024-03-01 | Stop reason: HOSPADM

## 2024-02-24 RX ORDER — DOCUSATE SODIUM 100 MG/1
200 CAPSULE, LIQUID FILLED ORAL 2 TIMES DAILY
Status: DISCONTINUED | OUTPATIENT
Start: 2024-02-24 | End: 2024-03-01 | Stop reason: HOSPADM

## 2024-02-24 RX ADMIN — METOPROLOL TARTRATE 12.5 MG: 25 TABLET, FILM COATED ORAL at 08:02

## 2024-02-24 RX ADMIN — FOLIC ACID 1 MG: 1 TABLET ORAL at 08:02

## 2024-02-24 RX ADMIN — ENOXAPARIN SODIUM 40 MG: 40 INJECTION SUBCUTANEOUS at 04:02

## 2024-02-24 RX ADMIN — INSULIN ASPART 4 UNITS: 100 INJECTION, SOLUTION INTRAVENOUS; SUBCUTANEOUS at 08:02

## 2024-02-24 RX ADMIN — ASPIRIN 81 MG: 81 TABLET, COATED ORAL at 08:02

## 2024-02-24 RX ADMIN — MUPIROCIN: 20 OINTMENT TOPICAL at 08:02

## 2024-02-24 RX ADMIN — ACETAMINOPHEN 650 MG: 650 SOLUTION ORAL at 02:02

## 2024-02-24 RX ADMIN — FAMOTIDINE 20 MG: 10 INJECTION, SOLUTION INTRAVENOUS at 08:02

## 2024-02-24 RX ADMIN — SUCRALFATE 1 G: 1 TABLET ORAL at 08:02

## 2024-02-24 RX ADMIN — ACETAMINOPHEN AND CODEINE PHOSPHATE 1 TABLET: 300; 30 TABLET ORAL at 05:02

## 2024-02-24 RX ADMIN — DOCUSATE SODIUM 200 MG: 100 CAPSULE, LIQUID FILLED ORAL at 08:02

## 2024-02-24 RX ADMIN — SUCRALFATE 1 G: 1 TABLET ORAL at 12:02

## 2024-02-24 RX ADMIN — ACETAMINOPHEN AND CODEINE PHOSPHATE 1 TABLET: 300; 30 TABLET ORAL at 08:02

## 2024-02-24 RX ADMIN — ACETAMINOPHEN AND CODEINE PHOSPHATE 1 TABLET: 300; 30 TABLET ORAL at 12:02

## 2024-02-24 RX ADMIN — DOCUSATE SODIUM 100 MG: 100 CAPSULE, LIQUID FILLED ORAL at 08:02

## 2024-02-24 RX ADMIN — INSULIN ASPART 2 UNITS: 100 INJECTION, SOLUTION INTRAVENOUS; SUBCUTANEOUS at 05:02

## 2024-02-24 RX ADMIN — SUCRALFATE 1 G: 1 TABLET ORAL at 04:02

## 2024-02-24 RX ADMIN — INSULIN ASPART 4 UNITS: 100 INJECTION, SOLUTION INTRAVENOUS; SUBCUTANEOUS at 12:02

## 2024-02-24 NOTE — NURSING
Nurses Note -- 4 Eyes      2/24/2024   9:50 AM      Skin assessed during: Daily Assessment      [x] No Altered Skin Integrity Present    [x]Prevention Measures Documented      [] Yes- Altered Skin Integrity Present or Discovered   [] LDA Added if Not in Epic (Describe Wound)   [] New Altered Skin Integrity was Present on Admit and Documented in LDA   [] Wound Image Taken    Wound Care Consulted? No    Attending Nurse:  Senait Badillo RN/Staff Member:   VANESSA Plasencia

## 2024-02-24 NOTE — PROGRESS NOTES
[facility] - [facility] ICU  Pulmonary Critical Care Note    Patient Name: Rustam Matthews  MRN: [id number]  Admission Date: 2/22/2024  Hospital Length of Stay: 2 days  Code Status: Full Code  Attending Provider: Silvana Bauman MD  Primary Care Provider: Vasquez Rodriguez MD     Subjective:     HPI:   Rustam Matthews is a 63 year old male with PMH of  PAD, HTN, DM, CKD 3, chronic ENOCH, recurrent GI bleed (followed by GI and hematology). He underwent LHC with CIS 12/13/23 and was found to have severe multivessel CAD (prox LAD, mid to distal left circumflex, mid RCA) and was thus referred to CT surgery for CABG. He was admitted to [facility] 2/22/24 and underwent CABG x3 left internal mammary artery to LAD, saphenous venous graft to OM1, Om1 endarterectomy, saphenous venous graft to PDA, ligation of left atrial appendage. with CV surgery. He was admitted to ICU postoperatively    Hospital Course/Significant events:  2/22/24: admit to ICU s/p CABG    24 Hour Interval History:  NAEON. No longer requiring epi. VSS. Satting 95%+ on 5L oxymask. Sitting in chair, reports post op pain. I/o +1075/-1685, 240cc of which was from chest tube output. Creatinine remains elevated 2.82.     Past Medical History:   Diagnosis Date    Abnormal cardiovascular stress test     Acute anemia     Angiectasia     Angina pectoris     Arthritis     Atherosclerosis of native arteries of extremities with intermittent claudication, unspecified extremity     Back pain     Coronary artery disease     Coronary artery disease, unspecified vessel or lesion type, unspecified whether angina present, unspecified whether native or transplanted heart     Diabetes mellitus     Heart disease     Hypertension     Obesity     Peripheral artery disease     Renal disorder     kidney disease Stage IV    SOB (shortness of breath)     Weakness of both legs        Past Surgical History:   Procedure Laterality Date    COLONOSCOPY      CORONARY ANGIOGRAPHY Right  "01/04/2024    Procedure: ANGIOGRAM, CORONARY ARTERY;  Surgeon: Ruben Bee MD;  Location: [facility] CATH LAB;  Service: Cardiology;  Laterality: Right;    CORONARY ARTERY BYPASS GRAFT (CABG) N/A 2/22/2024    Procedure: CORONARY ARTERY BYPASS GRAFT (CABG);  Surgeon: Silvana aBuman MD;  Location: [facility] OR;  Service: Cardiovascular;  Laterality: N/A;    ENDARTERECTOMY N/A 2/22/2024    Procedure: ENDARTERECTOMY;  Surgeon: Silvana Bauman MD;  Location: [facility] OR;  Service: Cardiovascular;  Laterality: N/A;  CORONARY ENDARTERECTOMY    ENDOSCOPIC HARVEST OF VEIN N/A 2/22/2024    Procedure: SURGICAL PROCUREMENT, VEIN, ENDOSCOPIC;  Surgeon: Silvana Bauman MD;  Location: [facility] OR;  Service: Cardiovascular;  Laterality: N/A;    EXCLUSION OF LEFT ATRIAL APPENDAGE N/A 2/22/2024    Procedure: EXCLUSION, LEFT ATRIAL APPENDAGE;  Surgeon: Silvana Bauman MD;  Location: [facility] OR;  Service: Cardiovascular;  Laterality: N/A;    LEFT HEART CATHETERIZATION Right 01/04/2024    Procedure: Left heart cath;  Surgeon: Ruben Bee MD;  Location: [facility] CATH LAB;  Service: Cardiology;  Laterality: Right;  DIAGNOSTIC PERIPHERAL    PERIPHERAL ANGIOGRAPHY      PLACEMENT-STENT      pt states "in one of my legs"    UPPER GASTROINTESTINAL ENDOSCOPY         Social History     Socioeconomic History    Marital status: Single   Tobacco Use    Smoking status: Some Days     Current packs/day: 0.00     Types: Cigarettes, Vaping with nicotine     Last attempt to quit: 2/1/2021     Years since quitting: 3.0    Smokeless tobacco: Never    Tobacco comments:     Quit smoking 2 years ago-only vapes some days    Substance and Sexual Activity    Alcohol use: Never    Drug use: Never    Sexual activity: Not Currently     Partners: Female     Birth control/protection: None     Social Determinants of Health     Financial Resource Strain: Low Risk  (6/1/2023)    Overall Financial Resource Strain (CARDIA)     Difficulty of Paying Living Expenses: Not hard at all "   Food Insecurity: No Food Insecurity (2/23/2024)    Hunger Vital Sign     Worried About Running Out of Food in the Last Year: Never true     Ran Out of Food in the Last Year: Never true   Transportation Needs: No Transportation Needs (2/23/2024)    PRAPARE - Transportation     Lack of Transportation (Medical): No     Lack of Transportation (Non-Medical): No   Physical Activity: Inactive (6/1/2023)    Exercise Vital Sign     Days of Exercise per Week: 0 days     Minutes of Exercise per Session: 0 min   Stress: No Stress Concern Present (6/1/2023)    [employer] [place] [employer] - Occupational Stress Questionnaire     Feeling of Stress : Not at all   Social Connections: Socially Isolated (6/1/2023)    Social Connection and Isolation Panel [NHANES]     Frequency of Communication with Friends and Family: Twice a week     Frequency of Social Gatherings with Friends and Family: Never     Attends Protestant Services: 1 to 4 times per year     Active Member of Clubs or Organizations: No     Attends Club or Organization Meetings: Never     Marital Status:    Housing Stability: Low Risk  (2/23/2024)    Housing Stability Vital Sign     Unable to Pay for Housing in the Last Year: No     Number of Places Lived in the Last Year: 1     Unstable Housing in the Last Year: No           Current Outpatient Medications   Medication Instructions    acyclovir (ZOVIRAX) 400 mg, Oral, As needed (PRN)    ALPRAZolam (XANAX) 1 mg, Oral, 2 times daily    amitriptyline (ELAVIL) 50 mg, Oral, Nightly    amLODIPine (NORVASC) 10 mg, Oral    aspirin (ECOTRIN) 81 mg, Oral, Daily    atenoloL (TENORMIN) 50 mg, Oral, Daily    calcitRIOL (ROCALTROL) 0.5 mcg, Oral, Four times weekly    cilostazoL (PLETAL) 50 mg, Oral, 2 times daily    clopidogreL (PLAVIX) 75 mg, Oral, Daily    furosemide (LASIX) 20 mg, Oral, Daily, Take 2 tablets by mouth once a day as needed for swelling    glimepiride (AMARYL) 4 mg, Oral, Daily    KLOR-CON 10 10 mEq  TbSR 10 mEq, Oral, Daily    metoprolol succinate (TOPROL-XL) 100 mg, Oral, Daily    nitroGLYCERIN (NITROSTAT) 0.4 MG SL tablet Sublingual    olmesartan-hydrochlorothiazide (BENICAR HCT) 40-25 mg per tablet 1 tablet, Oral, Daily    omeprazole (PRILOSEC) 40 mg, Oral, Daily    ranolazine (RANEXA) 500 mg, Oral, 2 times daily    rosuvastatin (CRESTOR) 40 mg, Oral, Nightly    sildenafiL (VIAGRA) 100 mg, Oral, Daily PRN    sodium bicarbonate 650 mg, Oral, 2 times daily       Current Inpatient Medications   aspirin  81 mg Oral Daily    docusate sodium  100 mg Oral BID    enoxparin  40 mg Subcutaneous Daily    famotidine (PF)  20 mg Intravenous Daily    folic acid  1 mg Oral Daily    metoprolol tartrate  12.5 mg Oral BID    mupirocin   Nasal BID    sucralfate  1 g Oral QID (AC & HS)       Current Intravenous Infusions   dexmedeTOMIDine (Precedex) infusion (titrating) Stopped (02/22/24 1310)    dextrose 5 % and 0.45 % NaCl 50 mL/hr at 02/23/24 1846    EPINEPHrine Stopped (02/23/24 1740)    insulin regular 1 units/mL infusion orderable (CTS POST-OP) Stopped (02/23/24 1207)    loperamide           Review of Systems   Constitutional:  Negative for chills and fever.   Respiratory:  Negative for cough.    Cardiovascular:  Negative for palpitations.        Post op discomfort   Gastrointestinal:  Negative for abdominal pain, nausea and vomiting.   Genitourinary:  Negative for flank pain and hematuria.   Skin:  Negative for itching and rash.   Neurological:  Negative for weakness and headaches.   Endo/Heme/Allergies:  Does not bruise/bleed easily.          Objective:       Intake/Output Summary (Last 24 hours) at 2/24/2024 0723  Last data filed at 2/24/2024 0600  Gross per 24 hour   Intake 1075.41 ml   Output 1685 ml   Net -609.59 ml           Vital Signs (Most Recent):  Temp: 98.2 °F (36.8 °C) (02/24/24 0400)  Pulse: 85 (02/24/24 0700)  Resp: 17 (02/24/24 0700)  BP: 92/65 (02/24/24 0700)  SpO2: 98 % (02/24/24 0700)  Body mass index  is 36.18 kg/m².  Weight: 131.3 kg (289 lb 7.4 oz) Vital Signs (24h Range):  Temp:  [98.2 °F (36.8 °C)-99 °F (37.2 °C)] 98.2 °F (36.8 °C)  Pulse:  [77-99] 85  Resp:  [16-27] 17  SpO2:  [59 %-100 %] 98 %  BP: ()/() 92/65     Physical Exam  Vitals and nursing note reviewed.   Constitutional:       General: He is not in acute distress.  HENT:      Head: Normocephalic.      Right Ear: External ear normal.      Left Ear: External ear normal.      Nose: Nose normal.      Mouth/Throat:      Mouth: Mucous membranes are moist.   Eyes:      Conjunctiva/sclera: Conjunctivae normal.      Pupils: Pupils are equal, round, and reactive to light.   Cardiovascular:      Rate and Rhythm: Normal rate and regular rhythm.      Heart sounds: No murmur heard.     Comments: Chest tubes in place  Pulmonary:      Effort: No respiratory distress.      Breath sounds: No wheezing or rhonchi.   Abdominal:      General: There is no distension.      Palpations: Abdomen is soft.      Tenderness: There is no abdominal tenderness. There is no guarding.   Musculoskeletal:         General: No swelling or deformity.   Skin:     General: Skin is warm and dry.      Capillary Refill: Capillary refill takes less than 2 seconds.   Neurological:      General: No focal deficit present.      Mental Status: He is oriented to person, place, and time. Mental status is at baseline.      Cranial Nerves: No cranial nerve deficit.         Lines/Drains/Airways       Drain  Duration                  Chest Tube 02/22/24 Tube - 1 Anterior Mediastinal 28 Fr. 2 days         Chest Tube 02/22/24 Tube - 2 Left Pleural 28 Fr. 2 days         Urethral Catheter 02/22/24 0655 Temperature probe 16 Fr. 2 days              Peripheral Intravenous Line  Duration                  Peripheral IV - Single Lumen 02/24/24 0625 20 G Anterior;Left Forearm <1 day                    Significant Labs:    Lab Results   Component Value Date    WBC 10.89 02/24/2024    HGB 8.7 (L)  02/24/2024    HCT 27.4 (L) 02/24/2024    MCV 89.8 02/24/2024     (L) 02/24/2024         BMP  Lab Results   Component Value Date     02/24/2024    K 4.4 02/24/2024    CO2 21 (L) 02/24/2024    BUN 21.0 02/24/2024    CREATININE 2.82 (H) 02/24/2024    CALCIUM 8.1 (L) 02/24/2024       ABG  Recent Labs   Lab 02/22/24  1018 02/22/24  1150 02/23/24  0207   PH 7.255*   < > 7.350   PO2 233*   < > 70.0*   PCO2 55.0*   < > 40.0   HCO3 24.4   < > 22.1   BE -3*  --   --     < > = values in this interval not displayed.         Mechanical Ventilation Support:  Vent Mode: CPAP PSV (02/22/24 1438)  Set Rate: 10 BPM (02/22/24 1327)  Vt Set: 500 mL (02/22/24 1327)  Pressure Support: 10 cmH20 (02/22/24 1438)  PEEP/CPAP: 5 cmH20 (02/22/24 1438)  Oxygen Concentration (%): 5 (02/24/24 0600)  Peak Airway Pressure: 15 cmH20 (02/22/24 1438)  Total Ve: 13.8 L/m (02/22/24 1438)  F/VT Ratio<105 (RSBI): (!) 23.51 (02/22/24 1438)    Significant Imaging:  I have reviewed the pertinent imaging within the past 24 hours.        Assessment/Plan:     Assessment  Severe multivessel CAD s/p CABG x3 2/22/24  PAD  HTN  DM  History of chronic ENOCH and recurrent GI bleed (followed by GI/ Hematology)      Plan  Continue with post op CABG protocol  Extubated without issue, wean NC as tolerated  Weaned from epi.   Chest tube management per CV surgery, monitor for increased output    DVT Prophylaxis: lovenox  GI Prophylaxis: famotidine     Greater than 30 minutes of critical care was time spent personally by me on the following activities: development of treatment plan with patient or surrogate and bedside caregivers, discussions with consultants, evaluation of patient's response to treatment, examination of patient, ordering and performing treatments and interventions, ordering and review of laboratory studies, ordering and review of radiographic studies, pulse oximetry, re-evaluation of patient's condition.  This critical care time did not overlap  with that of any other provider or involve time for any procedures.     Yassine Haas, DO  Pulmonary Critical Care Medicine  [facility] - [facility] ICU

## 2024-02-24 NOTE — PROGRESS NOTES
[facility]      HPI:     Patient Name: Rustam Matthews  Admission Date: 2/22/2024  Hospital Length of Stay: 2 days  Code Status: Full Code   Attending Physician: Silvana Bauman MD   Primary Care Physician: Vasquez Rodriguez MD  Principal Problem:<principal problem not specified>      Today patient seen and examined  Labs, recent events, imaging and medications reviewed for this hospital stay  More alert and responsive  Feels ok   No dyspnea    Review of Systems:   Constitutional: Denies fever, fatigue, generalized weakness, night sweats, or acute weight change  Skin:+chest wall wound  HEENT: Denies acute change in hearing or vision, tinnitus, or dysphagia  Respiratory:  Denies cough, shortness of breath, or wheezing  Cardiovascular: no palpitations, +chestwall wound  Gastrointestional: Denies abdominal pain, nausea, vomiting, diarrhea, or constipation  Genitourinary: good uo  Neurological: Denies headaches, seizures, dizziness, paresthesias or asterixis  Hematological: Denies unusual bruising or bleeding  Psychiatric: Denies hallucinations, depression, or confusion      Medications:   Scheduled Meds:   aspirin  81 mg Oral Daily    docusate sodium  100 mg Oral BID    enoxparin  40 mg Subcutaneous Daily    famotidine (PF)  20 mg Intravenous Daily    folic acid  1 mg Oral Daily    metoprolol tartrate  12.5 mg Oral BID    mupirocin   Nasal BID    sucralfate  1 g Oral QID (AC & HS)     Continuous Infusions:   dexmedeTOMIDine (Precedex) infusion (titrating) Stopped (02/22/24 1310)    dextrose 5 % and 0.45 % NaCl 50 mL/hr at 02/23/24 1846    EPINEPHrine Stopped (02/23/24 1740)    insulin regular 1 units/mL infusion orderable (CTS POST-OP) Stopped (02/23/24 1207)    loperamide           Vitals:     Vitals:    02/24/24 0700 02/24/24 0730 02/24/24 0800 02/24/24 0830   BP: 92/65 115/65 133/71 137/77   BP Location: Left arm  Left arm    Patient Position: Lying  Lying    Pulse: 85 86 79 95   Resp: 17  17 18 20   Temp:   98.3 °F (36.8 °C)    TempSrc:   Oral    SpO2: 98% 97% 97% 96%   Weight:       Height:             I/O last 3 completed shifts:  In: 3278.8 [I.V.:2807; IV Piggyback:471.8]  Out: 2745 [Urine:2080; Chest Tube:665]    Intake/Output Summary (Last 24 hours) at 2/24/2024 0900  Last data filed at 2/24/2024 0800  Gross per 24 hour   Intake 1107.02 ml   Output 1560 ml   Net -452.98 ml       Physical Exam:   General: no acute distress, awake, alert  Eyes: PERRLA, EOMI, conjunctiva clear, eyelids without swelling  HENT: atraumatic, oropharynx and nasal mucosa patent  Neck: full ROM, no JVD, no thyromegaly or lymphadenopathy  Respiratory: equal, unlabored, some dec BS L  Cardiovascular: RRR without rub; BL radial and pedal pulses felt  Edema: none  Gastrointestinal: soft, non-tender, non-distended; positive bowel sounds; no masses to palpation  Genitourinary:catheter  Musculoskeletal: ROM without limitation or discomfort  Integumentary: warm, dry; no rashes,   Neurological: oriented, appropriate, no acute deficits        Labs:     Chemistries:   Recent Labs   Lab 02/19/24  1147 02/22/24  0638 02/22/24  1507 02/22/24  2025 02/23/24  0144 02/24/24  0128    142  --   --  140 138   K 4.1 3.4*   < > 4.0 3.7 4.4   CO2 26 24  --   --  21* 21*   BUN 18.6 19.4  --   --  19.3 21.0   CREATININE 3.04* 2.69*  --   --  2.75* 2.82*   CALCIUM 8.9 9.7  --   --  8.3* 8.1*   BILITOT 0.3  --   --   --  0.3 0.5   ALKPHOS 103  --   --   --  60 70   ALT 12  --   --   --  9 8   AST 13  --   --   --  34 34   GLUCOSE 343* 172*  --   --  154* 187*   MG  --  3.00*  --   --  2.50  --    PHOS  --  2.7  --   --  2.2* 2.9    < > = values in this interval not displayed.        CBC/Anemia Labs: Coags:    Recent Labs   Lab 02/22/24  0638 02/22/24  0715 02/22/24  1507 02/23/24  0010 02/24/24  0128   WBC 10.73  --   --  11.37 10.89   HGB 9.4*  --  9.5* 8.8* 8.7*   HCT 30.5*   < > 30.9* 28.8* 27.4*   *  --   --  150 118*   MCV 93.3   --   --  92.9 89.8   RDW 14.2  --   --  14.6 15.0    < > = values in this interval not displayed.    Recent Labs   Lab 02/22/24  0638   INR 1.5*          Impression:     SP CABG  CKD 4 stable  Anemia from CKD and surgery    Plan:   Will continue to FU  Will consider retacrit if Hb drops further         Leesa Lira

## 2024-02-24 NOTE — NURSING
Nurses Note -- 4 Eyes      2/24/2024   4:09 AM      Skin assessed during: Daily Assessment      [x] No Altered Skin Integrity Present    [x]Prevention Measures Documented      [] Yes- Altered Skin Integrity Present or Discovered   [] LDA Added if Not in Epic (Describe Wound)   [] New Altered Skin Integrity was Present on Admit and Documented in LDA   [] Wound Image Taken    Wound Care Consulted? No    Attending Nurse:  VANESSA Cervantes     Second RN/Staff Member:  VANESSA Mcallister

## 2024-02-24 NOTE — PROGRESS NOTES
Pod 2  More awake/alert  Uop 1445cc  Vss  Heart sinus  Wounds c/d/I  Hct 27  Cr 2.82  Cts draining  Waterseal, PT

## 2024-02-25 LAB
ALBUMIN SERPL-MCNC: 2.8 G/DL (ref 3.4–4.8)
ALBUMIN/GLOB SERPL: 0.8 RATIO (ref 1.1–2)
ALP SERPL-CCNC: 87 UNIT/L (ref 40–150)
ALT SERPL-CCNC: 7 UNIT/L (ref 0–55)
AST SERPL-CCNC: 27 UNIT/L (ref 5–34)
BASOPHILS # BLD AUTO: 0.01 X10(3)/MCL
BASOPHILS NFR BLD AUTO: 0.1 %
BILIRUB SERPL-MCNC: 0.5 MG/DL
BUN SERPL-MCNC: 27.6 MG/DL (ref 8.4–25.7)
CALCIUM SERPL-MCNC: 8.7 MG/DL (ref 8.8–10)
CHLORIDE SERPL-SCNC: 113 MMOL/L (ref 98–107)
CO2 SERPL-SCNC: 19 MMOL/L (ref 23–31)
CREAT SERPL-MCNC: 3 MG/DL (ref 0.73–1.18)
EOSINOPHIL # BLD AUTO: 0.14 X10(3)/MCL (ref 0–0.9)
EOSINOPHIL NFR BLD AUTO: 1.5 %
ERYTHROCYTE [DISTWIDTH] IN BLOOD BY AUTOMATED COUNT: 15.2 % (ref 11.5–17)
ERYTHROCYTE [DISTWIDTH] IN BLOOD BY AUTOMATED COUNT: 15.2 % (ref 11.5–17)
GFR SERPLBLD CREATININE-BSD FMLA CKD-EPI: 22 MLS/MIN/1.73/M2
GLOBULIN SER-MCNC: 3.4 GM/DL (ref 2.4–3.5)
GLUCOSE SERPL-MCNC: 134 MG/DL (ref 82–115)
HCT VFR BLD AUTO: 31.2 % (ref 42–52)
HCT VFR BLD AUTO: 31.2 % (ref 42–52)
HGB BLD-MCNC: 10 G/DL (ref 14–18)
HGB BLD-MCNC: 10 G/DL (ref 14–18)
IMM GRANULOCYTES # BLD AUTO: 0.05 X10(3)/MCL (ref 0–0.04)
IMM GRANULOCYTES NFR BLD AUTO: 0.5 %
LYMPHOCYTES # BLD AUTO: 1.04 X10(3)/MCL (ref 0.6–4.6)
LYMPHOCYTES NFR BLD AUTO: 11.1 %
MCH RBC QN AUTO: 28.7 PG (ref 27–31)
MCH RBC QN AUTO: 28.7 PG (ref 27–31)
MCHC RBC AUTO-ENTMCNC: 32.1 G/DL (ref 33–36)
MCHC RBC AUTO-ENTMCNC: 32.1 G/DL (ref 33–36)
MCV RBC AUTO: 89.7 FL (ref 80–94)
MCV RBC AUTO: 89.7 FL (ref 80–94)
MONOCYTES # BLD AUTO: 0.7 X10(3)/MCL (ref 0.1–1.3)
MONOCYTES NFR BLD AUTO: 7.5 %
NEUTROPHILS # BLD AUTO: 7.43 X10(3)/MCL (ref 2.1–9.2)
NEUTROPHILS NFR BLD AUTO: 79.3 %
NRBC BLD AUTO-RTO: 0 %
NRBC BLD AUTO-RTO: 0 %
PLATELET # BLD AUTO: 154 X10(3)/MCL (ref 130–400)
PLATELET # BLD AUTO: 154 X10(3)/MCL (ref 130–400)
PMV BLD AUTO: 10.4 FL (ref 7.4–10.4)
PMV BLD AUTO: 10.4 FL (ref 7.4–10.4)
POCT GLUCOSE: 126 MG/DL (ref 70–110)
POCT GLUCOSE: 140 MG/DL (ref 70–110)
POCT GLUCOSE: 160 MG/DL (ref 70–110)
POTASSIUM SERPL-SCNC: 4.4 MMOL/L (ref 3.5–5.1)
PROT SERPL-MCNC: 6.2 GM/DL (ref 5.8–7.6)
RBC # BLD AUTO: 3.48 X10(6)/MCL (ref 4.7–6.1)
RBC # BLD AUTO: 3.48 X10(6)/MCL (ref 4.7–6.1)
SODIUM SERPL-SCNC: 140 MMOL/L (ref 136–145)
WBC # SPEC AUTO: 9.37 X10(3)/MCL (ref 4.5–11.5)
WBC # SPEC AUTO: 9.37 X10(3)/MCL (ref 4.5–11.5)

## 2024-02-25 PROCEDURE — 25000003 PHARM REV CODE 250: Performed by: INTERNAL MEDICINE

## 2024-02-25 PROCEDURE — 99232 SBSQ HOSP IP/OBS MODERATE 35: CPT | Mod: ,,, | Performed by: INTERNAL MEDICINE

## 2024-02-25 PROCEDURE — 80053 COMPREHEN METABOLIC PANEL: CPT

## 2024-02-25 PROCEDURE — 63600175 PHARM REV CODE 636 W HCPCS

## 2024-02-25 PROCEDURE — 25000003 PHARM REV CODE 250: Performed by: PHYSICIAN ASSISTANT

## 2024-02-25 PROCEDURE — 85025 COMPLETE CBC W/AUTO DIFF WBC: CPT | Performed by: INTERNAL MEDICINE

## 2024-02-25 PROCEDURE — 25000003 PHARM REV CODE 250

## 2024-02-25 PROCEDURE — 21400001 HC TELEMETRY ROOM

## 2024-02-25 PROCEDURE — 99024 POSTOP FOLLOW-UP VISIT: CPT | Mod: ,,, | Performed by: PHYSICIAN ASSISTANT

## 2024-02-25 PROCEDURE — 85027 COMPLETE CBC AUTOMATED: CPT

## 2024-02-25 RX ORDER — TORSEMIDE 20 MG/1
40 TABLET ORAL DAILY
Status: DISCONTINUED | OUTPATIENT
Start: 2024-02-25 | End: 2024-02-27

## 2024-02-25 RX ADMIN — TORSEMIDE 40 MG: 20 TABLET ORAL at 10:02

## 2024-02-25 RX ADMIN — INSULIN ASPART 2 UNITS: 100 INJECTION, SOLUTION INTRAVENOUS; SUBCUTANEOUS at 12:02

## 2024-02-25 RX ADMIN — ACETAMINOPHEN AND CODEINE PHOSPHATE 1 TABLET: 300; 30 TABLET ORAL at 08:02

## 2024-02-25 RX ADMIN — DOCUSATE SODIUM 200 MG: 100 CAPSULE, LIQUID FILLED ORAL at 08:02

## 2024-02-25 RX ADMIN — METOPROLOL TARTRATE 12.5 MG: 25 TABLET, FILM COATED ORAL at 08:02

## 2024-02-25 RX ADMIN — DOCUSATE SODIUM 200 MG: 100 CAPSULE, LIQUID FILLED ORAL at 09:02

## 2024-02-25 RX ADMIN — FAMOTIDINE 20 MG: 10 INJECTION, SOLUTION INTRAVENOUS at 09:02

## 2024-02-25 RX ADMIN — FOLIC ACID 1 MG: 1 TABLET ORAL at 09:02

## 2024-02-25 RX ADMIN — SUCRALFATE 1 G: 1 TABLET ORAL at 10:02

## 2024-02-25 RX ADMIN — METOPROLOL TARTRATE 12.5 MG: 25 TABLET, FILM COATED ORAL at 09:02

## 2024-02-25 RX ADMIN — SUCRALFATE 1 G: 1 TABLET ORAL at 06:02

## 2024-02-25 RX ADMIN — SUCRALFATE 1 G: 1 TABLET ORAL at 04:02

## 2024-02-25 RX ADMIN — ENOXAPARIN SODIUM 40 MG: 40 INJECTION SUBCUTANEOUS at 04:02

## 2024-02-25 RX ADMIN — ACETAMINOPHEN AND CODEINE PHOSPHATE 1 TABLET: 300; 30 TABLET ORAL at 03:02

## 2024-02-25 RX ADMIN — MUPIROCIN: 20 OINTMENT TOPICAL at 08:02

## 2024-02-25 RX ADMIN — SUCRALFATE 1 G: 1 TABLET ORAL at 08:02

## 2024-02-25 RX ADMIN — ASPIRIN 81 MG: 81 TABLET, COATED ORAL at 09:02

## 2024-02-25 NOTE — PROGRESS NOTES
[facility] Inpatient Progress Note      HPI:     Patient Name: Rustam Matthews  Admission Date: 2/22/2024  Hospital Length of Stay: 3 days  Code Status: Full Code   Attending Physician: Silvana Bauman MD   Primary Care Physician: Vasquez Rodriguez MD  Principal Problem:<principal problem not specified>      Today patient seen and examined  Labs, recent events, imaging and medications reviewed for this hospital stay  Feesl ok  Occ dyspnea  UO maintatined    Review of Systems:   Sitting in chair on oxygen no dyspnea no abdominal pain no nausea vomiting urine output maintained  Positive chest wall wound      Medications:   Scheduled Meds:   aspirin  81 mg Oral Daily    docusate sodium  200 mg Oral BID    enoxparin  40 mg Subcutaneous Daily    famotidine (PF)  20 mg Intravenous Daily    folic acid  1 mg Oral Daily    metoprolol tartrate  12.5 mg Oral BID    mupirocin   Nasal BID    sucralfate  1 g Oral QID (AC & HS)    torsemide  40 mg Oral Daily     Continuous Infusions:   insulin regular 1 units/mL infusion orderable (CTS POST-OP) Stopped (02/23/24 1207)    loperamide           Vitals:     Vitals:    02/25/24 0333 02/25/24 0336 02/25/24 0622 02/25/24 0747   BP:  (!) 158/82  (!) 106/58   BP Location:       Patient Position:       Pulse:  93  83   Resp: 18   18   Temp:  98.6 °F (37 °C)  98.3 °F (36.8 °C)   TempSrc:  Oral  Oral   SpO2:  98%     Weight:   130.1 kg (286 lb 13.1 oz)    Height:             I/O last 3 completed shifts:  In: 176.7 [I.V.:176.7]  Out: 1455 [Urine:1255; Chest Tube:200]    Intake/Output Summary (Last 24 hours) at 2/25/2024 0920  Last data filed at 2/25/2024 0852  Gross per 24 hour   Intake --   Output 610 ml   Net -610 ml       Physical Exam:   General: no acute distress, awake, alert  Eyes: PERRLA, EOMI, conjunctiva clear, eyelids without swelling  HENT: atraumatic, oropharynx and nasal mucosa patent  Neck: full ROM, no JVD, no thyromegaly or lymphadenopathy  Respiratory: equal, unlabored,  bill bilateral rhonchi at the bases few rales decreased breath sounds left base  Cardiovascular: RRR without rub; BL radial and pedal pulses felt  Edema:  Trace to +1 peripheral  Gastrointestinal: soft, non-tender, non-distended; positive bowel sounds; no masses to palpation  Musculoskeletal: without new limitation or discomfort  Integumentary: warm, dry; no rashes,   Neurological: oriented, appropriate, no acute deficits        Labs:     Chemistries:   Recent Labs   Lab 02/22/24  0638 02/22/24  1507 02/23/24  0144 02/24/24  0128 02/25/24  0653     --  140 138 140   K 3.4*   < > 3.7 4.4 4.4   CO2 24  --  21* 21* 19*   BUN 19.4  --  19.3 21.0 27.6*   CREATININE 2.69*  --  2.75* 2.82* 3.00*   CALCIUM 9.7  --  8.3* 8.1* 8.7*   BILITOT  --   --  0.3 0.5 0.5   ALKPHOS  --   --  60 70 87   ALT  --   --  9 8 7   AST  --   --  34 34 27   GLUCOSE 172*  --  154* 187* 134*   MG 3.00*  --  2.50  --   --    PHOS 2.7  --  2.2* 2.9  --     < > = values in this interval not displayed.        CBC/Anemia Labs: Coags:    Recent Labs   Lab 02/23/24  0010 02/24/24  0128 02/25/24  0653   WBC 11.37 10.89 9.37  9.37   HGB 8.8* 8.7* 10.0*  10.0*   HCT 28.8* 27.4* 31.2*  31.2*    118* 154  154   MCV 92.9 89.8 89.7  89.7   RDW 14.6 15.0 15.2  15.2    Recent Labs   Lab 02/22/24  0638   INR 1.5*          Impression:   CKD 4 followed up by   Status post CABG  Some increased extracellular fluid volume        Plan:   Torsemide 40 mg p.o. daily  We will follow         Leesa Lira

## 2024-02-26 LAB
ABO + RH BLD: NORMAL
ABO + RH BLD: NORMAL
ALBUMIN SERPL-MCNC: 2.5 G/DL (ref 3.4–4.8)
ALBUMIN SERPL-MCNC: 2.6 G/DL (ref 3.4–4.8)
ALBUMIN/GLOB SERPL: 0.6 RATIO (ref 1.1–2)
ALBUMIN/GLOB SERPL: 0.7 RATIO (ref 1.1–2)
ALP SERPL-CCNC: 77 UNIT/L (ref 40–150)
ALP SERPL-CCNC: 92 UNIT/L (ref 40–150)
ALT SERPL-CCNC: 12 UNIT/L (ref 0–55)
ALT SERPL-CCNC: 9 UNIT/L (ref 0–55)
AST SERPL-CCNC: 29 UNIT/L (ref 5–34)
AST SERPL-CCNC: 37 UNIT/L (ref 5–34)
BASOPHILS # BLD AUTO: 0.01 X10(3)/MCL
BASOPHILS NFR BLD AUTO: 0.1 %
BILIRUB SERPL-MCNC: 0.5 MG/DL
BILIRUB SERPL-MCNC: <0.1 MG/DL
BLD PROD TYP BPU: NORMAL
BLD PROD TYP BPU: NORMAL
BLOOD UNIT EXPIRATION DATE: NORMAL
BLOOD UNIT EXPIRATION DATE: NORMAL
BLOOD UNIT TYPE CODE: 5100
BLOOD UNIT TYPE CODE: 5100
BUN SERPL-MCNC: 37.6 MG/DL (ref 8.4–25.7)
BUN SERPL-MCNC: 38.6 MG/DL (ref 8.4–25.7)
CALCIUM SERPL-MCNC: 8.7 MG/DL (ref 8.8–10)
CALCIUM SERPL-MCNC: 9.1 MG/DL (ref 8.8–10)
CHLORIDE SERPL-SCNC: 110 MMOL/L (ref 98–107)
CHLORIDE SERPL-SCNC: 112 MMOL/L (ref 98–107)
CO2 SERPL-SCNC: 17 MMOL/L (ref 23–31)
CO2 SERPL-SCNC: 21 MMOL/L (ref 23–31)
CREAT SERPL-MCNC: 3.54 MG/DL (ref 0.73–1.18)
CREAT SERPL-MCNC: 3.55 MG/DL (ref 0.73–1.18)
CROSSMATCH INTERPRETATION: NORMAL
CROSSMATCH INTERPRETATION: NORMAL
DISPENSE STATUS: NORMAL
DISPENSE STATUS: NORMAL
EOSINOPHIL # BLD AUTO: 0.16 X10(3)/MCL (ref 0–0.9)
EOSINOPHIL NFR BLD AUTO: 1.7 %
ERYTHROCYTE [DISTWIDTH] IN BLOOD BY AUTOMATED COUNT: 15.1 % (ref 11.5–17)
GFR SERPLBLD CREATININE-BSD FMLA CKD-EPI: 18 MLS/MIN/1.73/M2
GFR SERPLBLD CREATININE-BSD FMLA CKD-EPI: 18 MLS/MIN/1.73/M2
GLOBULIN SER-MCNC: 3.5 GM/DL (ref 2.4–3.5)
GLOBULIN SER-MCNC: 4.5 GM/DL (ref 2.4–3.5)
GLUCOSE SERPL-MCNC: 121 MG/DL (ref 82–115)
GLUCOSE SERPL-MCNC: 230 MG/DL (ref 82–115)
HCT VFR BLD AUTO: 31 % (ref 42–52)
HGB BLD-MCNC: 9.4 G/DL (ref 14–18)
IMM GRANULOCYTES # BLD AUTO: 0.04 X10(3)/MCL (ref 0–0.04)
IMM GRANULOCYTES NFR BLD AUTO: 0.4 %
LYMPHOCYTES # BLD AUTO: 1.08 X10(3)/MCL (ref 0.6–4.6)
LYMPHOCYTES NFR BLD AUTO: 11.4 %
MCH RBC QN AUTO: 28.1 PG (ref 27–31)
MCHC RBC AUTO-ENTMCNC: 30.3 G/DL (ref 33–36)
MCV RBC AUTO: 92.8 FL (ref 80–94)
MONOCYTES # BLD AUTO: 1.5 X10(3)/MCL (ref 0.1–1.3)
MONOCYTES NFR BLD AUTO: 15.9 %
NEUTROPHILS # BLD AUTO: 6.67 X10(3)/MCL (ref 2.1–9.2)
NEUTROPHILS NFR BLD AUTO: 70.5 %
NRBC BLD AUTO-RTO: 0 %
PLATELET # BLD AUTO: 170 X10(3)/MCL (ref 130–400)
PMV BLD AUTO: 10.6 FL (ref 7.4–10.4)
POCT GLUCOSE: 163 MG/DL (ref 70–110)
POCT GLUCOSE: 176 MG/DL (ref 70–110)
POCT GLUCOSE: 182 MG/DL (ref 70–110)
POTASSIUM SERPL-SCNC: 3.9 MMOL/L (ref 3.5–5.1)
POTASSIUM SERPL-SCNC: 4 MMOL/L (ref 3.5–5.1)
PROT SERPL-MCNC: 6 GM/DL (ref 5.8–7.6)
PROT SERPL-MCNC: 7.1 GM/DL (ref 5.8–7.6)
RBC # BLD AUTO: 3.34 X10(6)/MCL (ref 4.7–6.1)
SODIUM SERPL-SCNC: 138 MMOL/L (ref 136–145)
SODIUM SERPL-SCNC: 141 MMOL/L (ref 136–145)
UNIT NUMBER: NORMAL
UNIT NUMBER: NORMAL
WBC # SPEC AUTO: 9.46 X10(3)/MCL (ref 4.5–11.5)

## 2024-02-26 PROCEDURE — 80053 COMPREHEN METABOLIC PANEL: CPT | Performed by: INTERNAL MEDICINE

## 2024-02-26 PROCEDURE — 99024 POSTOP FOLLOW-UP VISIT: CPT | Mod: ,,,

## 2024-02-26 PROCEDURE — 63600175 PHARM REV CODE 636 W HCPCS

## 2024-02-26 PROCEDURE — 97110 THERAPEUTIC EXERCISES: CPT

## 2024-02-26 PROCEDURE — 25000003 PHARM REV CODE 250: Performed by: PHYSICIAN ASSISTANT

## 2024-02-26 PROCEDURE — 21400001 HC TELEMETRY ROOM

## 2024-02-26 PROCEDURE — 25000003 PHARM REV CODE 250: Performed by: NURSE PRACTITIONER

## 2024-02-26 PROCEDURE — 80053 COMPREHEN METABOLIC PANEL: CPT | Performed by: NURSE PRACTITIONER

## 2024-02-26 PROCEDURE — 25000003 PHARM REV CODE 250

## 2024-02-26 PROCEDURE — 97535 SELF CARE MNGMENT TRAINING: CPT | Mod: CO

## 2024-02-26 PROCEDURE — 85025 COMPLETE CBC W/AUTO DIFF WBC: CPT | Performed by: INTERNAL MEDICINE

## 2024-02-26 PROCEDURE — 94760 N-INVAS EAR/PLS OXIMETRY 1: CPT

## 2024-02-26 PROCEDURE — 25000003 PHARM REV CODE 250: Performed by: INTERNAL MEDICINE

## 2024-02-26 RX ORDER — SODIUM BICARBONATE 650 MG/1
1300 TABLET ORAL 2 TIMES DAILY
Status: DISCONTINUED | OUTPATIENT
Start: 2024-02-26 | End: 2024-03-01 | Stop reason: HOSPADM

## 2024-02-26 RX ADMIN — ASPIRIN 81 MG: 81 TABLET, COATED ORAL at 08:02

## 2024-02-26 RX ADMIN — SUCRALFATE 1 G: 1 TABLET ORAL at 05:02

## 2024-02-26 RX ADMIN — FOLIC ACID 1 MG: 1 TABLET ORAL at 08:02

## 2024-02-26 RX ADMIN — SODIUM BICARBONATE 650 MG TABLET 1300 MG: at 10:02

## 2024-02-26 RX ADMIN — FAMOTIDINE 20 MG: 10 INJECTION, SOLUTION INTRAVENOUS at 08:02

## 2024-02-26 RX ADMIN — METOPROLOL TARTRATE 12.5 MG: 25 TABLET, FILM COATED ORAL at 10:02

## 2024-02-26 RX ADMIN — ENOXAPARIN SODIUM 40 MG: 40 INJECTION SUBCUTANEOUS at 05:02

## 2024-02-26 RX ADMIN — DOCUSATE SODIUM 200 MG: 100 CAPSULE, LIQUID FILLED ORAL at 08:02

## 2024-02-26 RX ADMIN — METOPROLOL TARTRATE 12.5 MG: 25 TABLET, FILM COATED ORAL at 08:02

## 2024-02-26 RX ADMIN — INSULIN ASPART 1 UNITS: 100 INJECTION, SOLUTION INTRAVENOUS; SUBCUTANEOUS at 10:02

## 2024-02-26 RX ADMIN — SUCRALFATE 1 G: 1 TABLET ORAL at 10:02

## 2024-02-26 RX ADMIN — DOCUSATE SODIUM 200 MG: 100 CAPSULE, LIQUID FILLED ORAL at 10:02

## 2024-02-26 RX ADMIN — TORSEMIDE 40 MG: 20 TABLET ORAL at 08:02

## 2024-02-26 RX ADMIN — INSULIN ASPART 2 UNITS: 100 INJECTION, SOLUTION INTRAVENOUS; SUBCUTANEOUS at 11:02

## 2024-02-26 RX ADMIN — INSULIN ASPART 2 UNITS: 100 INJECTION, SOLUTION INTRAVENOUS; SUBCUTANEOUS at 05:02

## 2024-02-26 RX ADMIN — MUPIROCIN: 20 OINTMENT TOPICAL at 08:02

## 2024-02-26 NOTE — PROGRESS NOTES
"CT SURGERY PROGRESS NOTE  Rustam Matthews  63 y.o.  1960    Patients Procedure: Procedure(s) (LRB):  CORONARY ARTERY BYPASS GRAFT (CABG) (N/A)  SURGICAL PROCUREMENT, VEIN, ENDOSCOPIC (N/A)  EXCLUSION, LEFT ATRIAL APPENDAGE (N/A)  ENDARTERECTOMY (N/A)    Subjective  Interval History: Patient is postoperative day 4 from CABG x 3 and ligation of left atrial appendage.  No acute events overnight.  Sitting up on the side of the bed this morning.  No specific complaints just reports "not feeling well".  Chest tubes removed yesterday with stable appearance of repeat CXR this morning.     Review of Systems   Constitutional:  Positive for malaise/fatigue. Negative for chills and fever.   Respiratory:  Negative for cough, shortness of breath and wheezing.    Cardiovascular:  Positive for chest pain (incisional). Negative for palpitations and leg swelling.   Gastrointestinal:  Negative for nausea and vomiting.       Medication List  Infusions   insulin regular 1 units/mL infusion orderable (CTS POST-OP) Stopped (02/23/24 1207)    loperamide       Scheduled   aspirin  81 mg Oral Daily    docusate sodium  200 mg Oral BID    enoxparin  40 mg Subcutaneous Daily    famotidine (PF)  20 mg Intravenous Daily    folic acid  1 mg Oral Daily    metoprolol tartrate  12.5 mg Oral BID    mupirocin   Nasal BID    sodium bicarbonate  1,300 mg Oral BID    sucralfate  1 g Oral QID (AC & HS)    torsemide  40 mg Oral Daily       Objective:  Recent Vitals:  Temp:  [97.5 °F (36.4 °C)-98.7 °F (37.1 °C)] 98.7 °F (37.1 °C)  Pulse:  [79-97] 85  Resp:  [3-20] 3  SpO2:  [94 %-98 %] 97 %  BP: (104-138)/(59-76) 138/72    Physical Exam  Constitutional:       General: He is not in acute distress.     Appearance: He is obese. He is not ill-appearing.   HENT:      Head: Normocephalic and atraumatic.   Cardiovascular:      Rate and Rhythm: Normal rate and regular rhythm.      Pulses: Normal pulses.      Heart sounds: Normal heart sounds. No murmur heard.    "  No friction rub. No gallop.   Pulmonary:      Effort: Pulmonary effort is normal. No respiratory distress.      Breath sounds: Rhonchi present. No wheezing or rales.   Abdominal:      General: Bowel sounds are normal. There is no distension.      Palpations: Abdomen is soft.   Musculoskeletal:      Right lower leg: Edema present.      Left lower leg: Edema present.   Skin:     General: Skin is warm and dry.      Comments: Median sternotomy chad - scant amt of serous fluid draining from top of incision.  Wound intact.    Neurological:      General: No focal deficit present.      Mental Status: He is alert and oriented to person, place, and time.   Psychiatric:         Mood and Affect: Mood normal.         Behavior: Behavior normal.          I/O last 24 hrs:  Intake/Output - Last 3 Shifts         02/24 0700  02/25 0659 02/25 0700 02/26 0659 02/26 0700 02/27 0659    P.O.  420     I.V. (mL/kg) 176.7 (1.4)      IV Piggyback       Total Intake(mL/kg) 176.7 (1.4) 420 (3.2)     Urine (mL/kg/hr) 375 (0.1) 900 (0.3)     Emesis/NG output       Stool 0 0     Chest Tube 60      Total Output 435 900     Net -258.3 -480            Stool Occurrence 0 x 0 x             Labs  CBC:   Recent Labs   Lab 02/26/24 0411   WBC 9.46   RBC 3.34*   HGB 9.4*   HCT 31.0*      MCV 92.8   MCH 28.1   MCHC 30.3*     CMP:   Recent Labs   Lab 02/26/24 0411   CALCIUM 8.7*   ALBUMIN 2.5*      K 4.0   CO2 17*   BUN 37.6*   CREATININE 3.54*   ALKPHOS 77   ALT 9   AST 29   BILITOT <0.1     LFTs:   Recent Labs   Lab 02/26/24 0411   ALT 9   AST 29   ALKPHOS 77   BILITOT <0.1   ALBUMIN 2.5*     All pertinent labs from the last 24 hours have been reviewed.      Imaging:   CXR: X-Ray Chest PA And Lateral    Result Date: 2/26/2024  No acute chest disease is identified. Electronically signed by: Yoan Segal Date:    02/26/2024 Time:    08:36   I have reviewed all pertinent imaging results/findings within the past 24  hours.        ASSESSMENT/PLAN:    Severe coronary artery disease  -s/p CABG and LLAA  DM  HTN  CKD  -nephrology following    -CXR with bilateral atelectasis.  Patient encouraged to ambulate and use IS regularly  -Nephrology recs noted and appreciated  -Cover incision with dry gauze.  Communicated with nursing staff  -Continue to work with PT / OT  -May need rehab at discharge due to deconditioned state.  Case management consulted for assistance with this.     Case and plan of care discussed with JUSTO Elena

## 2024-02-26 NOTE — PROGRESS NOTES
[facility] Inpatient Progress Note      HPI:     Patient Name: Rustam Matthews  Admission Date: 2/22/2024  Hospital Length of Stay: 4 days  Code Status: Full Code   Attending Physician: Silvana Bauman MD   Primary Care Physician: Vasquez Rodriguez MD  Principal Problem:<principal problem not specified>      Today patient seen and examined  Labs, recent events, imaging and medications reviewed for this hospital stay  Feesl ok. On room air with O2 sat 98%. Torsemide started. C/o leg swelling.       Review of Systems:   Sitting in chair on oxygen no dyspnea no abdominal pain no nausea vomiting urine output maintained  Positive chest wall wound      Medications:   Scheduled Meds:   aspirin  81 mg Oral Daily    docusate sodium  200 mg Oral BID    enoxparin  40 mg Subcutaneous Daily    famotidine (PF)  20 mg Intravenous Daily    folic acid  1 mg Oral Daily    metoprolol tartrate  12.5 mg Oral BID    mupirocin   Nasal BID    sucralfate  1 g Oral QID (AC & HS)    torsemide  40 mg Oral Daily     Continuous Infusions:   insulin regular 1 units/mL infusion orderable (CTS POST-OP) Stopped (02/23/24 1207)    loperamide           Vitals:     Vitals:    02/26/24 0000 02/26/24 0338 02/26/24 0635 02/26/24 0800   BP: 112/62 131/76  117/65   Pulse: 84 79  89   Resp: 20      Temp: 98.2 °F (36.8 °C) 98.2 °F (36.8 °C)  98 °F (36.7 °C)   TempSrc: Oral Oral  Oral   SpO2: 98% 95%  (!) 94%   Weight:   129.4 kg (285 lb 3.2 oz)    Height:             I/O last 3 completed shifts:  In: 420 [P.O.:420]  Out: 930 [Urine:900; Chest Tube:30]    Intake/Output Summary (Last 24 hours) at 2/26/2024 0906  Last data filed at 2/26/2024 0451  Gross per 24 hour   Intake 420 ml   Output 600 ml   Net -180 ml         Physical Exam:   General: no acute distress, awake, alert  Eyes: PERRLA, EOMI, conjunctiva clear, eyelids without swelling  HENT: atraumatic, oropharynx and nasal mucosa patent  Neck: full ROM, no JVD, no thyromegaly or  lymphadenopathy  Respiratory: equal, unlabored, bill bilateral rhonchi   Cardiovascular: RRR without rub; BL radial and pedal pulses felt  Edema:  Trace to +1 peripheral  Gastrointestinal: soft, non-tender, non-distended; positive bowel sounds; no masses to palpation  Musculoskeletal: without new limitation or discomfort  Integumentary: warm, dry; no rashes,   Neurological: oriented, appropriate, no acute deficits        Labs:     Chemistries:   Recent Labs   Lab 02/22/24  0638 02/22/24  1507 02/23/24  0144 02/24/24  0128 02/25/24  0653 02/26/24  0411     --  140 138 140 141   K 3.4*   < > 3.7 4.4 4.4 4.0   CO2 24  --  21* 21* 19* 17*   BUN 19.4  --  19.3 21.0 27.6* 37.6*   CREATININE 2.69*  --  2.75* 2.82* 3.00* 3.54*   CALCIUM 9.7  --  8.3* 8.1* 8.7* 8.7*   BILITOT  --   --  0.3 0.5 0.5 <0.1   ALKPHOS  --   --  60 70 87 77   ALT  --   --  9 8 7 9   AST  --   --  34 34 27 29   GLUCOSE 172*  --  154* 187* 134* 121*   MG 3.00*  --  2.50  --   --   --    PHOS 2.7  --  2.2* 2.9  --   --     < > = values in this interval not displayed.          CBC/Anemia Labs: Coags:    Recent Labs   Lab 02/24/24  0128 02/25/24  0653 02/26/24  0411   WBC 10.89 9.37  9.37 9.46   HGB 8.7* 10.0*  10.0* 9.4*   HCT 27.4* 31.2*  31.2* 31.0*   * 154  154 170   MCV 89.8 89.7  89.7 92.8   RDW 15.0 15.2  15.2 15.1      Recent Labs   Lab 02/22/24  0638   INR 1.5*            Impression:   CKD 4 followed up by   Status post CABG  Metabolic Acidosis           Plan:   Continue torsemide  Start sodium bicarbonate 1300 mg BID         [facility]

## 2024-02-26 NOTE — PT/OT/SLP PROGRESS
Occupational Therapy   Treatment    Name: Rustam Matthews  MRN: [id number]  Admitting Diagnosis:  <principal problem not specified>  4 Days Post-Op    Recommendations:     Recommended therapy intensity at discharge: Moderate Intensity Therapy   Discharge Equipment Recommendations:  to be determined by next level of care  Barriers to discharge:       Assessment:     Rustam Matthews is a 63 y.o. male with a medical diagnosis of <principal problem not specified>.  Performance deficits affecting function are weakness, impaired endurance, impaired self care skills, impaired functional mobility, gait instability, impaired balance, decreased upper extremity function, decreased lower extremity function, pain, impaired cardiopulmonary response to activity.     Rehab Prognosis:  Fair; patient would benefit from acute skilled OT services to address these deficits and reach maximum level of function.       Plan:     Patient to be seen 4 x/week to address the above listed problems via self-care/home management  Plan of Care Expires: 03/23/24  Plan of Care Reviewed with: patient    Subjective     Pain/Comfort:       Objective:     Communicated with: RN prior to session.  Patient found HOB elevated with   upon OT entry to room.    General Precautions: Standard, sternal, fall    Orthopedic Precautions:N/A  Braces: N/A     Occupational Performance:     Bed Mobility:    Patient completed Supine to Sit with moderate assistance     Functional Mobility/Transfers:  Patient completed Sit <> Stand Transfer with moderate assistance  with  no assistive device   Patient completed Bed <> Chair Transfer using Step Transfer technique with minimum assistance with rolling walker  Functional Mobility: no LOB    Activities of Daily Living:  Lower Body Dressing: total assistance donning/doffing B socks    Patient Education:  Patient provided with verbal education education regarding OT role/goals/POC.  Understanding was verbalized.      Patient left HOB  elevated with all lines intact and call button in reach    GOALS:   Multidisciplinary Problems       Occupational Therapy Goals          Problem: Occupational Therapy    Goal Priority Disciplines Outcome Interventions   Occupational Therapy Goal     OT, PT/OT Ongoing, Progressing    Description: Goals to be met by: 2/23/24     Patient will increase functional independence with ADLs by performing:    UE Dressing with Minimal Assistance.  LE Dressing with Minimal Assistance.  Toileting from toilet with Minimal Assistance for hygiene and clothing management.   Toilet transfer to toilet with Minimal Assistance.                         Time Tracking:     OT Date of Treatment: 02/26/24  OT Start Time: 1047  OT Stop Time: 1118  OT Total Time (min): 31 min    Billable Minutes:Self Care/Home Management 1  Therapeutic Activity 1    OT/FIORELLA: FIORELLA     Number of FIORELLA visits since last OT visit: 1 2/26/2024

## 2024-02-26 NOTE — PROGRESS NOTES
02/26/24 1055   Pre Exercise Vitals   /73   Pulse 73   Supplemental O2? No   SpO2 95 %   During Exercise Vitals   Supplemental O2? No   Distance Walked 45 feet   Post Exercise Vitals   /72   Pulse 85   Supplemental O2? No   SpO2 97 %   Modality   Modality   (rollator)     Communicated with nurse prior to and post activity.   Assist per me and OT Alyson.  Encouragement needed for activity.  Mod assist x2 from bed to standing.  Verbal reminders for sternal precautions needed.  Chair followed for safety.  Gait slow.  Denies HODGES, unable to obtain 02 sat during ambulation using 2 monitors (fingers cold).  02 sat 97% upon return to room.  Up in chair post ambulation.  Encouraged increased activity and use of IS.   IS placed within reach, call bell within reach.  Nurse informed of bleeding from incision.

## 2024-02-27 LAB
ALBUMIN SERPL-MCNC: 2.3 G/DL (ref 3.4–4.8)
ALBUMIN/GLOB SERPL: 0.6 RATIO (ref 1.1–2)
ALP SERPL-CCNC: 80 UNIT/L (ref 40–150)
ALT SERPL-CCNC: 24 UNIT/L (ref 0–55)
AST SERPL-CCNC: 51 UNIT/L (ref 5–34)
BASOPHILS # BLD AUTO: 0.02 X10(3)/MCL
BASOPHILS NFR BLD AUTO: 0.3 %
BILIRUB SERPL-MCNC: 0.4 MG/DL
BUN SERPL-MCNC: 33.8 MG/DL (ref 8.4–25.7)
CALCIUM SERPL-MCNC: 8.4 MG/DL (ref 8.8–10)
CHLORIDE SERPL-SCNC: 109 MMOL/L (ref 98–107)
CO2 SERPL-SCNC: 23 MMOL/L (ref 23–31)
CREAT SERPL-MCNC: 3.43 MG/DL (ref 0.73–1.18)
EOSINOPHIL # BLD AUTO: 0.21 X10(3)/MCL (ref 0–0.9)
EOSINOPHIL NFR BLD AUTO: 3 %
ERYTHROCYTE [DISTWIDTH] IN BLOOD BY AUTOMATED COUNT: 15.1 % (ref 11.5–17)
GFR SERPLBLD CREATININE-BSD FMLA CKD-EPI: 19 MLS/MIN/1.73/M2
GLOBULIN SER-MCNC: 4 GM/DL (ref 2.4–3.5)
GLUCOSE SERPL-MCNC: 169 MG/DL (ref 82–115)
HCT VFR BLD AUTO: 29.6 % (ref 42–52)
HGB BLD-MCNC: 9.1 G/DL (ref 14–18)
IMM GRANULOCYTES # BLD AUTO: 0.03 X10(3)/MCL (ref 0–0.04)
IMM GRANULOCYTES NFR BLD AUTO: 0.4 %
LYMPHOCYTES # BLD AUTO: 1.09 X10(3)/MCL (ref 0.6–4.6)
LYMPHOCYTES NFR BLD AUTO: 15.5 %
MAGNESIUM SERPL-MCNC: 2.5 MG/DL (ref 1.6–2.6)
MCH RBC QN AUTO: 28.1 PG (ref 27–31)
MCHC RBC AUTO-ENTMCNC: 30.7 G/DL (ref 33–36)
MCV RBC AUTO: 91.4 FL (ref 80–94)
MONOCYTES # BLD AUTO: 0.86 X10(3)/MCL (ref 0.1–1.3)
MONOCYTES NFR BLD AUTO: 12.2 %
NEUTROPHILS # BLD AUTO: 4.82 X10(3)/MCL (ref 2.1–9.2)
NEUTROPHILS NFR BLD AUTO: 68.6 %
NRBC BLD AUTO-RTO: 0 %
PLATELET # BLD AUTO: 205 X10(3)/MCL (ref 130–400)
PMV BLD AUTO: 10.3 FL (ref 7.4–10.4)
POCT GLUCOSE: 175 MG/DL (ref 70–110)
POCT GLUCOSE: 185 MG/DL (ref 70–110)
POCT GLUCOSE: 222 MG/DL (ref 70–110)
POTASSIUM SERPL-SCNC: 3.3 MMOL/L (ref 3.5–5.1)
PROT SERPL-MCNC: 6.3 GM/DL (ref 5.8–7.6)
RBC # BLD AUTO: 3.24 X10(6)/MCL (ref 4.7–6.1)
SODIUM SERPL-SCNC: 139 MMOL/L (ref 136–145)
WBC # SPEC AUTO: 7.03 X10(3)/MCL (ref 4.5–11.5)

## 2024-02-27 PROCEDURE — 80053 COMPREHEN METABOLIC PANEL: CPT | Performed by: THORACIC SURGERY (CARDIOTHORACIC VASCULAR SURGERY)

## 2024-02-27 PROCEDURE — 63600175 PHARM REV CODE 636 W HCPCS

## 2024-02-27 PROCEDURE — 93010 ELECTROCARDIOGRAM REPORT: CPT | Mod: ,,, | Performed by: INTERNAL MEDICINE

## 2024-02-27 PROCEDURE — 21400001 HC TELEMETRY ROOM

## 2024-02-27 PROCEDURE — 85025 COMPLETE CBC W/AUTO DIFF WBC: CPT | Performed by: NURSE PRACTITIONER

## 2024-02-27 PROCEDURE — 83735 ASSAY OF MAGNESIUM: CPT | Performed by: THORACIC SURGERY (CARDIOTHORACIC VASCULAR SURGERY)

## 2024-02-27 PROCEDURE — 25000003 PHARM REV CODE 250: Performed by: INTERNAL MEDICINE

## 2024-02-27 PROCEDURE — 99232 SBSQ HOSP IP/OBS MODERATE 35: CPT | Mod: ,,, | Performed by: INTERNAL MEDICINE

## 2024-02-27 PROCEDURE — 99024 POSTOP FOLLOW-UP VISIT: CPT | Mod: ,,, | Performed by: PHYSICIAN ASSISTANT

## 2024-02-27 PROCEDURE — 93005 ELECTROCARDIOGRAM TRACING: CPT

## 2024-02-27 PROCEDURE — 25000003 PHARM REV CODE 250: Performed by: PHYSICIAN ASSISTANT

## 2024-02-27 PROCEDURE — 25000003 PHARM REV CODE 250

## 2024-02-27 PROCEDURE — 25000003 PHARM REV CODE 250: Performed by: NURSE PRACTITIONER

## 2024-02-27 RX ORDER — CLOPIDOGREL BISULFATE 75 MG/1
75 TABLET ORAL DAILY
Status: DISCONTINUED | OUTPATIENT
Start: 2024-02-28 | End: 2024-03-01 | Stop reason: HOSPADM

## 2024-02-27 RX ORDER — AMIODARONE HCL/D5W 450 MG/250
0.5 PLASTIC BAG, INJECTION (ML) INTRAVENOUS CONTINUOUS
Status: DISCONTINUED | OUTPATIENT
Start: 2024-02-27 | End: 2024-02-28

## 2024-02-27 RX ORDER — AMIODARONE HCL/D5W 450 MG/250
1 PLASTIC BAG, INJECTION (ML) INTRAVENOUS CONTINUOUS
Status: DISPENSED | OUTPATIENT
Start: 2024-02-27 | End: 2024-02-27

## 2024-02-27 RX ORDER — POTASSIUM CHLORIDE 20 MEQ/1
40 TABLET, EXTENDED RELEASE ORAL ONCE
Status: COMPLETED | OUTPATIENT
Start: 2024-02-27 | End: 2024-02-27

## 2024-02-27 RX ADMIN — SUCRALFATE 1 G: 1 TABLET ORAL at 05:02

## 2024-02-27 RX ADMIN — SODIUM BICARBONATE 650 MG TABLET 1300 MG: at 09:02

## 2024-02-27 RX ADMIN — DOCUSATE SODIUM 200 MG: 100 CAPSULE, LIQUID FILLED ORAL at 09:02

## 2024-02-27 RX ADMIN — TORSEMIDE 40 MG: 20 TABLET ORAL at 09:02

## 2024-02-27 RX ADMIN — AMIODARONE HYDROCHLORIDE 150 MG: 1.5 INJECTION, SOLUTION INTRAVENOUS at 05:02

## 2024-02-27 RX ADMIN — FAMOTIDINE 20 MG: 10 INJECTION, SOLUTION INTRAVENOUS at 09:02

## 2024-02-27 RX ADMIN — INSULIN ASPART 2 UNITS: 100 INJECTION, SOLUTION INTRAVENOUS; SUBCUTANEOUS at 11:02

## 2024-02-27 RX ADMIN — INSULIN ASPART 2 UNITS: 100 INJECTION, SOLUTION INTRAVENOUS; SUBCUTANEOUS at 09:02

## 2024-02-27 RX ADMIN — AMIODARONE HYDROCHLORIDE 1 MG/MIN: 50 INJECTION, SOLUTION INTRAVENOUS at 05:02

## 2024-02-27 RX ADMIN — FOLIC ACID 1 MG: 1 TABLET ORAL at 09:02

## 2024-02-27 RX ADMIN — METOPROLOL TARTRATE 12.5 MG: 25 TABLET, FILM COATED ORAL at 09:02

## 2024-02-27 RX ADMIN — SUCRALFATE 1 G: 1 TABLET ORAL at 09:02

## 2024-02-27 RX ADMIN — ENOXAPARIN SODIUM 40 MG: 40 INJECTION SUBCUTANEOUS at 05:02

## 2024-02-27 RX ADMIN — ASPIRIN 81 MG: 81 TABLET, COATED ORAL at 09:02

## 2024-02-27 RX ADMIN — POTASSIUM CHLORIDE 40 MEQ: 1500 TABLET, EXTENDED RELEASE ORAL at 05:02

## 2024-02-27 RX ADMIN — INSULIN ASPART 2 UNITS: 100 INJECTION, SOLUTION INTRAVENOUS; SUBCUTANEOUS at 05:02

## 2024-02-27 NOTE — PT/OT/SLP PROGRESS
Occupational Therapy      Patient Name:  Rustam Matthews   MRN:  [id number]    Attempting to see patient at this time, however patient refusing to participate in therapy session despite max education and motivation. RN notified. Will follow up as able.     2/27/2024

## 2024-02-27 NOTE — PROGRESS NOTES
[facility] Inpatient Progress Note      HPI:     Patient Name: Rustam Matthews  Admission Date: 2/22/2024  Hospital Length of Stay: 5 days  Code Status: Full Code   Attending Physician: Silvana Bauman MD   Primary Care Physician: Vasquez Rodriguez MD  Principal Problem:<principal problem not specified>      Today patient seen and examined  Labs, recent events, imaging and medications reviewed for this hospital stay  Feels better  No dyspnea  Good urine output    Review of Systems:   Refusing therapy  No NV  No dyspnea  Otherwise no change      Medications:   Scheduled Meds:   aspirin  81 mg Oral Daily    docusate sodium  200 mg Oral BID    enoxparin  40 mg Subcutaneous Daily    famotidine (PF)  20 mg Intravenous Daily    folic acid  1 mg Oral Daily    metoprolol tartrate  12.5 mg Oral BID    sodium bicarbonate  1,300 mg Oral BID    sucralfate  1 g Oral QID (AC & HS)    torsemide  40 mg Oral Daily     Continuous Infusions:   insulin regular 1 units/mL infusion orderable (CTS POST-OP) Stopped (02/23/24 1207)    loperamide           Vitals:     Vitals:    02/27/24 0424 02/27/24 0500 02/27/24 0755 02/27/24 0800   BP: (!) 140/74  129/72    Pulse: 79  83 88   Resp:   20    Temp: 97.8 °F (36.6 °C)  98.8 °F (37.1 °C)    TempSrc: Oral  Oral    SpO2: (!) 94%  95% 98%   Weight:  128.2 kg (282 lb 11.2 oz)     Height:             I/O last 3 completed shifts:  In: 1200 [P.O.:1200]  Out: 1700 [Urine:1700]    Intake/Output Summary (Last 24 hours) at 2/27/2024 1003  Last data filed at 2/27/2024 0813  Gross per 24 hour   Intake 960 ml   Output 1550 ml   Net -590 ml       Physical Exam:   General: no acute distress, awake, alert  Eyes: PERRLA, EOMI, conjunctiva clear, eyelids without swelling  HENT: atraumatic, oropharynx and nasal mucosa patent  Neck: full ROM, no JVD, no thyromegaly or lymphadenopathy  Respiratory: basilar rhonchi, chest wall dressing  Cardiovascular: RRR without rub; BL radial and pedal pulses felt  Edema: +2  lower ext  Gastrointestinal: soft, non-tender, non-distended; positive bowel sounds; no masses to palpation  Musculoskeletal:without limitation or discomfort  Neurological: oriented, appropriate, no acute deficits      Labs:     Chemistries:   Recent Labs   Lab 02/22/24  0638 02/22/24  1507 02/23/24  0144 02/24/24  0128 02/25/24  0653 02/26/24  0411 02/26/24  1230     --  140 138 140 141 138   K 3.4*   < > 3.7 4.4 4.4 4.0 3.9   CO2 24  --  21* 21* 19* 17* 21*   BUN 19.4  --  19.3 21.0 27.6* 37.6* 38.6*   CREATININE 2.69*  --  2.75* 2.82* 3.00* 3.54* 3.55*   CALCIUM 9.7  --  8.3* 8.1* 8.7* 8.7* 9.1   BILITOT  --    < > 0.3 0.5 0.5 <0.1 0.5   ALKPHOS  --    < > 60 70 87 77 92   ALT  --    < > 9 8 7 9 12   AST  --    < > 34 34 27 29 37*   GLUCOSE 172*  --  154* 187* 134* 121* 230*   MG 3.00*  --  2.50  --   --   --   --    PHOS 2.7  --  2.2* 2.9  --   --   --     < > = values in this interval not displayed.        CBC/Anemia Labs: Coags:    Recent Labs   Lab 02/25/24  0653 02/26/24  0411 02/27/24  0532   WBC 9.37  9.37 9.46 7.03   HGB 10.0*  10.0* 9.4* 9.1*   HCT 31.2*  31.2* 31.0* 29.6*     154 170 205   MCV 89.7  89.7 92.8 91.4   RDW 15.2  15.2 15.1 15.1    Recent Labs   Lab 02/22/24  0638   INR 1.5*          Impression:     CKD4 FU Dr Olson  Some increase in cr related to torsemide    Plan:      NO ACTIVE RENAL ISSUES  NO INDICATIONS FOR HD  Pt to FU with DR Olson to start planning for HD  Will HOLD torsemide for now  Nothing otherwise for me to offer  Will sign off    Leesa Lira

## 2024-02-27 NOTE — PT/OT/SLP PROGRESS
Physical Therapy      Patient Name:  Rustam Matthews   MRN:  [id number]    Attempted to see patient at 930 for treatment session, however, patient refused to participate with therapy today despite max education and motivation. Patient not seen today secondary to Patient unwilling to participate. Will follow-up tomorrow as scheduling allows.

## 2024-02-27 NOTE — NURSING
Nurses Note -- 4 Eyes      2/26/2024   6:06 PM      Skin assessed during: Assessment      [x] No Altered Skin Integrity Present    []Prevention Measures Documented      [] Yes- Altered Skin Integrity Present or Discovered   [] LDA Added if Not in Epic (Describe Wound)   [] New Altered Skin Integrity was Present on Admit and Documented in LDA   [] Wound Image Taken    Wound Care Consulted? No    Attending Nurse:  Opal Badillo RN/Staff Member:   Tiny

## 2024-02-27 NOTE — PROGRESS NOTES
"CT SURGERY PROGRESS NOTE  Rustam Matthews  63 y.o.  1960    Patients Procedure: Procedure(s) (LRB):  CORONARY ARTERY BYPASS GRAFT (CABG) (N/A)  SURGICAL PROCUREMENT, VEIN, ENDOSCOPIC (N/A)  EXCLUSION, LEFT ATRIAL APPENDAGE (N/A)  ENDARTERECTOMY (N/A)    Subjective  Interval History: Patient is postoperative day 5 from CABG x 3 and ligation of left atrial appendage.  No acute events overnight.  Sitting up on the side of the bed this morning.  No specific complaints just reports "not feeling well".      Review of Systems   Constitutional:  Positive for malaise/fatigue. Negative for chills and fever.   Respiratory:  Negative for cough, shortness of breath and wheezing.    Cardiovascular:  Positive for chest pain (incisional). Negative for palpitations and leg swelling.   Gastrointestinal:  Negative for nausea and vomiting.       Medication List  Infusions   insulin regular 1 units/mL infusion orderable (CTS POST-OP) Stopped (02/23/24 1207)    loperamide       Scheduled   aspirin  81 mg Oral Daily    docusate sodium  200 mg Oral BID    enoxparin  40 mg Subcutaneous Daily    famotidine (PF)  20 mg Intravenous Daily    folic acid  1 mg Oral Daily    metoprolol tartrate  12.5 mg Oral BID    sodium bicarbonate  1,300 mg Oral BID    sucralfate  1 g Oral QID (AC & HS)    torsemide  40 mg Oral Daily       Objective:  Recent Vitals:  Temp:  [97.8 °F (36.6 °C)-98.8 °F (37.1 °C)] 98.8 °F (37.1 °C)  Pulse:  [69-88] 88  Resp:  [18-20] 20  SpO2:  [94 %-100 %] 98 %  BP: (129-140)/(71-78) 129/72    Physical Exam  Constitutional:       General: He is not in acute distress.     Appearance: He is obese. He is not ill-appearing.   HENT:      Head: Normocephalic and atraumatic.   Cardiovascular:      Rate and Rhythm: Normal rate and regular rhythm.      Pulses: Normal pulses.      Heart sounds: Normal heart sounds. No murmur heard.     No friction rub. No gallop.   Pulmonary:      Effort: Pulmonary effort is normal. No respiratory " distress.      Breath sounds: Rhonchi present. No wheezing or rales.   Abdominal:      General: Bowel sounds are normal. There is no distension.      Palpations: Abdomen is soft.   Musculoskeletal:      Right lower leg: Edema present.      Left lower leg: Edema present.   Skin:     General: Skin is warm and dry.      Comments: Median sternotomy chad - scant amt of serous fluid draining from top of incision.  Wound intact.    Neurological:      General: No focal deficit present.      Mental Status: He is alert and oriented to person, place, and time.   Psychiatric:         Mood and Affect: Mood normal.         Behavior: Behavior normal.          I/O last 24 hrs:  Intake/Output - Last 3 Shifts         02/25 0700  02/26 0659 02/26 0700 02/27 0659 02/27 0700 02/28 0659    P.O. 420 960     I.V. (mL/kg)       Total Intake(mL/kg) 420 (3.2) 960 (7.5)     Urine (mL/kg/hr) 900 (0.3) 1400 (0.5) 150 (0.4)    Stool 0 0     Chest Tube       Total Output 900 1400 150    Net -480 -440 -150           Urine Occurrence  5 x     Stool Occurrence 0 x 0 x             Labs  CBC:   Recent Labs   Lab 02/27/24  0532   WBC 7.03   RBC 3.24*   HGB 9.1*   HCT 29.6*      MCV 91.4   MCH 28.1   MCHC 30.7*       CMP:   Recent Labs   Lab 02/26/24  1230   CALCIUM 9.1   ALBUMIN 2.6*      K 3.9   CO2 21*   BUN 38.6*   CREATININE 3.55*   ALKPHOS 92   ALT 12   AST 37*   BILITOT 0.5       LFTs:   Recent Labs   Lab 02/26/24  1230   ALT 12   AST 37*   ALKPHOS 92   BILITOT 0.5   ALBUMIN 2.6*       All pertinent labs from the last 24 hours have been reviewed.      Imaging:   CXR: X-Ray Chest PA And Lateral    Result Date: 2/26/2024  No acute chest disease is identified. Electronically signed by: Yoan Segal Date:    02/26/2024 Time:    08:36   I have reviewed all pertinent imaging results/findings within the past 24 hours.        ASSESSMENT/PLAN:    Severe coronary artery disease  -s/p CABG and LLAA  DM  HTN  CKD  -nephrology  following    -Nephrology recs noted and appreciated  -May need rehab at discharge due to deconditioned state but pt is unmotivated  - Case management consulted for assistance with this.     Case and plan of care discussed with Dr. Mitul Padilla PAC

## 2024-02-28 LAB
ALBUMIN SERPL-MCNC: 2.7 G/DL (ref 3.4–4.8)
ALBUMIN/GLOB SERPL: 0.7 RATIO (ref 1.1–2)
ALP SERPL-CCNC: 96 UNIT/L (ref 40–150)
ALT SERPL-CCNC: 25 UNIT/L (ref 0–55)
AST SERPL-CCNC: 41 UNIT/L (ref 5–34)
BASOPHILS # BLD AUTO: 0.02 X10(3)/MCL
BASOPHILS NFR BLD AUTO: 0.3 %
BILIRUB SERPL-MCNC: 0.5 MG/DL
BUN SERPL-MCNC: 31.7 MG/DL (ref 8.4–25.7)
CALCIUM SERPL-MCNC: 8.3 MG/DL (ref 8.8–10)
CHLORIDE SERPL-SCNC: 107 MMOL/L (ref 98–107)
CO2 SERPL-SCNC: 23 MMOL/L (ref 23–31)
CREAT SERPL-MCNC: 3.46 MG/DL (ref 0.73–1.18)
EOSINOPHIL # BLD AUTO: 0.23 X10(3)/MCL (ref 0–0.9)
EOSINOPHIL NFR BLD AUTO: 3.3 %
ERYTHROCYTE [DISTWIDTH] IN BLOOD BY AUTOMATED COUNT: 15.1 % (ref 11.5–17)
GFR SERPLBLD CREATININE-BSD FMLA CKD-EPI: 19 MLS/MIN/1.73/M2
GLOBULIN SER-MCNC: 3.8 GM/DL (ref 2.4–3.5)
GLUCOSE SERPL-MCNC: 154 MG/DL (ref 82–115)
HCT VFR BLD AUTO: 30.3 % (ref 42–52)
HGB BLD-MCNC: 9.2 G/DL (ref 14–18)
IMM GRANULOCYTES # BLD AUTO: 0.02 X10(3)/MCL (ref 0–0.04)
IMM GRANULOCYTES NFR BLD AUTO: 0.3 %
LYMPHOCYTES # BLD AUTO: 1.08 X10(3)/MCL (ref 0.6–4.6)
LYMPHOCYTES NFR BLD AUTO: 15.6 %
MCH RBC QN AUTO: 28.1 PG (ref 27–31)
MCHC RBC AUTO-ENTMCNC: 30.4 G/DL (ref 33–36)
MCV RBC AUTO: 92.7 FL (ref 80–94)
MONOCYTES # BLD AUTO: 0.7 X10(3)/MCL (ref 0.1–1.3)
MONOCYTES NFR BLD AUTO: 10.1 %
NEUTROPHILS # BLD AUTO: 4.86 X10(3)/MCL (ref 2.1–9.2)
NEUTROPHILS NFR BLD AUTO: 70.4 %
NRBC BLD AUTO-RTO: 0 %
OHS QRS DURATION: 98 MS
OHS QTC CALCULATION: 495 MS
PLATELET # BLD AUTO: 261 X10(3)/MCL (ref 130–400)
PMV BLD AUTO: 10.9 FL (ref 7.4–10.4)
POCT GLUCOSE: 136 MG/DL (ref 70–110)
POCT GLUCOSE: 150 MG/DL (ref 70–110)
POCT GLUCOSE: 187 MG/DL (ref 70–110)
POCT GLUCOSE: 189 MG/DL (ref 70–110)
POCT GLUCOSE: 269 MG/DL (ref 70–110)
POTASSIUM SERPL-SCNC: 3.6 MMOL/L (ref 3.5–5.1)
PROT SERPL-MCNC: 6.5 GM/DL (ref 5.8–7.6)
RBC # BLD AUTO: 3.27 X10(6)/MCL (ref 4.7–6.1)
SODIUM SERPL-SCNC: 142 MMOL/L (ref 136–145)
WBC # SPEC AUTO: 6.91 X10(3)/MCL (ref 4.5–11.5)

## 2024-02-28 PROCEDURE — 21400001 HC TELEMETRY ROOM

## 2024-02-28 PROCEDURE — 63600175 PHARM REV CODE 636 W HCPCS

## 2024-02-28 PROCEDURE — 99024 POSTOP FOLLOW-UP VISIT: CPT | Mod: ,,,

## 2024-02-28 PROCEDURE — 25000003 PHARM REV CODE 250: Performed by: NURSE PRACTITIONER

## 2024-02-28 PROCEDURE — 25000003 PHARM REV CODE 250

## 2024-02-28 PROCEDURE — 97110 THERAPEUTIC EXERCISES: CPT

## 2024-02-28 PROCEDURE — 25000003 PHARM REV CODE 250: Performed by: PHYSICIAN ASSISTANT

## 2024-02-28 PROCEDURE — 85025 COMPLETE CBC W/AUTO DIFF WBC: CPT

## 2024-02-28 PROCEDURE — 97116 GAIT TRAINING THERAPY: CPT

## 2024-02-28 PROCEDURE — 80053 COMPREHEN METABOLIC PANEL: CPT

## 2024-02-28 PROCEDURE — 94760 N-INVAS EAR/PLS OXIMETRY 1: CPT

## 2024-02-28 RX ORDER — POTASSIUM CHLORIDE 20 MEQ/1
20 TABLET, EXTENDED RELEASE ORAL ONCE
Status: COMPLETED | OUTPATIENT
Start: 2024-02-28 | End: 2024-02-28

## 2024-02-28 RX ADMIN — METOPROLOL TARTRATE 12.5 MG: 25 TABLET, FILM COATED ORAL at 09:02

## 2024-02-28 RX ADMIN — SUCRALFATE 1 G: 1 TABLET ORAL at 12:02

## 2024-02-28 RX ADMIN — SUCRALFATE 1 G: 1 TABLET ORAL at 04:02

## 2024-02-28 RX ADMIN — FOLIC ACID 1 MG: 1 TABLET ORAL at 09:02

## 2024-02-28 RX ADMIN — ASPIRIN 81 MG: 81 TABLET, COATED ORAL at 09:02

## 2024-02-28 RX ADMIN — SODIUM BICARBONATE 650 MG TABLET 1300 MG: at 09:02

## 2024-02-28 RX ADMIN — FAMOTIDINE 20 MG: 10 INJECTION, SOLUTION INTRAVENOUS at 09:02

## 2024-02-28 RX ADMIN — CLOPIDOGREL BISULFATE 75 MG: 75 TABLET ORAL at 09:02

## 2024-02-28 RX ADMIN — DOCUSATE SODIUM 200 MG: 100 CAPSULE, LIQUID FILLED ORAL at 09:02

## 2024-02-28 RX ADMIN — AMIODARONE HYDROCHLORIDE 0.5 MG/MIN: 50 INJECTION, SOLUTION INTRAVENOUS at 01:02

## 2024-02-28 RX ADMIN — SUCRALFATE 1 G: 1 TABLET ORAL at 09:02

## 2024-02-28 RX ADMIN — POTASSIUM CHLORIDE 20 MEQ: 1500 TABLET, EXTENDED RELEASE ORAL at 12:02

## 2024-02-28 RX ADMIN — ENOXAPARIN SODIUM 40 MG: 40 INJECTION SUBCUTANEOUS at 04:02

## 2024-02-28 RX ADMIN — INSULIN ASPART 6 UNITS: 100 INJECTION, SOLUTION INTRAVENOUS; SUBCUTANEOUS at 11:02

## 2024-02-28 NOTE — PROGRESS NOTES
"2/27/2024 @ 0900    Sitting up in chair.  Refuses ambulation due to being "cold inside" and not eating breakfast.  Additional blanket provided.  Reported above to nurse.    "

## 2024-02-28 NOTE — PROGRESS NOTES
"   02/28/24 1220   Pre Exercise Vitals   /68   Pulse 83  (SR)   Supplemental O2? No   SpO2 96 %   During Exercise Vitals   Pulse 111   Supplemental O2? No   SpO2 95 %   Distance Walked 110 feet   Post Exercise Vitals   /72   Pulse 87   Supplemental O2? No   SpO2 94 %   Modality   Modality   (rollator)     Currently sitting up in chair, IV cont, texting on phone.  Pt with poor eye contact, mumbles answers to questions but agrees to ambulate.  Assist x2 (me and pt's nurse Angel), attempts x2 sit to stand, verbal reminders needed for sternal precautions but still does not fully follow.  Upon standing noted that pt has been sitting in urine--states nobody ever came in to help him.  Chux changed.  Ambulated in hallway with assist x2, appears aggravated with monitors/IV/rollator.  Had to ask pt questions twice before he would answer.  (Ex:  "are you ready to return to room").  Upon returning to room turned and ran over my foot with rollator wheel.  Assist to chair, warm pack applied to lower back for pain.  Call bell within reach.  Nurse aware of above.    "

## 2024-02-28 NOTE — PT/OT/SLP PROGRESS
Occupational Therapy      Patient Name:  Rustam Matthews   MRN:  [id number]    Attempting to see patient at this time, however patient refusing to participate in therapy session despite max education and motivation. RN notified. Will follow up as able.     2/28/2024

## 2024-02-28 NOTE — PLAN OF CARE
I spoke to pt regarding rehab recommendation from Pt/Ot . Pt states he is not interested in going to rehab. I have discussed the benefits of rehab with him. He is interested in HH only.

## 2024-02-28 NOTE — PT/OT/SLP PROGRESS
Physical Therapy Treatment    Patient Name:  Rustam Matthews   MRN:  [id number]    Recommendations:     Discharge therapy intensity: Moderate Intensity Therapy   Discharge Equipment Recommendations: to be determined by next level of care  Barriers to discharge: Impaired mobility    Assessment:     Rustam Matthews is a 63 y.o. male admitted with a medical diagnosis of CAD s/p cabg.  He presents with the following impairments/functional limitations: decreased safety awareness, impaired endurance, impaired functional mobility, impaired self care skills, gait instability . Pt showing slow progression with his fxn'l mobility and endurance    Rehab Prognosis: Good; patient would benefit from acute skilled PT services to address these deficits and reach maximum level of function.    Recent Surgery: Procedure(s) (LRB):  CORONARY ARTERY BYPASS GRAFT (CABG) (N/A)  SURGICAL PROCUREMENT, VEIN, ENDOSCOPIC (N/A)  EXCLUSION, LEFT ATRIAL APPENDAGE (N/A)  ENDARTERECTOMY (N/A) 6 Days Post-Op    Plan:     During this hospitalization, patient to be seen 5 x/week to address the identified rehab impairments via gait training, therapeutic activities, therapeutic exercises and progress toward the following goals:    Plan of Care Expires:  03/23/24    Subjective     Chief Complaint:   Patient/Family Comments/goals:   Pain/Comfort:         Objective:     Communicated with nurse prior to session.  Patient found up in chair with   upon PT entry to room.     General Precautions: Standard, sternal, fall  Orthopedic Precautions: N/A  Braces: N/A  Respiratory Status: Room air  Blood Pressure:   Skin Integrity: Visible skin intact      Functional Mobility:  Transfers:     Sit to Stand:  minimum assistance with rolling walker  Bed to Chair: minimum assistance with  rolling walker  using  Step Transfer  Gait: pt ambulated with a rw for 60feet x 2 attempts with sba/cga    Therapeutic Activities/Exercises:      Education:  Patient provided with verbal  education education regarding PT role/goals/POC, post-op precautions, and safety awareness.  Understanding was verbalized.     Patient left up in chair with all lines intact and call button in reach..    GOALS:   Multidisciplinary Problems       Physical Therapy Goals          Problem: Physical Therapy    Goal Priority Disciplines Outcome Goal Variances Interventions   Physical Therapy Goal     PT, PT/OT Ongoing, Progressing     Description: Goals to be met by: 3/23/24     Patient will increase functional independence with mobility by performin. Supine to sit with MInimal Assistance  2. Sit to supine with MInimal Assistance  3. Sit to stand transfer with Minimal Assistance  4. Gait  x 150 feet with Minimal Assistance using Rolling Walker.                          Time Tracking:     PT Received On: 24  PT Start Time: 1356     PT Stop Time: 1415  PT Total Time (min): 19 min     Billable Minutes: Gait Training 19    Treatment Type: Treatment  PT/PTA: PT     Number of PTA visits since last PT visit: 2024

## 2024-02-28 NOTE — PROGRESS NOTES
Inpatient Nutrition Evaluation    Admit Date: 2/22/2024   Total duration of encounter: 6 days   Patient Age: 63 y.o.    Nutrition Recommendation/Prescription     Continue Diet heart healthy as ordered and tolerated.   Add chocolate Boost Glucose Control (provides 190 kcal, 16 g protein per serving) BID for additional nutrition.   Encouraged small and frequent meals.     Nutrition Assessment     Chart Review    Reason Seen: length of stay    Malnutrition Screening Tool Results   Have you recently lost weight without trying?: No  Have you been eating poorly because of a decreased appetite?: No   MST Score: 0   Diagnosis:  Severe coronary artery disease  -s/p CABG and LLAA  DM  HTN  CKD    Relevant Medical History: PAD, HTN, DM, CKD 3, chronic ENOCH, recurrent GI bleed     Scheduled Medications:  aspirin, 81 mg, Daily  clopidogreL, 75 mg, Daily  docusate sodium, 200 mg, BID  enoxparin, 40 mg, Daily  famotidine (PF), 20 mg, Daily  folic acid, 1 mg, Daily  metoprolol tartrate, 12.5 mg, BID  sodium bicarbonate, 1,300 mg, BID  sucralfate, 1 g, QID (AC & HS)    Continuous Infusions:  amiodarone in dextrose 5%, Last Rate: 0.5 mg/min (02/28/24 0125)  insulin regular 1 units/mL infusion orderable (CTS POST-OP), Last Rate: Stopped (02/23/24 1207)  loperamide    PRN Medications: acetaminophen, acetaminophen-codeine 300-30mg, bisacodyL, calcium gluconate IVPB, calcium gluconate IVPB, calcium gluconate IVPB, dextrose 10%, dextrose 10%, dextrose 10%, dextrose 10%, glucagon (human recombinant), glucose, glucose, hydrALAZINE, insulin aspart U-100, lactulose 10 gram/15 ml, loperamide, magnesium sulfate IVPB, magnesium sulfate IVPB, metoclopramide, ondansetron, polyethylene glycol, sodium phosphate 15 mmol in dextrose 5 % (D5W) 250 mL IVPB, sodium phosphate 20.01 mmol in dextrose 5 % (D5W) 250 mL IVPB, sodium phosphate 30 mmol in dextrose 5 % (D5W) 250 mL IVPB    Recent Labs   Lab 02/22/24  0638 02/22/24  0715 02/22/24  1941  02/22/24 2025 02/23/24  0010 02/23/24  0144 02/24/24  0128 02/25/24  0653 02/26/24  0411 02/26/24  1230 02/27/24  0532 02/27/24  1455 02/28/24  0513     --   --   --   --  140 138 140 141 138  --  139 142   K 3.4*  --  3.8   < >  --  3.7 4.4 4.4 4.0 3.9  --  3.3* 3.6   CALCIUM 9.7  --   --   --   --  8.3* 8.1* 8.7* 8.7* 9.1  --  8.4* 8.3*   PHOS 2.7  --   --   --   --  2.2* 2.9  --   --   --   --   --   --    MG 3.00*  --   --   --   --  2.50  --   --   --   --   --  2.50  --    CHLORIDE 112*  --   --   --   --  112* 113* 113* 112* 110*  --  109* 107   CO2 24  --   --   --   --  21* 21* 19* 17* 21*  --  23 23   BUN 19.4  --   --   --   --  19.3 21.0 27.6* 37.6* 38.6*  --  33.8* 31.7*   CREATININE 2.69*  --   --   --   --  2.75* 2.82* 3.00* 3.54* 3.55*  --  3.43* 3.46*   EGFRNORACEVR 26  --   --   --   --  25 24 22 18 18  --  19 19   GLUCOSE 172*  --   --   --   --  154* 187* 134* 121* 230*  --  169* 154*   BILITOT  --   --   --   --   --  0.3 0.5 0.5 <0.1 0.5  --  0.4 0.5   ALKPHOS  --   --   --   --   --  60 70 87 77 92  --  80 96   ALT  --   --   --   --   --  9 8 7 9 12  --  24 25   AST  --   --   --   --   --  34 34 27 29 37*  --  51* 41*   ALBUMIN  --   --   --   --   --  3.1* 2.8* 2.8* 2.5* 2.6*  --  2.3* 2.7*   WBC 10.73  --   --   --  11.37  --  10.89 9.37  9.37 9.46  --  7.03  --  6.91   HGB 9.4*  --  9.5*  --  8.8*  --  8.7* 10.0*  10.0* 9.4*  --  9.1*  --  9.2*   HCT 30.5*   < > 30.9*  --  28.8*  --  27.4* 31.2*  31.2* 31.0*  --  29.6*  --  30.3*    < > = values in this interval not displayed.     Nutrition Orders:  Diet heart healthy  Dietary nutrition supplements Boost Glucose Control Chocolate; BID    Appetite/Oral Intake: poor/25-50% of meals  Factors Affecting Nutritional Intake: decreased appetite  Food/Sikhism/Cultural Preferences: none reported  Food Allergies:  allergenic extract-food-shrimp; lactose; mayonnaise  Last Bowel Movement: 02/21/24  Wound(s):      Comments    2/28/24 Pt  "flat, not very conversive this morning. States did not eat breakfast and appetite has been reduced since admit. Obtained some preferences, wants sherbet, grapes and pineapples added to breakfast trays. Willing to trial chocolate oral supplement to increase po intake. Denies any GI complaints or unintentional wt loss. Unsure wt dry UBW is.     Anthropometrics    Height: 6' 3" (190.5 cm), Height Method: Stated  Last Weight: 126.9 kg (279 lb 11.2 oz) (02/28/24 0602), Weight Method: Standard Scale  BMI (Calculated): 35  BMI Classification: obese grade II (BMI 35-39.9)        Ideal Body Weight (IBW), Male: 196 lb     % Ideal Body Weight, Male (lb): 139.36 %                          Usual Weight Provided By: patient denies unintentional weight loss    Wt Readings from Last 5 Encounters:   02/28/24 126.9 kg (279 lb 11.2 oz)   02/07/24 128 kg (282 lb 3.2 oz)   12/27/23 128.8 kg (284 lb)   11/01/23 122.5 kg (270 lb)   10/22/23 132.5 kg (292 lb)     Weight Change(s) Since Admission:    Wt Readings from Last 1 Encounters:   02/28/24 0602 126.9 kg (279 lb 11.2 oz)   02/27/24 0500 128.2 kg (282 lb 11.2 oz)   02/26/24 0635 129.4 kg (285 lb 3.2 oz)   02/25/24 0622 130.1 kg (286 lb 13.1 oz)   02/24/24 0600 131.3 kg (289 lb 7.4 oz)   02/23/24 0600 131.4 kg (289 lb 11 oz)   02/22/24 1115 123.9 kg (273 lb 2.4 oz)   02/22/24 0546 123.9 kg (273 lb 2.4 oz)   02/15/24 1412 129.3 kg (285 lb)   Admit Weight: 129.3 kg (285 lb) (02/15/24 1412), Weight Method: Stated    Patient Education     Not applicable.    Nutrition Goals & Monitoring     Dietitian will monitor: energy intake, electrolyte/renal panel, and glucose/endocrine profile    Nutrition Risk/Follow-Up: low (follow-up in 5-7 days)  Patients assigned 'low nutrition risk' status do not qualify for a full nutritional assessment but will be monitored and re-evaluated in a 5-7 day time period. Please consult if re-evaluation needed sooner.   "

## 2024-02-28 NOTE — PLAN OF CARE
Planning for [facility]-  FOC list provided. [facility] [place] [place] referral sent via [facility]

## 2024-02-28 NOTE — PROGRESS NOTES
CT SURGERY PROGRESS NOTE  Rustam Matthews  63 y.o.  1960    Patients Procedure: Procedure(s) (LRB):  CORONARY ARTERY BYPASS GRAFT (CABG) (N/A)  SURGICAL PROCUREMENT, VEIN, ENDOSCOPIC (N/A)  EXCLUSION, LEFT ATRIAL APPENDAGE (N/A)  ENDARTERECTOMY (N/A)    Subjective  Interval History: Patient is postoperative day 6 from CABG x 3 and ligation of left atrial appendage. Sitting up in chair this morning.  Complaining of worsening left calf pain and swelling along with incisional chest pain.  Denies any shortness of breath.  18 beat run of vtach yesterday.  Amiodarone bolus and drip initiated.  Drip infusing this morning.  Currently NSR on tele.      Review of Systems   Constitutional:  Positive for malaise/fatigue. Negative for chills and fever.   Respiratory:  Negative for cough, shortness of breath and wheezing.    Cardiovascular:  Positive for chest pain (incisional). Negative for palpitations and leg swelling.   Gastrointestinal:  Negative for nausea and vomiting.    Medication List  Infusions   amiodarone in dextrose 5% 0.5 mg/min (02/28/24 0125)    insulin regular 1 units/mL infusion orderable (CTS POST-OP) Stopped (02/23/24 1207)    loperamide       Scheduled   aspirin  81 mg Oral Daily    clopidogreL  75 mg Oral Daily    docusate sodium  200 mg Oral BID    enoxparin  40 mg Subcutaneous Daily    famotidine (PF)  20 mg Intravenous Daily    folic acid  1 mg Oral Daily    metoprolol tartrate  12.5 mg Oral BID    potassium chloride  20 mEq Oral Once    sodium bicarbonate  1,300 mg Oral BID    sucralfate  1 g Oral QID (AC & HS)       Objective:  Recent Vitals:  Temp:  [97.8 °F (36.6 °C)-98.9 °F (37.2 °C)] 98.7 °F (37.1 °C)  Pulse:  [64-78] 64  Resp:  [18-20] 19  SpO2:  [94 %-99 %] 96 %  BP: (107-138)/(55-76) 138/76    Physical Exam  Constitutional:       General: He is not in acute distress.     Appearance: He is obese. He is not ill-appearing.   HENT:      Head: Normocephalic and atraumatic.   Cardiovascular:       Rate and Rhythm: Normal rate and regular rhythm.      Pulses: Normal pulses.      Heart sounds: Normal heart sounds. No murmur heard.     No friction rub. No gallop.   Pulmonary:      Effort: Pulmonary effort is normal. No respiratory distress.      Breath sounds: Rhonchi present. No wheezing or rales.   Abdominal:      General: Bowel sounds are normal. There is no distension.      Palpations: Abdomen is soft.   Musculoskeletal:      Right lower leg: Edema present.      Left lower leg: Edema present, greater than right.   Skin:     General: Skin is warm and dry.      Comments: Median sternotomy dressing removed.  Wound clean, dry, and intact.    Neurological:      General: No focal deficit present.      Mental Status: He is alert and oriented to person, place, and time.   Psychiatric:         Mood and Affect: Mood normal.         Behavior: Behavior normal.     I/O last 24 hrs:  Intake/Output - Last 3 Shifts         02/26 0700  02/27 0659 02/27 0700 02/28 0659 02/28 0700 02/29 0659    P.O. 960 1370     Total Intake(mL/kg) 960 (7.5) 1370 (10.8)     Urine (mL/kg/hr) 1400 (0.5) 2050 (0.7)     Stool 0 0     Total Output 1400 2050     Net -440 -680            Urine Occurrence 5 x      Stool Occurrence 0 x 0 x             Labs  CBC:   Recent Labs   Lab 02/28/24  0513   WBC 6.91   RBC 3.27*   HGB 9.2*   HCT 30.3*      MCV 92.7   MCH 28.1   MCHC 30.4*     CMP:   Recent Labs   Lab 02/28/24  0513   CALCIUM 8.3*   ALBUMIN 2.7*      K 3.6   CO2 23   BUN 31.7*   CREATININE 3.46*   ALKPHOS 96   ALT 25   AST 41*   BILITOT 0.5     LFTs:   Recent Labs   Lab 02/28/24  0513   ALT 25   AST 41*   ALKPHOS 96   BILITOT 0.5   ALBUMIN 2.7*     All pertinent labs from the last 24 hours have been reviewed.    ASSESSMENT/PLAN:    Severe coronary artery disease  -s/p CABG and LLAA  DM  HTN  CKD  -nephrology signed off    -Venous NIVA ordered of bilateral lower extremities   -Patient advised and encouraged to ambulate.  Frequent IS  use also encouraged  -K noted to be 3.6 - replace  -Continue amiodarone drip - may consider transitioning to PO tomorrow if no more rhythm issues.   -Continue to work with PT / OT   -Discharge planning per case management     Case and plan of care discussed with Dr. Mitul Pardo, MICHAELP

## 2024-02-29 LAB
ALBUMIN SERPL-MCNC: 2.6 G/DL (ref 3.4–4.8)
ALBUMIN/GLOB SERPL: 0.7 RATIO (ref 1.1–2)
ALP SERPL-CCNC: 88 UNIT/L (ref 40–150)
ALT SERPL-CCNC: 24 UNIT/L (ref 0–55)
AST SERPL-CCNC: 31 UNIT/L (ref 5–34)
BASOPHILS # BLD AUTO: 0.03 X10(3)/MCL
BASOPHILS NFR BLD AUTO: 0.3 %
BILIRUB SERPL-MCNC: 0.5 MG/DL
BUN SERPL-MCNC: 29.9 MG/DL (ref 8.4–25.7)
CALCIUM SERPL-MCNC: 8.1 MG/DL (ref 8.8–10)
CHLORIDE SERPL-SCNC: 109 MMOL/L (ref 98–107)
CO2 SERPL-SCNC: 22 MMOL/L (ref 23–31)
CREAT SERPL-MCNC: 3.48 MG/DL (ref 0.73–1.18)
EOSINOPHIL # BLD AUTO: 0.19 X10(3)/MCL (ref 0–0.9)
EOSINOPHIL NFR BLD AUTO: 2.1 %
ERYTHROCYTE [DISTWIDTH] IN BLOOD BY AUTOMATED COUNT: 15 % (ref 11.5–17)
GFR SERPLBLD CREATININE-BSD FMLA CKD-EPI: 19 MLS/MIN/1.73/M2
GLOBULIN SER-MCNC: 3.6 GM/DL (ref 2.4–3.5)
GLUCOSE SERPL-MCNC: 219 MG/DL (ref 82–115)
HCT VFR BLD AUTO: 28.5 % (ref 42–52)
HGB BLD-MCNC: 8.8 G/DL (ref 14–18)
IMM GRANULOCYTES # BLD AUTO: 0.03 X10(3)/MCL (ref 0–0.04)
IMM GRANULOCYTES NFR BLD AUTO: 0.3 %
LYMPHOCYTES # BLD AUTO: 1.04 X10(3)/MCL (ref 0.6–4.6)
LYMPHOCYTES NFR BLD AUTO: 11.8 %
MAGNESIUM SERPL-MCNC: 2.5 MG/DL (ref 1.6–2.6)
MCH RBC QN AUTO: 28.7 PG (ref 27–31)
MCHC RBC AUTO-ENTMCNC: 30.9 G/DL (ref 33–36)
MCV RBC AUTO: 92.8 FL (ref 80–94)
MONOCYTES # BLD AUTO: 0.75 X10(3)/MCL (ref 0.1–1.3)
MONOCYTES NFR BLD AUTO: 8.5 %
NEUTROPHILS # BLD AUTO: 6.81 X10(3)/MCL (ref 2.1–9.2)
NEUTROPHILS NFR BLD AUTO: 77 %
NRBC BLD AUTO-RTO: 0 %
PHOSPHATE SERPL-MCNC: 2.1 MG/DL (ref 2.3–4.7)
PLATELET # BLD AUTO: 247 X10(3)/MCL (ref 130–400)
PMV BLD AUTO: 10 FL (ref 7.4–10.4)
POCT GLUCOSE: 203 MG/DL (ref 70–110)
POCT GLUCOSE: 227 MG/DL (ref 70–110)
POTASSIUM SERPL-SCNC: 3.9 MMOL/L (ref 3.5–5.1)
PROT SERPL-MCNC: 6.2 GM/DL (ref 5.8–7.6)
RBC # BLD AUTO: 3.07 X10(6)/MCL (ref 4.7–6.1)
SODIUM SERPL-SCNC: 141 MMOL/L (ref 136–145)
WBC # SPEC AUTO: 8.85 X10(3)/MCL (ref 4.5–11.5)

## 2024-02-29 PROCEDURE — 25000003 PHARM REV CODE 250

## 2024-02-29 PROCEDURE — 99024 POSTOP FOLLOW-UP VISIT: CPT | Mod: ,,,

## 2024-02-29 PROCEDURE — 84100 ASSAY OF PHOSPHORUS: CPT

## 2024-02-29 PROCEDURE — 97535 SELF CARE MNGMENT TRAINING: CPT | Mod: CO

## 2024-02-29 PROCEDURE — 63600175 PHARM REV CODE 636 W HCPCS

## 2024-02-29 PROCEDURE — 21400001 HC TELEMETRY ROOM

## 2024-02-29 PROCEDURE — 85025 COMPLETE CBC W/AUTO DIFF WBC: CPT

## 2024-02-29 PROCEDURE — 80053 COMPREHEN METABOLIC PANEL: CPT

## 2024-02-29 PROCEDURE — 93005 ELECTROCARDIOGRAM TRACING: CPT

## 2024-02-29 PROCEDURE — 93010 ELECTROCARDIOGRAM REPORT: CPT | Mod: 76,,, | Performed by: INTERNAL MEDICINE

## 2024-02-29 PROCEDURE — 83735 ASSAY OF MAGNESIUM: CPT

## 2024-02-29 PROCEDURE — 25000003 PHARM REV CODE 250: Performed by: PHYSICIAN ASSISTANT

## 2024-02-29 PROCEDURE — 25000003 PHARM REV CODE 250: Performed by: NURSE PRACTITIONER

## 2024-02-29 RX ORDER — ATORVASTATIN CALCIUM 40 MG/1
80 TABLET, FILM COATED ORAL DAILY
Status: DISCONTINUED | OUTPATIENT
Start: 2024-02-29 | End: 2024-03-01 | Stop reason: HOSPADM

## 2024-02-29 RX ORDER — FUROSEMIDE 40 MG/1
40 TABLET ORAL DAILY
Status: DISCONTINUED | OUTPATIENT
Start: 2024-03-01 | End: 2024-03-01 | Stop reason: HOSPADM

## 2024-02-29 RX ORDER — RANOLAZINE 500 MG/1
500 TABLET, EXTENDED RELEASE ORAL 2 TIMES DAILY
Status: DISCONTINUED | OUTPATIENT
Start: 2024-02-29 | End: 2024-03-01 | Stop reason: HOSPADM

## 2024-02-29 RX ORDER — METOPROLOL TARTRATE 25 MG/1
25 TABLET, FILM COATED ORAL 2 TIMES DAILY
Status: DISCONTINUED | OUTPATIENT
Start: 2024-02-29 | End: 2024-03-01

## 2024-02-29 RX ADMIN — SUCRALFATE 1 G: 1 TABLET ORAL at 10:02

## 2024-02-29 RX ADMIN — CLOPIDOGREL BISULFATE 75 MG: 75 TABLET ORAL at 10:02

## 2024-02-29 RX ADMIN — SUCRALFATE 1 G: 1 TABLET ORAL at 09:02

## 2024-02-29 RX ADMIN — METOPROLOL TARTRATE 25 MG: 25 TABLET, FILM COATED ORAL at 09:02

## 2024-02-29 RX ADMIN — INSULIN ASPART 4 UNITS: 100 INJECTION, SOLUTION INTRAVENOUS; SUBCUTANEOUS at 04:02

## 2024-02-29 RX ADMIN — RANOLAZINE 500 MG: 500 TABLET, EXTENDED RELEASE ORAL at 09:02

## 2024-02-29 RX ADMIN — FAMOTIDINE 20 MG: 10 INJECTION, SOLUTION INTRAVENOUS at 10:02

## 2024-02-29 RX ADMIN — METOPROLOL TARTRATE 12.5 MG: 25 TABLET, FILM COATED ORAL at 10:02

## 2024-02-29 RX ADMIN — DOCUSATE SODIUM 200 MG: 100 CAPSULE, LIQUID FILLED ORAL at 10:02

## 2024-02-29 RX ADMIN — ATORVASTATIN CALCIUM 80 MG: 40 TABLET, FILM COATED ORAL at 04:02

## 2024-02-29 RX ADMIN — SODIUM BICARBONATE 650 MG TABLET 1300 MG: at 09:02

## 2024-02-29 RX ADMIN — ASPIRIN 81 MG: 81 TABLET, COATED ORAL at 10:02

## 2024-02-29 RX ADMIN — ENOXAPARIN SODIUM 40 MG: 40 INJECTION SUBCUTANEOUS at 04:02

## 2024-02-29 RX ADMIN — FOLIC ACID 1 MG: 1 TABLET ORAL at 10:02

## 2024-02-29 RX ADMIN — SUCRALFATE 1 G: 1 TABLET ORAL at 04:02

## 2024-02-29 RX ADMIN — INSULIN ASPART 4 UNITS: 100 INJECTION, SOLUTION INTRAVENOUS; SUBCUTANEOUS at 06:02

## 2024-02-29 RX ADMIN — DOCUSATE SODIUM 200 MG: 100 CAPSULE, LIQUID FILLED ORAL at 09:02

## 2024-02-29 RX ADMIN — SODIUM BICARBONATE 650 MG TABLET 1300 MG: at 10:02

## 2024-02-29 NOTE — PT/OT/SLP PROGRESS
Pt refused to ambulate with PT and with cardiopulmonary nurse this afternoon. Will try again tomorrow

## 2024-02-29 NOTE — PLAN OF CARE
Received call that pt has already [facility]. [facility]. I spoke to Lucie Hoover  was unable to accept pt.

## 2024-02-29 NOTE — PT/OT/SLP PROGRESS
"Occupational Therapy   Treatment    Name: Rustam Matthews  MRN: [id number]  Admitting Diagnosis:  <principal problem not specified>  7 Days Post-Op    Recommendations:     Recommended therapy intensity at discharge: Moderate Intensity Therapy   Discharge Equipment Recommendations:  to be determined by next level of care  Barriers to discharge:       Assessment:     Rustam Matthews is a 63 y.o. male with a medical diagnosis of  severe CAD s/p CABG, hx of PAD, htn, DM, stage 4 CKD.  He presents with exasperation with therapy services stating "I am a grown man, I can do all of this". Patient educated on importance of therapy services and how it is pertinent to the patient's healing process, especially since he is wanting to discharge home from the hospital. Patent consenting to participate in therapy session. He required verbal cues throughout session to maintain sternal precautions. Patient presents with flat/begruding affect for duration of session. Performance deficits affecting function are weakness, impaired endurance, impaired self care skills, impaired functional mobility, gait instability, impaired balance, decreased upper extremity function, decreased lower extremity function, pain, impaired cardiopulmonary response to activity.     Rehab Prognosis:  Fair; patient would benefit from acute skilled OT services to address these deficits and reach maximum level of function.       Plan:     Patient to be seen 4 x/week to address the above listed problems via self-care/home management  Plan of Care Expires: 03/23/24  Plan of Care Reviewed with: patient    Subjective     Pain/Comfort:       Objective:     Communicated with: RN prior to session.  Patient found up in chair with   upon OT entry to room.    General Precautions: Standard, sternal, fall    Orthopedic Precautions:N/A  Braces: N/A     Occupational Performance:     Functional Mobility/Transfers:  Patient completed Sit <> Stand Transfer with stand by assistance  " with  rolling walker   Functional Mobility: patient standing from chair requiring VC to maintain sternal pxns, patient mobilizing from EOB to in room sink no LOB    Activities of Daily Living:  Grooming: stand by assistance standing at sink washing hands  Lower Body Dressing: not tested as patient declined to perform    Patient Education:  Patient provided with verbal education education regarding OT role/goals/POC, post op precautions, fall prevention, and safety awareness.  Understanding was verbalized, however additional teaching warranted.      Patient left up in chair with all lines intact and call button in reach    GOALS:   Multidisciplinary Problems       Occupational Therapy Goals          Problem: Occupational Therapy    Goal Priority Disciplines Outcome Interventions   Occupational Therapy Goal     OT, PT/OT Ongoing, Progressing    Description: Goals to be met by: 2/23/24     Patient will increase functional independence with ADLs by performing:    UE Dressing with Minimal Assistance.  LE Dressing with Minimal Assistance.  Toileting from toilet with Minimal Assistance for hygiene and clothing management.   Toilet transfer to toilet with Minimal Assistance.                         Time Tracking:     OT Date of Treatment: 02/29/24  OT Start Time: 0858  OT Stop Time: 0906  OT Total Time (min): 8 min    Billable Minutes:Self Care/Home Management 1    OT/FIORELLA: FIORELLA     Number of FIORELLA visits since last OT visit: 2    2/29/2024

## 2024-02-29 NOTE — PROGRESS NOTES
CT SURGERY PROGRESS NOTE  Rustam Matthews  63 y.o.  1960    Patients Procedure: Procedure(s) (LRB):  CORONARY ARTERY BYPASS GRAFT (CABG) (N/A)  SURGICAL PROCUREMENT, VEIN, ENDOSCOPIC (N/A)  EXCLUSION, LEFT ATRIAL APPENDAGE (N/A)  ENDARTERECTOMY (N/A)    Subjective  Interval History: Patient is postoperative day 7 from CABG x 3 and ligation of left atrial appendage. Sitting up in chair this morning, continues to complain of incisional chest pain.  Denies shortness of breath or palpitations.  Notified by nursing staff yesterday afternoon that patient was having episodes of bradycardia.  Amiodarone discontinued.  Appears to have had a 5 minute run of SVT around 0200 this morning.  Currently NSR on tele.       Review of Systems   Constitutional:  Negative for chills and fever.   Respiratory:  Negative for cough, shortness of breath and wheezing.    Cardiovascular:  Positive for chest pain (incisional). Negative for palpitations and leg swelling.   Gastrointestinal:  Negative for nausea and vomiting.    Medication List  Infusions   loperamide       Scheduled   aspirin  81 mg Oral Daily    clopidogreL  75 mg Oral Daily    docusate sodium  200 mg Oral BID    enoxparin  40 mg Subcutaneous Daily    famotidine (PF)  20 mg Intravenous Daily    folic acid  1 mg Oral Daily    metoprolol tartrate  12.5 mg Oral BID    sodium bicarbonate  1,300 mg Oral BID    sucralfate  1 g Oral QID (AC & HS)       Objective:  Recent Vitals:  Temp:  [97.5 °F (36.4 °C)-98.5 °F (36.9 °C)] 97.5 °F (36.4 °C)  Pulse:  [45-94] 79  Resp:  [18-20] 18  SpO2:  [94 %-99 %] 96 %  BP: (105-135)/(46-73) 129/69    Physical Exam  Constitutional:       General: He is not in acute distress.     Appearance: He is obese. He is not ill-appearing.   HENT:      Head: Normocephalic and atraumatic.   Cardiovascular:      Rate and Rhythm: Normal rate and regular rhythm.      Pulses: Normal pulses.      Heart sounds: Normal heart sounds. No murmur heard.     No friction  rub. No gallop.   Pulmonary:      Effort: Pulmonary effort is normal. No respiratory distress.      Breath sounds: No wheezing, rales, or rhonchi.    Abdominal:      General: Bowel sounds are normal. There is no distension.      Palpations: Abdomen is soft.   Musculoskeletal:      Right lower leg: Edema present.      Left lower leg: Edema present, greater than right.   Skin:     General: Skin is warm and dry.      Comments: Median sternotomy dressing removed.  Wound clean, dry, and intact.    Neurological:      General: No focal deficit present.      Mental Status: He is alert and oriented to person, place, and time.   Psychiatric:         Mood and Affect: Mood normal.         Behavior: Behavior normal.     I/O last 24 hrs:  Intake/Output - Last 3 Shifts         02/27 0700  02/28 0659 02/28 0700 02/29 0659 02/29 0700 03/01 0659    P.O. 1370 360     Total Intake(mL/kg) 1370 (10.8) 360 (2.8)     Urine (mL/kg/hr) 2050 (0.7) 500 (0.2) 275 (0.3)    Stool 0 0     Total Output 2050 500 275    Net -680 -140 -275           Stool Occurrence 0 x 0 x             Labs  CBC:   Recent Labs   Lab 02/29/24  0523   WBC 8.85   RBC 3.07*   HGB 8.8*   HCT 28.5*      MCV 92.8   MCH 28.7   MCHC 30.9*     CMP:   Recent Labs   Lab 02/29/24  0523   CALCIUM 8.1*   ALBUMIN 2.6*      K 3.9   CO2 22*   BUN 29.9*   CREATININE 3.48*   ALKPHOS 88   ALT 24   AST 31   BILITOT 0.5     LFTs:   Recent Labs   Lab 02/29/24  0523   ALT 24   AST 31   ALKPHOS 88   BILITOT 0.5   ALBUMIN 2.6*     All pertinent labs from the last 24 hours have been reviewed.      ASSESSMENT/PLAN:    Severe coronary artery disease  -s/p CABG and LLAA  DM  HTN  CKD  -nephrology signed off    -Venous NIVA negative  -H&H stable: 8.8 / 28.5  -Cr stable: 3.48 (baseline).  UOP marginal but according to patient is normal for him.   -Continue to work with PT / OT  -Apply DWAYNE hose to bilateral lower extremities   -Frequent IS use  -Cardiology consulted for assistance with  arrhythmias.  Appreciate their recommendations    Case and plan of care discussed with Dr. Mitul Pardo, MICHAELP

## 2024-02-29 NOTE — CONSULTS
Inpatient consult to Cardiology  Consult performed by: Alexus York FNP  Consult ordered by: Senait Pardo FNP  Reason for consult: Arrhythmia        [facility]    Cardiology  Consult Note    Patient Name: Rustam Matthews  MRN: [id number]  Admission Date: 2/22/2024  Hospital Length of Stay: 7 days  Code Status: Full Code   Attending Provider: Silvana Bauman MD   Consulting Provider: JUSTO Irwin  Primary Care Physician: Vasquez Rodriguez MD  Principal Problem:<principal problem not specified>    Patient information was obtained from patient, past medical records, ER records, and primary team.     Subjective:     Chief Complaint/Reason for Consult: Arrhythmia     HPI: Ms. Matthews is a 62 y/o male with a history of  CAD s/p CABG, Bilateral YURY, Claudication with PAD, HTN, Angiectasia, DM II, CKD IV, who is known to [facility], Dr. Bee. He presented to [facility] on 2.22.24 for a scheduled CABG. He underwent a CABGx3 LIMA to LAD, SV to OM1, SV to PDA, Endarterectomy OM1, LAAL. Patient develop an arrhythmia post-op. Significant labs include H&H 2.8/28.5, Chloride 109, CO2 22, BUN/Creat 29.9/3.48, Glucose 219, Calcium 8.1, Albumin 2.6. First EKG shows sinus rhythm and second EKG showed AFIB. [facility] has been consulted for Arrhythmia management.     PMH: CAD, Bilateral YURY, Claudication with PAD, HTN, Angiectasia, DM II, CKD IV, Anemia, Arthritis, Obesity  PSH: CABG, Colonoscopy, Endarterectomy, LAAL, LHC, Peripheral Angiography, EGD  Family History: Mother - CAD,CABG  Social History: Current Smoker. Denies illicit drug use and alcohol use.     Previous Cardiac Diagnostics:   CABG x 3(2.22.24):  Left internal mammary artery to the LAD   Saphenous venous graft to   OM1  OM1 endarterectomy  Saphenous venous graft to    PDA  Ligation of left atrial appendage with a 40 mm atrial clip  Endoscopic venous harvesting left greater saphenous vein    ECHO (1.4.24):  Overall the study quality was  technically difficult. The study was difficult due to patient's body habitus and poor endocardial visualization.  Definity utilized. Left Ventricle: There is normal systolic function with a visually estimated ejection fraction of 60 - 65%. Grade I diastolic dysfunction. Right Ventricle: Right ventricle was not well visualized due to poor acoustic window. Normal right ventricular cavity size. Systolic function is normal. Aortic valve is not well visualized. There appears to be mild valvular sclerosis.No significant valvular stenosis or regurgitation noted.    Review of patient's allergies indicates:   Allergen Reactions    Allergenic extract-food-shrimp Rash     All seafood      Lactose Rash    Mayonnaise Rash     Current Facility-Administered Medications on File Prior to Encounter   Medication    aspirin tablet 325 mg    diazePAM tablet 10 mg     Current Outpatient Medications on File Prior to Encounter   Medication Sig    amLODIPine (NORVASC) 10 MG tablet Take 10 mg by mouth.    aspirin (ECOTRIN) 81 MG EC tablet Take 81 mg by mouth once daily.    calcitRIOL (ROCALTROL) 0.5 MCG Cap Take 0.5 mcg by mouth 4 (four) times a week.    furosemide (LASIX) 20 MG tablet Take 20 mg by mouth once daily. Take 2 tablets by mouth once a day as needed for swelling    glimepiride (AMARYL) 4 MG tablet Take 4 mg by mouth once daily.    KLOR-CON 10 10 mEq TbSR Take 10 mEq by mouth once daily.    metoprolol succinate (TOPROL-XL) 100 MG 24 hr tablet Take 100 mg by mouth once daily.    nitroGLYCERIN (NITROSTAT) 0.4 MG SL tablet Place under the tongue.    omeprazole (PRILOSEC) 40 MG capsule Take 40 mg by mouth once daily.    ranolazine (RANEXA) 500 MG Tb12 Take 500 mg by mouth 2 (two) times daily.    sodium bicarbonate 650 MG tablet Take 650 mg by mouth 2 (two) times daily.    acyclovir (ZOVIRAX) 400 MG tablet Take 400 mg by mouth as needed.    ALPRAZolam (XANAX) 1 MG tablet Take 1 mg by mouth 2 (two) times daily.    amitriptyline (ELAVIL)  50 MG tablet Take 50 mg by mouth every evening.    atenoloL (TENORMIN) 50 MG tablet Take 50 mg by mouth once daily.    cilostazoL (PLETAL) 50 MG Tab Take 50 mg by mouth 2 (two) times daily.    clopidogreL (PLAVIX) 75 mg tablet Take 75 mg by mouth once daily.    olmesartan-hydrochlorothiazide (BENICAR HCT) 40-25 mg per tablet Take 1 tablet by mouth once daily.    rosuvastatin (CRESTOR) 40 MG Tab Take 40 mg by mouth every evening.    sildenafiL (VIAGRA) 100 MG tablet Take 100 mg by mouth daily as needed for Erectile Dysfunction.       Review of Systems   Respiratory:  Negative for chest tightness and shortness of breath.    Cardiovascular:  Negative for chest pain, palpitations and leg swelling.        Incisional Chest Pain    All other systems reviewed and are negative.      Objective:     Vital Signs (Most Recent):  Temp: 97.5 °F (36.4 °C) (02/29/24 1109)  Pulse: 79 (02/29/24 1109)  Resp: 18 (02/29/24 1109)  BP: 129/69 (02/29/24 1109)  SpO2: 96 % (02/29/24 1109) Vital Signs (24h Range):  Temp:  [97.5 °F (36.4 °C)-98.5 °F (36.9 °C)] 97.5 °F (36.4 °C)  Pulse:  [45-94] 79  Resp:  [18-20] 18  SpO2:  [94 %-99 %] 96 %  BP: (105-135)/(46-73) 129/69   Weight: 128.3 kg (282 lb 14.4 oz)  Body mass index is 35.36 kg/m².  SpO2: 96 %       Intake/Output Summary (Last 24 hours) at 2/29/2024 1134  Last data filed at 2/29/2024 0803  Gross per 24 hour   Intake 360 ml   Output 775 ml   Net -415 ml     Lines/Drains/Airways       Peripheral Intravenous Line  Duration                  Peripheral IV - Single Lumen 02/24/24 0625 20 G Anterior;Left Forearm 5 days                  Significant Labs:  Recent Results (from the past 72 hour(s))   Comprehensive Metabolic Panel    Collection Time: 02/26/24 12:30 PM   Result Value Ref Range    Sodium Level 138 136 - 145 mmol/L    Potassium Level 3.9 3.5 - 5.1 mmol/L    Chloride 110 (H) 98 - 107 mmol/L    Carbon Dioxide 21 (L) 23 - 31 mmol/L    Glucose Level 230 (H) 82 - 115 mg/dL    Blood Urea  Nitrogen 38.6 (H) 8.4 - 25.7 mg/dL    Creatinine 3.55 (H) 0.73 - 1.18 mg/dL    Calcium Level Total 9.1 8.8 - 10.0 mg/dL    Protein Total 7.1 5.8 - 7.6 gm/dL    Albumin Level 2.6 (L) 3.4 - 4.8 g/dL    Globulin 4.5 (H) 2.4 - 3.5 gm/dL    Albumin/Globulin Ratio 0.6 (L) 1.1 - 2.0 ratio    Bilirubin Total 0.5 <=1.5 mg/dL    Alkaline Phosphatase 92 40 - 150 unit/L    Alanine Aminotransferase 12 0 - 55 unit/L    Aspartate Aminotransferase 37 (H) 5 - 34 unit/L    eGFR 18 mls/min/1.73/m2   POCT glucose    Collection Time: 02/26/24  5:35 PM   Result Value Ref Range    POCT Glucose 182 (H) 70 - 110 mg/dL   POCT glucose    Collection Time: 02/26/24 10:15 PM   Result Value Ref Range    POCT Glucose 176 (H) 70 - 110 mg/dL   POCT glucose    Collection Time: 02/27/24  5:29 AM   Result Value Ref Range    POCT Glucose 175 (H) 70 - 110 mg/dL   CBC with Differential    Collection Time: 02/27/24  5:32 AM   Result Value Ref Range    WBC 7.03 4.50 - 11.50 x10(3)/mcL    RBC 3.24 (L) 4.70 - 6.10 x10(6)/mcL    Hgb 9.1 (L) 14.0 - 18.0 g/dL    Hct 29.6 (L) 42.0 - 52.0 %    MCV 91.4 80.0 - 94.0 fL    MCH 28.1 27.0 - 31.0 pg    MCHC 30.7 (L) 33.0 - 36.0 g/dL    RDW 15.1 11.5 - 17.0 %    Platelet 205 130 - 400 x10(3)/mcL    MPV 10.3 7.4 - 10.4 fL    Neut % 68.6 %    Lymph % 15.5 %    Mono % 12.2 %    Eos % 3.0 %    Basophil % 0.3 %    Lymph # 1.09 0.6 - 4.6 x10(3)/mcL    Neut # 4.82 2.1 - 9.2 x10(3)/mcL    Mono # 0.86 0.1 - 1.3 x10(3)/mcL    Eos # 0.21 0 - 0.9 x10(3)/mcL    Baso # 0.02 <=0.2 x10(3)/mcL    IG# 0.03 0 - 0.04 x10(3)/mcL    IG% 0.4 %    NRBC% 0.0 %   POCT glucose    Collection Time: 02/27/24 10:34 AM   Result Value Ref Range    POCT Glucose 185 (H) 70 - 110 mg/dL   EKG 12-lead    Collection Time: 02/27/24  1:37 PM   Result Value Ref Range    QRS Duration 98 ms    OHS QTC Calculation 495 ms   Comprehensive Metabolic Panel    Collection Time: 02/27/24  2:55 PM   Result Value Ref Range    Sodium Level 139 136 - 145 mmol/L    Potassium  Level 3.3 (L) 3.5 - 5.1 mmol/L    Chloride 109 (H) 98 - 107 mmol/L    Carbon Dioxide 23 23 - 31 mmol/L    Glucose Level 169 (H) 82 - 115 mg/dL    Blood Urea Nitrogen 33.8 (H) 8.4 - 25.7 mg/dL    Creatinine 3.43 (H) 0.73 - 1.18 mg/dL    Calcium Level Total 8.4 (L) 8.8 - 10.0 mg/dL    Protein Total 6.3 5.8 - 7.6 gm/dL    Albumin Level 2.3 (L) 3.4 - 4.8 g/dL    Globulin 4.0 (H) 2.4 - 3.5 gm/dL    Albumin/Globulin Ratio 0.6 (L) 1.1 - 2.0 ratio    Bilirubin Total 0.4 <=1.5 mg/dL    Alkaline Phosphatase 80 40 - 150 unit/L    Alanine Aminotransferase 24 0 - 55 unit/L    Aspartate Aminotransferase 51 (H) 5 - 34 unit/L    eGFR 19 mls/min/1.73/m2   Magnesium    Collection Time: 02/27/24  2:55 PM   Result Value Ref Range    Magnesium Level 2.50 1.60 - 2.60 mg/dL   POCT glucose    Collection Time: 02/27/24  5:10 PM   Result Value Ref Range    POCT Glucose 136 (H) 70 - 110 mg/dL   POCT glucose    Collection Time: 02/27/24  9:53 PM   Result Value Ref Range    POCT Glucose 222 (H) 70 - 110 mg/dL   Comprehensive Metabolic Panel    Collection Time: 02/28/24  5:13 AM   Result Value Ref Range    Sodium Level 142 136 - 145 mmol/L    Potassium Level 3.6 3.5 - 5.1 mmol/L    Chloride 107 98 - 107 mmol/L    Carbon Dioxide 23 23 - 31 mmol/L    Glucose Level 154 (H) 82 - 115 mg/dL    Blood Urea Nitrogen 31.7 (H) 8.4 - 25.7 mg/dL    Creatinine 3.46 (H) 0.73 - 1.18 mg/dL    Calcium Level Total 8.3 (L) 8.8 - 10.0 mg/dL    Protein Total 6.5 5.8 - 7.6 gm/dL    Albumin Level 2.7 (L) 3.4 - 4.8 g/dL    Globulin 3.8 (H) 2.4 - 3.5 gm/dL    Albumin/Globulin Ratio 0.7 (L) 1.1 - 2.0 ratio    Bilirubin Total 0.5 <=1.5 mg/dL    Alkaline Phosphatase 96 40 - 150 unit/L    Alanine Aminotransferase 25 0 - 55 unit/L    Aspartate Aminotransferase 41 (H) 5 - 34 unit/L    eGFR 19 mls/min/1.73/m2   CBC with Differential    Collection Time: 02/28/24  5:13 AM   Result Value Ref Range    WBC 6.91 4.50 - 11.50 x10(3)/mcL    RBC 3.27 (L) 4.70 - 6.10 x10(6)/mcL    Hgb  9.2 (L) 14.0 - 18.0 g/dL    Hct 30.3 (L) 42.0 - 52.0 %    MCV 92.7 80.0 - 94.0 fL    MCH 28.1 27.0 - 31.0 pg    MCHC 30.4 (L) 33.0 - 36.0 g/dL    RDW 15.1 11.5 - 17.0 %    Platelet 261 130 - 400 x10(3)/mcL    MPV 10.9 (H) 7.4 - 10.4 fL    Neut % 70.4 %    Lymph % 15.6 %    Mono % 10.1 %    Eos % 3.3 %    Basophil % 0.3 %    Lymph # 1.08 0.6 - 4.6 x10(3)/mcL    Neut # 4.86 2.1 - 9.2 x10(3)/mcL    Mono # 0.70 0.1 - 1.3 x10(3)/mcL    Eos # 0.23 0 - 0.9 x10(3)/mcL    Baso # 0.02 <=0.2 x10(3)/mcL    IG# 0.02 0 - 0.04 x10(3)/mcL    IG% 0.3 %    NRBC% 0.0 %   POCT glucose    Collection Time: 02/28/24  5:45 AM   Result Value Ref Range    POCT Glucose 150 (H) 70 - 110 mg/dL   POCT glucose    Collection Time: 02/28/24 10:20 AM   Result Value Ref Range    POCT Glucose 269 (H) 70 - 110 mg/dL   POCT glucose    Collection Time: 02/28/24  4:22 PM   Result Value Ref Range    POCT Glucose 187 (H) 70 - 110 mg/dL   POCT glucose    Collection Time: 02/28/24  9:20 PM   Result Value Ref Range    POCT Glucose 189 (H) 70 - 110 mg/dL   Comprehensive Metabolic Panel    Collection Time: 02/29/24  5:23 AM   Result Value Ref Range    Sodium Level 141 136 - 145 mmol/L    Potassium Level 3.9 3.5 - 5.1 mmol/L    Chloride 109 (H) 98 - 107 mmol/L    Carbon Dioxide 22 (L) 23 - 31 mmol/L    Glucose Level 219 (H) 82 - 115 mg/dL    Blood Urea Nitrogen 29.9 (H) 8.4 - 25.7 mg/dL    Creatinine 3.48 (H) 0.73 - 1.18 mg/dL    Calcium Level Total 8.1 (L) 8.8 - 10.0 mg/dL    Protein Total 6.2 5.8 - 7.6 gm/dL    Albumin Level 2.6 (L) 3.4 - 4.8 g/dL    Globulin 3.6 (H) 2.4 - 3.5 gm/dL    Albumin/Globulin Ratio 0.7 (L) 1.1 - 2.0 ratio    Bilirubin Total 0.5 <=1.5 mg/dL    Alkaline Phosphatase 88 40 - 150 unit/L    Alanine Aminotransferase 24 0 - 55 unit/L    Aspartate Aminotransferase 31 5 - 34 unit/L    eGFR 19 mls/min/1.73/m2   Magnesium    Collection Time: 02/29/24  5:23 AM   Result Value Ref Range    Magnesium Level 2.50 1.60 - 2.60 mg/dL   Phosphorus     Collection Time: 02/29/24  5:23 AM   Result Value Ref Range    Phosphorus Level 2.1 (L) 2.3 - 4.7 mg/dL   CBC with Differential    Collection Time: 02/29/24  5:23 AM   Result Value Ref Range    WBC 8.85 4.50 - 11.50 x10(3)/mcL    RBC 3.07 (L) 4.70 - 6.10 x10(6)/mcL    Hgb 8.8 (L) 14.0 - 18.0 g/dL    Hct 28.5 (L) 42.0 - 52.0 %    MCV 92.8 80.0 - 94.0 fL    MCH 28.7 27.0 - 31.0 pg    MCHC 30.9 (L) 33.0 - 36.0 g/dL    RDW 15.0 11.5 - 17.0 %    Platelet 247 130 - 400 x10(3)/mcL    MPV 10.0 7.4 - 10.4 fL    Neut % 77.0 %    Lymph % 11.8 %    Mono % 8.5 %    Eos % 2.1 %    Basophil % 0.3 %    Lymph # 1.04 0.6 - 4.6 x10(3)/mcL    Neut # 6.81 2.1 - 9.2 x10(3)/mcL    Mono # 0.75 0.1 - 1.3 x10(3)/mcL    Eos # 0.19 0 - 0.9 x10(3)/mcL    Baso # 0.03 <=0.2 x10(3)/mcL    IG# 0.03 0 - 0.04 x10(3)/mcL    IG% 0.3 %    NRBC% 0.0 %   POCT glucose    Collection Time: 02/29/24  6:36 AM   Result Value Ref Range    POCT Glucose 203 (H) 70 - 110 mg/dL         EKG:         Telemetry:  SR    Physical Exam  Vitals and nursing note reviewed.   HENT:      Head: Normocephalic.      Nose: Nose normal.      Mouth/Throat:      Mouth: Mucous membranes are moist.   Eyes:      Pupils: Pupils are equal, round, and reactive to light.   Cardiovascular:      Rate and Rhythm: Normal rate and regular rhythm.   Pulmonary:      Effort: Pulmonary effort is normal.      Breath sounds: Normal breath sounds.   Abdominal:      General: Bowel sounds are normal.      Palpations: Abdomen is soft.   Musculoskeletal:         General: Normal range of motion.      Cervical back: Normal range of motion.   Skin:     General: Skin is warm.      Comments: Midline Incision FIORELLA   Neurological:      Mental Status: He is alert and oriented to person, place, and time.   Psychiatric:         Mood and Affect: Mood normal.         Behavior: Behavior normal.       Home Medications:   Current Facility-Administered Medications on File Prior to Encounter   Medication Dose Route  Frequency Provider Last Rate Last Admin    aspirin tablet 325 mg  325 mg Oral On Call Procedure Order, Paper   325 mg at 01/04/24 0949    diazePAM tablet 10 mg  10 mg Oral On Call Procedure Order, Paper   10 mg at 01/04/24 0949     Current Outpatient Medications on File Prior to Encounter   Medication Sig Dispense Refill    amLODIPine (NORVASC) 10 MG tablet Take 10 mg by mouth.      aspirin (ECOTRIN) 81 MG EC tablet Take 81 mg by mouth once daily.      calcitRIOL (ROCALTROL) 0.5 MCG Cap Take 0.5 mcg by mouth 4 (four) times a week.      furosemide (LASIX) 20 MG tablet Take 20 mg by mouth once daily. Take 2 tablets by mouth once a day as needed for swelling      glimepiride (AMARYL) 4 MG tablet Take 4 mg by mouth once daily.      KLOR-CON 10 10 mEq TbSR Take 10 mEq by mouth once daily.      metoprolol succinate (TOPROL-XL) 100 MG 24 hr tablet Take 100 mg by mouth once daily.      nitroGLYCERIN (NITROSTAT) 0.4 MG SL tablet Place under the tongue.      omeprazole (PRILOSEC) 40 MG capsule Take 40 mg by mouth once daily.      ranolazine (RANEXA) 500 MG Tb12 Take 500 mg by mouth 2 (two) times daily.      sodium bicarbonate 650 MG tablet Take 650 mg by mouth 2 (two) times daily.      acyclovir (ZOVIRAX) 400 MG tablet Take 400 mg by mouth as needed.      ALPRAZolam (XANAX) 1 MG tablet Take 1 mg by mouth 2 (two) times daily.      amitriptyline (ELAVIL) 50 MG tablet Take 50 mg by mouth every evening.      atenoloL (TENORMIN) 50 MG tablet Take 50 mg by mouth once daily.      cilostazoL (PLETAL) 50 MG Tab Take 50 mg by mouth 2 (two) times daily.      clopidogreL (PLAVIX) 75 mg tablet Take 75 mg by mouth once daily.      olmesartan-hydrochlorothiazide (BENICAR HCT) 40-25 mg per tablet Take 1 tablet by mouth once daily.      rosuvastatin (CRESTOR) 40 MG Tab Take 40 mg by mouth every evening.      sildenafiL (VIAGRA) 100 MG tablet Take 100 mg by mouth daily as needed for Erectile Dysfunction.       Current Inpatient  Medications:    Current Facility-Administered Medications:     acetaminophen oral solution 650 mg, 650 mg, Per OG tube, Q6H PRN, Yocham, Senait, FNP, 650 mg at 02/24/24 0207    acetaminophen-codeine 300-30mg per tablet 1 tablet, 1 tablet, Oral, Q6H PRN, Yocham, Senait, FNP, 1 tablet at 02/25/24 2031    aspirin EC tablet 81 mg, 81 mg, Oral, Daily, Darnell Stevenson P, PA, 81 mg at 02/29/24 1006    bisacodyL suppository 10 mg, 10 mg, Rectal, Daily PRN, Darnell Stevenson P, PA    calcium gluconate 1 g in NS IVPB (premixed), 1 g, Intravenous, PRN, Yocham, Senait, FNP    calcium gluconate 1 g in NS IVPB (premixed), 2 g, Intravenous, PRN, Yocham, Senait, FNP    calcium gluconate 1 g in NS IVPB (premixed), 3 g, Intravenous, PRN, Yocham, Senait, FNP    clopidogreL tablet 75 mg, 75 mg, Oral, Daily, Darnell Stevenson P, PA, 75 mg at 02/29/24 1006    dextrose 10% bolus 125 mL 125 mL, 12.5 g, Intravenous, PRN, Yocham, Senait, FNP    dextrose 10% bolus 125 mL 125 mL, 12.5 g, Intravenous, PRN, Stroda, Yassine, DO    dextrose 10% bolus 250 mL 250 mL, 25 g, Intravenous, PRN, Yocham, Senait, FNP    dextrose 10% bolus 250 mL 250 mL, 25 g, Intravenous, PRN, Stroda, Yassine, DO    docusate sodium capsule 200 mg, 200 mg, Oral, BID, Darnell Stevenson P, PA, 200 mg at 02/29/24 1006    enoxaparin injection 40 mg, 40 mg, Subcutaneous, Daily, Yocham, Senait, FNP, 40 mg at 02/28/24 1623    famotidine (PF) injection 20 mg, 20 mg, Intravenous, Daily, Yocham, Senait, FNP, 20 mg at 02/29/24 1006    folic acid tablet 1 mg, 1 mg, Oral, Daily, Yocham, Senait, FNP, 1 mg at 02/29/24 1006    glucagon (human recombinant) injection 1 mg, 1 mg, Intramuscular, PRN, Stroda, Yassine, DO    glucose chewable tablet 16 g, 16 g, Oral, PRN, Stroda, Yassine, DO    glucose chewable tablet 24 g, 24 g, Oral, PRN, Stroda, Yassine, DO    hydrALAZINE injection 10 mg, 10 mg, Intravenous, Q2H PRN, Darnell Stevenson P, PA    insulin aspart U-100 injection 0-10 Units, 0-10 Units, Subcutaneous, QID (AC +  HS) PRN, Yassine Haas, DO, 4 Units at 02/29/24 0646    lactulose 10 gram/15 ml solution 20 g, 20 g, Oral, Q6H PRN, Yocham, Senait, FNP    loperamide capsule 2 mg, 2 mg, Oral, Continuous PRN, Yocham, Senait, FNP    magnesium sulfate 2g in water 50mL IVPB (premix), 2 g, Intravenous, PRN, Yocham, Senait, FNP    magnesium sulfate 2g in water 50mL IVPB (premix), 4 g, Intravenous, PRN, Yocham, Senait, FNP    metoclopramide injection 5 mg, 5 mg, Intravenous, Q6H PRN, Yocham, Senait, FNP    metoprolol tartrate (LOPRESSOR) split tablet 12.5 mg, 12.5 mg, Oral, BID, Yocham, Senait, FNP, 12.5 mg at 02/29/24 1006    ondansetron injection 4 mg, 4 mg, Intravenous, Q4H PRN, Yocham, Senait, FNP, 4 mg at 02/23/24 1205    polyethylene glycol packet 17 g, 17 g, Oral, Daily PRN, Darnell Stevenson, PA    sodium bicarbonate tablet 1,300 mg, 1,300 mg, Oral, BID, Kya Eugene ACNP, 1,300 mg at 02/29/24 1006    sodium phosphate 15 mmol in dextrose 5 % (D5W) 250 mL IVPB, 15 mmol, Intravenous, PRN, Yocham, Senait, FNP    sodium phosphate 20.01 mmol in dextrose 5 % (D5W) 250 mL IVPB, 20.01 mmol, Intravenous, PRN, Yocham, Senait, FNP    sodium phosphate 30 mmol in dextrose 5 % (D5W) 250 mL IVPB, 30 mmol, Intravenous, PRN, Yocham, Senait, FNP    sucralfate tablet 1 g, 1 g, Oral, QID (AC & HS), Yocham, Senait, FNP, 1 g at 02/29/24 1006    Facility-Administered Medications Ordered in Other Encounters:     aspirin tablet 325 mg, 325 mg, Oral, On Call Procedure, Order, Paper, 325 mg at 01/04/24 0949    diazePAM tablet 10 mg, 10 mg, Oral, On Call Procedure, Order, Paper, 10 mg at 01/04/24 0949  VTE Risk Mitigation (From admission, onward)           Ordered     enoxaparin injection 40 mg  Daily         02/22/24 1143     Place sequential compression device  Until discontinued         02/22/24 1143     IP VTE HIGH RISK PATIENT  Once         02/22/24 0855                  Assessment:   Newly Diagnosed AFIB - Currently SR    - [id number] Score 2 (2.2%  Stroke Risk per Year)    - s/p LAAL on 2.22.24    - possibly early SSS  CAD    - CABG x3 (2.22.24): LIMA to LAD, SV to OM1, SV to PDA, Endarterectomy OM1, LAAL  Bilateral YURY  Claudication with PAD  HTN  Angiectasia  DM II  CKD IV  Anemia  Arthritis  Obesity  No known history of GI Bleed     Plan:   EKG and Telemetry Reviewed    - SB, SVT, and Bigeminy noted on review  Increase Metoprolol to 25 mg oral BID with Hold Parameters    - Patient is on Metoprolol  mg oral daily outpatient   Continue ASA, Plavix, Ranexa, and Statin  No OAC given LAAL   Keep Mag > 2 and Potassium > 4  Labs in AM: CBC, BMP, and Mag     Thank you for your consult.     JUSTO Irwin  Cardiology  [facility] - [address] Medical Telemetry  02/29/2024    I agree with the findings of the complexity of problems addressed and take responsibility for the plan's risks and complications. I approved the plan documented by Alexus York NP.  Increase B-blockers

## 2024-03-01 VITALS
TEMPERATURE: 99 F | DIASTOLIC BLOOD PRESSURE: 81 MMHG | RESPIRATION RATE: 18 BRPM | BODY MASS INDEX: 35.17 KG/M2 | SYSTOLIC BLOOD PRESSURE: 129 MMHG | OXYGEN SATURATION: 97 % | HEIGHT: 75 IN | HEART RATE: 75 BPM | WEIGHT: 282.88 LBS

## 2024-03-01 PROBLEM — I25.10 CAD (CORONARY ARTERY DISEASE): Status: ACTIVE | Noted: 2024-03-01

## 2024-03-01 LAB
ALBUMIN SERPL-MCNC: 2.7 G/DL (ref 3.4–4.8)
ALBUMIN/GLOB SERPL: 0.7 RATIO (ref 1.1–2)
ALP SERPL-CCNC: 90 UNIT/L (ref 40–150)
ALT SERPL-CCNC: 24 UNIT/L (ref 0–55)
AST SERPL-CCNC: 28 UNIT/L (ref 5–34)
BASOPHILS # BLD AUTO: 0.02 X10(3)/MCL
BASOPHILS NFR BLD AUTO: 0.2 %
BILIRUB SERPL-MCNC: 0.6 MG/DL
BUN SERPL-MCNC: 29.2 MG/DL (ref 8.4–25.7)
CALCIUM SERPL-MCNC: 8.5 MG/DL (ref 8.8–10)
CHLORIDE SERPL-SCNC: 108 MMOL/L (ref 98–107)
CO2 SERPL-SCNC: 23 MMOL/L (ref 23–31)
CREAT SERPL-MCNC: 3.48 MG/DL (ref 0.73–1.18)
EOSINOPHIL # BLD AUTO: 0.28 X10(3)/MCL (ref 0–0.9)
EOSINOPHIL NFR BLD AUTO: 3.2 %
ERYTHROCYTE [DISTWIDTH] IN BLOOD BY AUTOMATED COUNT: 15.1 % (ref 11.5–17)
GFR SERPLBLD CREATININE-BSD FMLA CKD-EPI: 19 MLS/MIN/1.73/M2
GLOBULIN SER-MCNC: 4 GM/DL (ref 2.4–3.5)
GLUCOSE SERPL-MCNC: 184 MG/DL (ref 82–115)
HCT VFR BLD AUTO: 28.6 % (ref 42–52)
HGB BLD-MCNC: 8.7 G/DL (ref 14–18)
IMM GRANULOCYTES # BLD AUTO: 0.04 X10(3)/MCL (ref 0–0.04)
IMM GRANULOCYTES NFR BLD AUTO: 0.5 %
LYMPHOCYTES # BLD AUTO: 1.01 X10(3)/MCL (ref 0.6–4.6)
LYMPHOCYTES NFR BLD AUTO: 11.5 %
MCH RBC QN AUTO: 28 PG (ref 27–31)
MCHC RBC AUTO-ENTMCNC: 30.4 G/DL (ref 33–36)
MCV RBC AUTO: 92 FL (ref 80–94)
MONOCYTES # BLD AUTO: 0.69 X10(3)/MCL (ref 0.1–1.3)
MONOCYTES NFR BLD AUTO: 7.8 %
NEUTROPHILS # BLD AUTO: 6.77 X10(3)/MCL (ref 2.1–9.2)
NEUTROPHILS NFR BLD AUTO: 76.8 %
NRBC BLD AUTO-RTO: 0 %
OHS QRS DURATION: 100 MS
OHS QRS DURATION: 98 MS
OHS QTC CALCULATION: 382 MS
OHS QTC CALCULATION: 533 MS
PLATELET # BLD AUTO: 297 X10(3)/MCL (ref 130–400)
PMV BLD AUTO: 9.8 FL (ref 7.4–10.4)
POCT GLUCOSE: 240 MG/DL (ref 70–110)
POTASSIUM SERPL-SCNC: 3.7 MMOL/L (ref 3.5–5.1)
PROT SERPL-MCNC: 6.7 GM/DL (ref 5.8–7.6)
RBC # BLD AUTO: 3.11 X10(6)/MCL (ref 4.7–6.1)
SODIUM SERPL-SCNC: 141 MMOL/L (ref 136–145)
WBC # SPEC AUTO: 8.81 X10(3)/MCL (ref 4.5–11.5)

## 2024-03-01 PROCEDURE — 25000003 PHARM REV CODE 250

## 2024-03-01 PROCEDURE — 85025 COMPLETE CBC W/AUTO DIFF WBC: CPT

## 2024-03-01 PROCEDURE — 80053 COMPREHEN METABOLIC PANEL: CPT

## 2024-03-01 PROCEDURE — 99024 POSTOP FOLLOW-UP VISIT: CPT | Mod: ,,,

## 2024-03-01 PROCEDURE — 97110 THERAPEUTIC EXERCISES: CPT

## 2024-03-01 PROCEDURE — 25000003 PHARM REV CODE 250: Performed by: NURSE PRACTITIONER

## 2024-03-01 PROCEDURE — 25000003 PHARM REV CODE 250: Performed by: PHYSICIAN ASSISTANT

## 2024-03-01 RX ORDER — METOPROLOL SUCCINATE 50 MG/1
100 TABLET, EXTENDED RELEASE ORAL DAILY
Status: DISCONTINUED | OUTPATIENT
Start: 2024-03-02 | End: 2024-03-01 | Stop reason: HOSPADM

## 2024-03-01 RX ORDER — ACETAMINOPHEN AND CODEINE PHOSPHATE 300; 30 MG/1; MG/1
1 TABLET ORAL EVERY 6 HOURS PRN
Qty: 28 TABLET | Refills: 0 | Status: SHIPPED | OUTPATIENT
Start: 2024-03-01 | End: 2024-03-11

## 2024-03-01 RX ORDER — ATORVASTATIN CALCIUM 80 MG/1
80 TABLET, FILM COATED ORAL DAILY
Qty: 90 TABLET | Refills: 3 | Status: SHIPPED | OUTPATIENT
Start: 2024-03-02 | End: 2025-03-02

## 2024-03-01 RX ORDER — FUROSEMIDE 40 MG/1
40 TABLET ORAL DAILY
Qty: 30 TABLET | Refills: 0 | Status: SHIPPED | OUTPATIENT
Start: 2024-03-02 | End: 2024-04-01

## 2024-03-01 RX ORDER — RANOLAZINE 500 MG/1
500 TABLET, EXTENDED RELEASE ORAL 2 TIMES DAILY
Qty: 60 TABLET | Refills: 11 | Status: SHIPPED | OUTPATIENT
Start: 2024-03-01 | End: 2025-03-01

## 2024-03-01 RX ADMIN — SUCRALFATE 1 G: 1 TABLET ORAL at 11:03

## 2024-03-01 RX ADMIN — FUROSEMIDE 40 MG: 40 TABLET ORAL at 10:03

## 2024-03-01 RX ADMIN — ASPIRIN 81 MG: 81 TABLET, COATED ORAL at 10:03

## 2024-03-01 RX ADMIN — FAMOTIDINE 20 MG: 10 INJECTION, SOLUTION INTRAVENOUS at 10:03

## 2024-03-01 RX ADMIN — SODIUM BICARBONATE 650 MG TABLET 1300 MG: at 10:03

## 2024-03-01 RX ADMIN — FOLIC ACID 1 MG: 1 TABLET ORAL at 10:03

## 2024-03-01 RX ADMIN — METOPROLOL TARTRATE 25 MG: 25 TABLET, FILM COATED ORAL at 10:03

## 2024-03-01 RX ADMIN — RANOLAZINE 500 MG: 500 TABLET, EXTENDED RELEASE ORAL at 10:03

## 2024-03-01 RX ADMIN — DOCUSATE SODIUM 200 MG: 100 CAPSULE, LIQUID FILLED ORAL at 10:03

## 2024-03-01 RX ADMIN — CLOPIDOGREL BISULFATE 75 MG: 75 TABLET ORAL at 10:03

## 2024-03-01 RX ADMIN — ATORVASTATIN CALCIUM 80 MG: 40 TABLET, FILM COATED ORAL at 10:03

## 2024-03-01 NOTE — PT/OT/SLP PROGRESS
Pt refused PT this afternoon as he is going home today. PT recommended SNF, however pt chose to go home with HH. Asked pt if he has everything he needs, and he stated he does not have a walker. He only has a cane. Pt has been exclusively using a RW while here. Notified RN and CM that pt needs a RW for d/c.

## 2024-03-01 NOTE — PT/OT/SLP PROGRESS
Occupational Therapy   Treatment    Name: Rustam Matthews  MRN: [id number]  Admitting Diagnosis:  CAD (coronary artery disease)  8 Days Post-Op    Recommendations:     Recommended therapy intensity at discharge: Moderate Intensity Therapy   Discharge Equipment Recommendations:  to be determined by next level of care  Barriers to discharge:       Assessment:     Rustam Matthews is a 63 y.o. male with a medical diagnosis of CAD (coronary artery disease).  He presents with with minimal motivation to participate in therapy. Therapist instructed pt on BUE HEP while seated. Pt declined out of chair mobility at this time. Performance deficits affecting function are weakness, impaired endurance, impaired self care skills, impaired functional mobility, gait instability, impaired balance, decreased upper extremity function, decreased lower extremity function, pain, impaired cardiopulmonary response to activity.     Rehab Prognosis:  Good; patient would benefit from acute skilled OT services to address these deficits and reach maximum level of function.       Plan:     Patient to be seen 4 x/week to address the above listed problems via self-care/home management  Plan of Care Expires: 03/23/24  Plan of Care Reviewed with: patient    Subjective     Pain/Comfort:  Pain Rating 1: 0/10    Objective:     Communicated with: Nurse prior to session.  Patient found up in chair with peripheral IV upon OT entry to room.    General Precautions: Standard, fall, sternal    Orthopedic Precautions:N/A  Braces: N/A  Respiratory Status: Room air     Occupational Performance:     Functional Mobility/Transfers:  Functional Mobility: Pt declined mobility    Therapeutic Exercise:  Instructed pt on HEP of BUE's while seated in recliner, including shoulder, elbow, wrist, and digits flexion extension to promote circulation and AROM as pt with edema noted in LUE. Pt demo'd good return.     Therapeutic Positioning    OT interventions performed during the  course of today's session in an effort to prevent and/or reduce acquired pressure injuries:   Education was provided on benefits of and recommendations for therapeutic positioning     Findings:  visible skin intact    Patient Education:  Patient provided with verbal education and demonstrations education regarding OT role/goals/POC, safety awareness, and home exercise program .  Understanding was verbalized.      Patient left up in chair with all lines intact and call button in reach    GOALS:   Multidisciplinary Problems       Occupational Therapy Goals          Problem: Occupational Therapy    Goal Priority Disciplines Outcome Interventions   Occupational Therapy Goal     OT, PT/OT Ongoing, Progressing    Description: Goals to be met by: 2/23/24     Patient will increase functional independence with ADLs by performing:    UE Dressing with Minimal Assistance.  LE Dressing with Minimal Assistance.  Toileting from toilet with Minimal Assistance for hygiene and clothing management.   Toilet transfer to toilet with Minimal Assistance.                         Time Tracking:     OT Date of Treatment: 03/01/24  OT Start Time: 1045  OT Stop Time: 1056  OT Total Time (min): 11 min    Billable Minutes:Therapeutic Exercise 11 minutes    OT/FIORELLA: OT     Number of FIORELLA visits since last OT visit: 3    3/1/2024

## 2024-03-01 NOTE — PLAN OF CARE
Rolling walker order placed- pt request to have walker shipped to home. He does not want to wait for delivery. Referral sent to [facility] via [facility] with request to ship to pt home.

## 2024-03-01 NOTE — PROGRESS NOTES
Pt refusing cardiac rehab at this time despite lots of encouragement. States will walk plenty at home. Reinforced all postop instructions. Voiced understanding.  Communicated with nurse.

## 2024-03-01 NOTE — PROGRESS NOTES
Discharge instructions given to patient. Medication list and follow up appointments reviewed, all questions answered, patient voiced understanding. Patient's IV and heart monitor removed. Patient discharged home with home health.

## 2024-03-01 NOTE — DISCHARGE SUMMARY
[facility] - [address] [facility] Tele[facility]  Cardiothoracic Surgery  Discharge Summary      Patient Name: Rustam Matthews  MRN: [id number]  Admission Date: 2/22/2024  Hospital Length of Stay: 8 days  Discharge Date and Time:  03/01/2024 1:29 PM  Attending Physician: Silvana Bauman MD   Discharging Provider: JUSTO Aviles  Primary Care Provider: Vasquez Rodriguez MD    HPI:   No notes on file    Procedure(s) (LRB):  CORONARY ARTERY BYPASS GRAFT (CABG) (N/A)  SURGICAL PROCUREMENT, VEIN, ENDOSCOPIC (N/A)  EXCLUSION, LEFT ATRIAL APPENDAGE (N/A)  ENDARTERECTOMY (N/A)      Indwelling Lines/Drains at time of discharge:   Lines/Drains/Airways       None                 Hospital Course: No notes on file    Goals of Care Treatment Preferences:  Code Status: Full Code      Consults (From admission, onward)          Status Ordering Provider     Inpatient consult to Cardiology  Once        Provider:  Espinoza Tran MD    Completed FRED BURTON     Inpatient consult to Social Work/Case Management  Once        Provider:  (Not yet assigned)    Completed FRED BURTON     Inpatient consult to Cardiac Rehab  Once        Provider:  (Not yet assigned)    Acknowledged DARIAN BLAIR     Consult to Case Management/Social Work  Once        Provider:  (Not yet assigned)    DARIAN Carter     Inpatient consult to Nephrology  Once        Provider:  Leesa Lira MD    Completed FRED BURTON            Significant Diagnostic Studies: Labs: CMP   Recent Labs   Lab 02/29/24  0523 03/01/24  0431    141   K 3.9 3.7   CO2 22* 23   BUN 29.9* 29.2*   CREATININE 3.48* 3.48*   CALCIUM 8.1* 8.5*   ALBUMIN 2.6* 2.7*   BILITOT 0.5 0.6   ALKPHOS 88 90   AST 31 28   ALT 24 24   , CBC   Recent Labs   Lab 02/29/24  0523 03/01/24  0431   WBC 8.85 8.81   HGB 8.8* 8.7*   HCT 28.5* 28.6*    297   , and All labs within the past 24 hours have been reviewed    Pending Diagnostic Studies:       None            No new  Assessment & Plan notes have been filed under this hospital service since the last note was generated.  Service: Cardiovascular Surgery    There are no hospital problems to display for this patient.     Discharged Condition: good    [place] [facility]    Follow Up:   Follow-up Information       [facility] Follow up.    Specialties: Home Health Services, Home Therapy Services, Home Living Aide Services  Why: Home health will call you to schedule follow up home visit  Contact information:  [address], [address]  [facility] [address]  [phone]             Ruben Bee MD Follow up on 3/14/2024.    Specialty: Cardiology  Why: 2 week MCT Monitor @ 8:30AM  Contact information:  [address] [place] [address] [address]  [facility] [address]  [phone]               Ruben Bee MD Follow up on 4/1/2024.    Specialty: Cardiology  Why: @ 1:10PM  Contact information:  [address] [place] [address]  [facility] [address]  [phone]               Silvana Bauman MD Follow up.    Specialty: Cardiothoracic Surgery  Why: Someone from [address] [place] [address] will call the patient with an appointment date and time.  Contact information:  [address] [place] [address]  [address]  [facility] [address]  [phone]                           Patient Instructions:   No discharge procedures on file.  Medications:  Reconciled Home Medications:      Medication List        START taking these medications      acetaminophen-codeine 300-30mg 300-30 mg Tab  Commonly known as: TYLENOL #3  Take 1 tablet by mouth every 6 (six) hours as needed (pain).     atorvastatin 80 MG tablet  Commonly known as: LIPITOR  Take 1 tablet (80 mg total) by mouth once daily.  Start taking on: March 2, 2024            CHANGE how you take these medications      furosemide 40 MG tablet  Commonly known as: LASIX  Take 1 tablet (40 mg total) by mouth once daily.  Start taking on: March 2, 2024  What changed:   medication strength  how much to take  additional instructions             CONTINUE taking these medications      acyclovir 400 MG tablet  Commonly known as: ZOVIRAX  Take 400 mg by mouth as needed.     aspirin 81 MG EC tablet  Commonly known as: ECOTRIN  Take 81 mg by mouth once daily.     calcitRIOL 0.5 MCG Cap  Commonly known as: ROCALTROL  Take 0.5 mcg by mouth 4 (four) times a week.     clopidogreL 75 mg tablet  Commonly known as: PLAVIX  Take 75 mg by mouth once daily.     glimepiride 4 MG tablet  Commonly known as: AMARYL  Take 4 mg by mouth once daily.     metoprolol succinate 100 MG 24 hr tablet  Commonly known as: TOPROL-XL  Take 100 mg by mouth once daily.     nitroGLYCERIN 0.4 MG SL tablet  Commonly known as: NITROSTAT  Place under the tongue.     ranolazine 500 MG Tb12  Commonly known as: RANEXA  Take 1 tablet (500 mg total) by mouth 2 (two) times daily.     sildenafiL 100 MG tablet  Commonly known as: VIAGRA  Take 100 mg by mouth daily as needed for Erectile Dysfunction.     sodium bicarbonate 650 MG tablet  Take 650 mg by mouth 2 (two) times daily.            STOP taking these medications      ALPRAZolam 1 MG tablet  Commonly known as: XANAX     amitriptyline 50 MG tablet  Commonly known as: ELAVIL     amLODIPine 10 MG tablet  Commonly known as: NORVASC     atenoloL 50 MG tablet  Commonly known as: TENORMIN     cilostazoL 50 MG Tab  Commonly known as: PLETAL     KLOR-CON 10 10 MEQ Tbsr  Generic drug: potassium chloride     olmesartan-hydrochlorothiazide 40-25 mg per tablet  Commonly known as: BENICAR HCT     omeprazole 40 MG capsule  Commonly known as: PRILOSEC     rosuvastatin 40 MG Tab  Commonly known as: CRESTOR            Time spent on the discharge of patient: 30 minutes    JUSTO Aviles  Cardiothoracic Surgery  [facility] - [address] [facility] Tele[facility]

## 2024-03-01 NOTE — PROGRESS NOTES
CT SURGERY PROGRESS NOTE  Rustam Matthews  63 y.o.  1960    Patients Procedure: Procedure(s) (LRB):  CORONARY ARTERY BYPASS GRAFT (CABG) (N/A)  SURGICAL PROCUREMENT, VEIN, ENDOSCOPIC (N/A)  EXCLUSION, LEFT ATRIAL APPENDAGE (N/A)  ENDARTERECTOMY (N/A)    Subjective  Interval History: Patient is postoperative day 8 from CABG x 3 and ligation of left atrial appendage.  Denies chest pain, shortness of breath, or palpitations.  Cardiology consulted and evaluated patient yesterday - med adjustments made.  No complaints - reports he is ready to go home.     Review of Systems   Constitutional:  Negative for chills and fever.   Respiratory:  Negative for cough, shortness of breath and wheezing.    Cardiovascular:  Positive for chest pain (incisional). Negative for palpitations and leg swelling.   Gastrointestinal:  Negative for nausea and vomiting.    Medication List  Infusions   loperamide       Scheduled   aspirin  81 mg Oral Daily    atorvastatin  80 mg Oral Daily    clopidogreL  75 mg Oral Daily    docusate sodium  200 mg Oral BID    enoxparin  40 mg Subcutaneous Daily    famotidine (PF)  20 mg Intravenous Daily    folic acid  1 mg Oral Daily    furosemide  40 mg Oral Daily    [START ON 3/2/2024] metoprolol succinate  100 mg Oral Daily    ranolazine  500 mg Oral BID    sodium bicarbonate  1,300 mg Oral BID    sucralfate  1 g Oral QID (AC & HS)       Objective:  Recent Vitals:  Temp:  [97.9 °F (36.6 °C)-99.3 °F (37.4 °C)] 98.7 °F (37.1 °C)  Pulse:  [75-94] 75  Resp:  [18-20] 18  SpO2:  [96 %-100 %] 97 %  BP: (109-141)/(69-81) 129/81    Physical Exam  Constitutional:       General: He is not in acute distress.     Appearance: He is obese. He is not ill-appearing.   HENT:      Head: Normocephalic and atraumatic.   Cardiovascular:      Rate and Rhythm: Normal rate and regular rhythm.      Pulses: Normal pulses.      Heart sounds: Normal heart sounds. No murmur heard.     No friction rub. No gallop.   Pulmonary:       Effort: Pulmonary effort is normal. No respiratory distress.      Breath sounds: No wheezing, rales, or rhonchi.    Abdominal:      General: Bowel sounds are normal. There is no distension.      Palpations: Abdomen is soft.   Musculoskeletal:      Right lower leg: No edema     Left lower leg: No edema  Skin:     General: Skin is warm and dry.      Comments: Median sternotomy dressing removed.  Wound clean, dry, and intact.    Neurological:      General: No focal deficit present.      Mental Status: He is alert and oriented to person, place, and time.   Psychiatric:         Mood and Affect: Mood normal.         Behavior: Behavior normal.     I/O last 24 hrs:  Intake/Output - Last 3 Shifts         02/28 0700  02/29 0659 02/29 0700  03/01 0659 03/01 0700  03/02 0659    P.O. 360 1000     Total Intake(mL/kg) 360 (2.8) 1000 (7.8)     Urine (mL/kg/hr) 500 (0.2) 975 (0.3) 200 (0.2)    Stool 0 0     Total Output 500 975 200    Net -140 +25 -200           Stool Occurrence 0 x 0 x             Labs  CBC:   Recent Labs   Lab 03/01/24  0431   WBC 8.81   RBC 3.11*   HGB 8.7*   HCT 28.6*      MCV 92.0   MCH 28.0   MCHC 30.4*     CMP:   Recent Labs   Lab 03/01/24  0431   CALCIUM 8.5*   ALBUMIN 2.7*      K 3.7   CO2 23   BUN 29.2*   CREATININE 3.48*   ALKPHOS 90   ALT 24   AST 28   BILITOT 0.6     LFTs:   Recent Labs   Lab 03/01/24  0431   ALT 24   AST 28   ALKPHOS 90   BILITOT 0.6   ALBUMIN 2.7*     All pertinent labs from the last 24 hours have been reviewed.      Imaging:   CXR: No results found in the last 24 hours.  I have reviewed all pertinent imaging results/findings within the past 24 hours.        ASSESSMENT/PLAN:    Severe coronary artery disease  -s/p CABG and LLAA  DM  HTN  CKD  -nephrology signed off    -Labs reviewed, all stable.  H&H 8.7 / 28.6.  Cr 3.48 (baseline)  -Advised to continue to wear TEDs, even at home  -Cardiology recs noted and appreciated  -OK to discharge from CT surgery standpoint.  Follow  up with Dr. Bauman in 3 weeks    Case and plan of care discussed with JUSTO Elena

## 2024-03-02 PROCEDURE — G0180 MD CERTIFICATION HHA PATIENT: HCPCS | Mod: ,,, | Performed by: THORACIC SURGERY (CARDIOTHORACIC VASCULAR SURGERY)

## 2024-03-04 ENCOUNTER — PATIENT OUTREACH (OUTPATIENT)
Dept: ADMINISTRATIVE | Facility: CLINIC | Age: 64
End: 2024-03-04
Payer: MEDICARE

## 2024-03-04 NOTE — PROGRESS NOTES
C3 nurse spoke with Rustam Matthews for a TCC post hospital discharge follow up call. The patient does not have a scheduled HOSFU appointment with Vasquez Rodriguez MD within 5-7 days post hospital discharge date 3/1/24. C3 nurse was unable to schedule HOSFU appointment in Epic or route message to PCP.  Advised patient to call and schedule appt within 5-7 days of hospital discharge.

## 2024-03-08 ENCOUNTER — HOSPITAL ENCOUNTER (EMERGENCY)
Facility: HOSPITAL | Age: 64
Discharge: HOME | End: 2024-03-08
Attending: FAMILY MEDICINE
Payer: MEDICARE

## 2024-03-08 VITALS
SYSTOLIC BLOOD PRESSURE: 108 MMHG | RESPIRATION RATE: 21 BRPM | DIASTOLIC BLOOD PRESSURE: 61 MMHG | HEART RATE: 60 BPM | BODY MASS INDEX: 33.8 KG/M2 | HEIGHT: 75 IN | OXYGEN SATURATION: 100 % | WEIGHT: 271.81 LBS | TEMPERATURE: 98 F
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
## 2024-03-08 DIAGNOSIS — R07.9 CHEST PAIN: ICD-10-CM

## 2024-03-08 LAB
ALBUMIN SERPL-MCNC: 3.7 G/DL (ref 3.4–5)
ALBUMIN/GLOB SERPL: 0.9 RATIO
ALP SERPL-CCNC: 120 UNIT/L (ref 50–144)
ALT SERPL-CCNC: 28 UNIT/L (ref 1–45)
ANION GAP SERPL CALC-SCNC: 9 MEQ/L (ref 2–13)
AST SERPL-CCNC: 31 UNIT/L (ref 17–59)
BASOPHILS # BLD AUTO: 0.03 X10(3)/MCL (ref 0.01–0.08)
BASOPHILS NFR BLD AUTO: 0.3 % (ref 0.1–1.2)
BILIRUB SERPL-MCNC: 1.1 MG/DL (ref 0–1)
BNP BLD-MCNC: 4660 PG/ML (ref 0–124.9)
BUN SERPL-MCNC: 22 MG/DL (ref 7–20)
CALCIUM SERPL-MCNC: 8.7 MG/DL (ref 8.4–10.2)
CHLORIDE SERPL-SCNC: 105 MMOL/L (ref 98–110)
CO2 SERPL-SCNC: 27 MMOL/L (ref 21–32)
CREAT SERPL-MCNC: 3.15 MG/DL (ref 0.66–1.25)
CREAT/UREA NIT SERPL: 7 (ref 12–20)
EOSINOPHIL # BLD AUTO: 0.15 X10(3)/MCL (ref 0.04–0.54)
EOSINOPHIL NFR BLD AUTO: 1.7 % (ref 0.7–7)
ERYTHROCYTE [DISTWIDTH] IN BLOOD BY AUTOMATED COUNT: 15.7 %
GFR SERPLBLD CREATININE-BSD FMLA CKD-EPI: 21 MLS/MIN/1.73/M2
GLOBULIN SER-MCNC: 4.1 GM/DL (ref 2–3.9)
GLUCOSE SERPL-MCNC: 183 MG/DL (ref 70–115)
HCT VFR BLD AUTO: 31.4 % (ref 36–52)
HGB BLD-MCNC: 10 G/DL (ref 13–18)
IMM GRANULOCYTES # BLD AUTO: 0.03 X10(3)/MCL (ref 0–0.03)
IMM GRANULOCYTES NFR BLD AUTO: 0.3 % (ref 0–0.5)
INR PPP: 1.2
LYMPHOCYTES # BLD AUTO: 1.15 X10(3)/MCL (ref 1.32–3.57)
LYMPHOCYTES NFR BLD AUTO: 13.1 % (ref 20–55)
MCH RBC QN AUTO: 28.9 PG (ref 27–34)
MCHC RBC AUTO-ENTMCNC: 31.8 G/DL (ref 31–37)
MCV RBC AUTO: 90.8 FL (ref 79–99)
MONOCYTES # BLD AUTO: 0.54 X10(3)/MCL (ref 0.3–0.82)
MONOCYTES NFR BLD AUTO: 6.1 % (ref 4.7–12.5)
NEUTROPHILS # BLD AUTO: 6.89 X10(3)/MCL (ref 1.78–5.38)
NEUTROPHILS NFR BLD AUTO: 78.5 % (ref 37–73)
NRBC BLD AUTO-RTO: 0 %
PLATELET # BLD AUTO: 465 X10(3)/MCL (ref 140–371)
PMV BLD AUTO: 9.4 FL (ref 9.4–12.4)
POTASSIUM SERPL-SCNC: 3 MMOL/L (ref 3.5–5.1)
PROT SERPL-MCNC: 7.8 GM/DL (ref 6.3–8.2)
PROTHROMBIN TIME: 11.8 SECONDS (ref 9.3–11.9)
RBC # BLD AUTO: 3.46 X10(6)/MCL (ref 4–6)
SODIUM SERPL-SCNC: 141 MMOL/L (ref 135–145)
TROPONIN I SERPL-MCNC: 0.3 NG/ML (ref 0–0.03)
WBC # SPEC AUTO: 8.79 X10(3)/MCL (ref 4–11.5)

## 2024-03-08 PROCEDURE — 85025 COMPLETE CBC W/AUTO DIFF WBC: CPT | Performed by: FAMILY MEDICINE

## 2024-03-08 PROCEDURE — 36415 COLL VENOUS BLD VENIPUNCTURE: CPT | Performed by: FAMILY MEDICINE

## 2024-03-08 PROCEDURE — 85610 PROTHROMBIN TIME: CPT | Performed by: FAMILY MEDICINE

## 2024-03-08 PROCEDURE — 93010 ELECTROCARDIOGRAM REPORT: CPT | Mod: ,,, | Performed by: INTERNAL MEDICINE

## 2024-03-08 PROCEDURE — 93005 ELECTROCARDIOGRAM TRACING: CPT

## 2024-03-08 PROCEDURE — 80053 COMPREHEN METABOLIC PANEL: CPT | Performed by: FAMILY MEDICINE

## 2024-03-08 PROCEDURE — 84484 ASSAY OF TROPONIN QUANT: CPT | Performed by: FAMILY MEDICINE

## 2024-03-08 PROCEDURE — 99285 EMERGENCY DEPT VISIT HI MDM: CPT | Mod: 25

## 2024-03-08 PROCEDURE — 83880 ASSAY OF NATRIURETIC PEPTIDE: CPT | Performed by: FAMILY MEDICINE

## 2024-03-08 NOTE — ED PROVIDER NOTES
Encounter Date: 3/8/2024       History     Chief Complaint   Patient presents with    Nausea    Vomiting    Weakness    Dizziness    diaphoresis    Leg Pain    Chest Pain     Pt c/o mid sub sternal CP onset 1 hour pta.  Pt reports a fall approx. 90 min ago and reports accompanying left leg pain that radiates up to buttocks with n/v, weakness and dizziness.  Pt is currently 6 days post op triple bypass from [facility].      Fall     This is a 63-year-old  male who fell prior to presentation.  He says that he has not hurting anywhere.  After he fell the sister called ambulance and when ambulance arrived the patient did not want to come to the hospital because he was not hurting anywhere but EMS pressed and pressed until he agreed to be transported over here.  He had triple bypass 8 days ago and told me that he was warned that his troponin we be high.  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  He say he got overheated and then fell.  He has past medical history of heart disease anemia diabetes mellitus, hypertension, renal disease, and peripheral artery disease    The history is provided by the patient and the EMS personnel.     Review of patient's allergies indicates:   Allergen Reactions    Allergenic extract-food-shrimp Rash     All seafood      Lactose Rash    Mayonnaise Rash     Past Medical History:   Diagnosis Date    Abnormal cardiovascular stress test     Acute anemia     Angiectasia     Angina pectoris     Arthritis     Atherosclerosis of native arteries of extremities with intermittent claudication, unspecified extremity     Back pain     Coronary artery disease     Coronary artery disease, unspecified vessel or lesion type, unspecified whether angina present, unspecified whether native or transplanted heart     Diabetes mellitus     Heart disease     Hypertension     Obesity     Peripheral artery disease     Renal disorder     kidney disease Stage IV     "SOB (shortness of breath)     Weakness of both legs      Past Surgical History:   Procedure Laterality Date    COLONOSCOPY      CORONARY ANGIOGRAPHY Right 01/04/2024    Procedure: ANGIOGRAM, CORONARY ARTERY;  Surgeon: Ruben Bee MD;  Location: [facility] CATH LAB;  Service: Cardiology;  Laterality: Right;    CORONARY ARTERY BYPASS GRAFT (CABG) N/A 2/22/2024    Procedure: CORONARY ARTERY BYPASS GRAFT (CABG);  Surgeon: Silvana Bauman MD;  Location: [facility] OR;  Service: Cardiovascular;  Laterality: N/A;    ENDARTERECTOMY N/A 2/22/2024    Procedure: ENDARTERECTOMY;  Surgeon: Silvana Bauman MD;  Location: [facility] OR;  Service: Cardiovascular;  Laterality: N/A;  CORONARY ENDARTERECTOMY    ENDOSCOPIC HARVEST OF VEIN N/A 2/22/2024    Procedure: SURGICAL PROCUREMENT, VEIN, ENDOSCOPIC;  Surgeon: Silvana Bauman MD;  Location: [facility] OR;  Service: Cardiovascular;  Laterality: N/A;    EXCLUSION OF LEFT ATRIAL APPENDAGE N/A 2/22/2024    Procedure: EXCLUSION, LEFT ATRIAL APPENDAGE;  Surgeon: Silvana Bauman MD;  Location: [facility] OR;  Service: Cardiovascular;  Laterality: N/A;    LEFT HEART CATHETERIZATION Right 01/04/2024    Procedure: Left heart cath;  Surgeon: Ruben Bee MD;  Location: [facility] CATH LAB;  Service: Cardiology;  Laterality: Right;  DIAGNOSTIC PERIPHERAL    PERIPHERAL ANGIOGRAPHY      PLACEMENT-STENT      pt states "in one of my legs"    UPPER GASTROINTESTINAL ENDOSCOPY       No family history on file.  Social History     Tobacco Use    Smoking status: Some Days     Current packs/day: 0.00     Types: Cigarettes, Vaping with nicotine     Last attempt to quit: 2/1/2021     Years since quitting: 3.0    Smokeless tobacco: Never    Tobacco comments:     Quit smoking 2 years ago-only vapes some days    Substance Use Topics    Alcohol use: Never    Drug use: Never     Review of Systems   Constitutional:  Negative for fever.   HENT:  Negative for congestion.    Eyes:  Negative for visual disturbance.   Respiratory:  " Negative for shortness of breath.    Cardiovascular:  Negative for chest pain.   Gastrointestinal:  Negative for abdominal pain.   Skin:  Negative for rash.   Neurological:  Negative for dizziness.   Psychiatric/Behavioral:  Negative for behavioral problems.    All other systems reviewed and are negative.      Physical Exam     Initial Vitals [03/08/24 1545]   BP Pulse Resp Temp SpO2   108/61 (!) 57 16 98.4 °F (36.9 °C) 100 %      MAP       --         Physical Exam    Nursing note and vitals reviewed.  Constitutional: He appears well-developed and well-nourished.   HENT:   Head: Normocephalic and atraumatic.   Eyes: Pupils are equal, round, and reactive to light.   Neck: Neck supple.   Normal range of motion.  Cardiovascular:  Normal rate, regular rhythm and normal heart sounds.           Pulmonary/Chest: Breath sounds normal.   Abdominal: Abdomen is soft. Bowel sounds are normal.   Musculoskeletal:         General: Normal range of motion.      Cervical back: Normal range of motion and neck supple.     Neurological: He is alert and oriented to person, place, and time.   Skin: Skin is warm and dry.         ED Course   Procedures  Labs Reviewed   COMPREHENSIVE METABOLIC PANEL - Abnormal; Notable for the following components:       Result Value    Potassium Level 3.0 (*)     Glucose Level 183 (*)     Blood Urea Nitrogen 22.0 (*)     Creatinine 3.15 (*)     Globulin 4.1 (*)     Bilirubin Total 1.1 (*)     BUN/Creatinine Ratio 7 (*)     All other components within normal limits   CBC WITH DIFFERENTIAL - Abnormal; Notable for the following components:    RBC 3.46 (*)     Hgb 10.0 (*)     Hct 31.4 (*)     Platelet 465 (*)     Neut % 78.5 (*)     Lymph % 13.1 (*)     Lymph # 1.15 (*)     Neut # 6.89 (*)     All other components within normal limits   TROPONIN I - Abnormal; Notable for the following components:    Troponin-I 0.302 (*)     All other components within normal limits   NT-PRO NATRIURETIC PEPTIDE - Abnormal;  Notable for the following components:    ProBNP 4,660.0 (*)     All other components within normal limits   PROTIME-INR - Normal    Narrative:     Protimes are used to monitor anticoagulant agents such as warfarin. PT INR values are based on the current patient normal mean and the ANTONIO value for the specific instrument reagent used.  **Routine theraputic target values for the INR are 2.0-3.0**   CBC W/ AUTO DIFFERENTIAL    Narrative:     The following orders were created for panel order CBC auto differential.  Procedure                               Abnormality         Status                     ---------                               -----------         ------                     CBC with Differential[[id number]]       Abnormal            Final result                 Please view results for these tests on the individual orders.     EKG Readings: (Independently Interpreted)   Rhythm: Normal Sinus Rhythm.   Ventricular rate 89, 128, QRS duration 110,        Imaging Results              X-Ray Chest AP Portable (Final result)  Result time 03/08/24 16:06:48      Final result by Bertrand Remy MD (03/08/24 16:06:48)                   Impression:      No acute abnormality.      Electronically signed by: Bertrand Remy  Date:    03/08/2024  Time:    16:06               Narrative:    EXAMINATION:  XR CHEST AP PORTABLE    CLINICAL HISTORY:  Chest pain, unspecified    TECHNIQUE:  Single frontal view of the chest was performed.    COMPARISON:  02/26/2024    FINDINGS:  Anterior sternal wire sutures present.The lungs are clear, with normal appearance of pulmonary vasculature and no pleural effusion or pneumothorax.    The cardiac silhouette is normal in size. The hilar and mediastinal contours are unremarkable.    Bones are intact.                                       Medications - No data to display  Medical Decision Making  Differential diagnosis:  Fall,  syncope, hypoglycemia, migraine headache, TIA, CVA,    Amount  and/or Complexity of Data Reviewed  Labs: ordered.  Radiology: ordered.                                      Clinical Impression:  Final diagnoses:  [R07.9] Chest pain  [W19.XXXA] Fall, initial encounter (Primary)          ED Disposition Condition    Discharge Stable          ED Prescriptions    None       Follow-up Information       Follow up With Specialties Details Why Contact Info    Vasquez Rodriguez MD Family Medicine In 2 days If symptoms worsen [address] [place] [address]  [phone]               John Limon MD  03/08/24 0170

## 2024-03-09 NOTE — ED NOTES
Pt seen and evaluated by Dr. Limon. Pt has no complaints at this time and verbalized understanding of education. Pt wheeled out of ER and assisted into PV by nurse.   No acute distress noted. See provider notes.

## 2024-03-11 LAB
OHS QRS DURATION: 110 MS
OHS QTC CALCULATION: 540 MS

## 2024-03-25 ENCOUNTER — EXTERNAL HOME HEALTH (OUTPATIENT)
Dept: HOME HEALTH SERVICES | Facility: HOSPITAL | Age: 64
End: 2024-03-25
Payer: MEDICARE

## 2024-04-03 ENCOUNTER — OFFICE VISIT (OUTPATIENT)
Dept: CARDIAC SURGERY | Facility: CLINIC | Age: 64
End: 2024-04-03
Payer: MEDICARE

## 2024-04-03 VITALS
WEIGHT: 265 LBS | BODY MASS INDEX: 32.95 KG/M2 | DIASTOLIC BLOOD PRESSURE: 87 MMHG | OXYGEN SATURATION: 98 % | HEART RATE: 89 BPM | HEIGHT: 75 IN | SYSTOLIC BLOOD PRESSURE: 120 MMHG

## 2024-04-03 DIAGNOSIS — I25.10 CORONARY ARTERY DISEASE INVOLVING NATIVE CORONARY ARTERY OF NATIVE HEART, UNSPECIFIED WHETHER ANGINA PRESENT: Primary | ICD-10-CM

## 2024-04-03 PROCEDURE — 3079F DIAST BP 80-89 MM HG: CPT | Mod: CPTII,,, | Performed by: THORACIC SURGERY (CARDIOTHORACIC VASCULAR SURGERY)

## 2024-04-03 PROCEDURE — 3074F SYST BP LT 130 MM HG: CPT | Mod: CPTII,,, | Performed by: THORACIC SURGERY (CARDIOTHORACIC VASCULAR SURGERY)

## 2024-04-03 PROCEDURE — 99024 POSTOP FOLLOW-UP VISIT: CPT | Mod: ,,, | Performed by: THORACIC SURGERY (CARDIOTHORACIC VASCULAR SURGERY)

## 2024-04-03 RX ORDER — GABAPENTIN 300 MG/1
300 CAPSULE ORAL 2 TIMES DAILY
COMMUNITY
Start: 2024-03-25

## 2024-04-03 RX ORDER — POTASSIUM CHLORIDE 750 MG/1
10 TABLET, FILM COATED, EXTENDED RELEASE ORAL
COMMUNITY
Start: 2024-03-29

## 2024-04-03 RX ORDER — HYDROCODONE BITARTRATE AND ACETAMINOPHEN 7.5; 325 MG/1; MG/1
TABLET ORAL
COMMUNITY
Start: 2024-03-20

## 2024-04-03 RX ORDER — CILOSTAZOL 50 MG/1
50 TABLET ORAL 2 TIMES DAILY
COMMUNITY
Start: 2024-03-29

## 2024-04-03 NOTE — PROGRESS NOTES
"Rustam Matthews is a 63 y.o. male patient.   No diagnosis found.  Past Medical History:   Diagnosis Date    Abnormal cardiovascular stress test     Acute anemia     Angiectasia     Angina pectoris     Arthritis     Atherosclerosis of native arteries of extremities with intermittent claudication, unspecified extremity     Back pain     Coronary artery disease     Coronary artery disease, unspecified vessel or lesion type, unspecified whether angina present, unspecified whether native or transplanted heart     Diabetes mellitus     Heart disease     Hypertension     Obesity     Peripheral artery disease     Renal disorder     kidney disease Stage IV    SOB (shortness of breath)     Weakness of both legs      Past Surgical History Pertinent Negatives:   Procedure Date Noted    ADENOIDECTOMY 2024    APPENDECTOMY 2024    BRAIN SURGERY 2024    BREAST SURGERY 2024    CARDIAC VALVE REPLACEMENT 2024     SECTION 2024    CHOLECYSTECTOMY 2024    COLON SURGERY 2024    CORONARY ARTERY BYPASS GRAFT 2024    COSMETIC SURGERY 2024    EYE SURGERY 2024    FRACTURE SURGERY 2024    HERNIA REPAIR 2024    JOINT REPLACEMENT 2024    KIDNEY TRANSPLANT 2024    LIVER TRANSPLANT 2024    PROSTATE SURGERY 2024    SMALL INTESTINE SURGERY 2024    SPINE SURGERY 2024    TONSILLECTOMY 2024    TUBAL LIGATION 2024    VASECTOMY 2024     Scheduled Meds:  Continuous Infusions:  PRN Meds:    Review of patient's allergies indicates:   Allergen Reactions    Allergenic extract-food-shrimp Rash     All seafood      Lactose Rash    Mayonnaise Rash     There are no hospital problems to display for this patient.    Blood pressure 139/79, pulse 85, height 6' 3" (1.905 m), weight 120.2 kg (265 lb), SpO2 98 %.    Subjective:  The patient returns to clinic status post coronary artery bypass grafting.  He has been complaining of " sciatica on the left side and he has been managed by his medical doctor.      Objective:  His sternum is stable.  His wounds are healing well a couple of spots that are packed very superficial with excellent granulation tissue and healing.      Assessment & Plan:  Overall doing very well.  RTC pRortorri.      Silvana Bauman MD  4/3/2024

## 2024-04-04 ENCOUNTER — DOCUMENT SCAN (OUTPATIENT)
Dept: HOME HEALTH SERVICES | Facility: HOSPITAL | Age: 64
End: 2024-04-04
Payer: MEDICARE

## 2024-04-12 ENCOUNTER — DOCUMENT SCAN (OUTPATIENT)
Dept: HOME HEALTH SERVICES | Facility: HOSPITAL | Age: 64
End: 2024-04-12
Payer: MEDICARE

## 2024-05-01 PROCEDURE — G0179 MD RECERTIFICATION HHA PT: HCPCS | Mod: ,,, | Performed by: THORACIC SURGERY (CARDIOTHORACIC VASCULAR SURGERY)

## 2024-05-09 ENCOUNTER — HOSPITAL ENCOUNTER (EMERGENCY)
Facility: HOSPITAL | Age: 64
Discharge: ANOTHER HOSPITAL | End: 2024-05-09
Attending: EMERGENCY MEDICINE
Payer: MEDICARE

## 2024-05-09 VITALS
BODY MASS INDEX: 33.92 KG/M2 | HEIGHT: 75 IN | OXYGEN SATURATION: 96 % | RESPIRATION RATE: 12 BRPM | WEIGHT: 272.81 LBS | SYSTOLIC BLOOD PRESSURE: 142 MMHG | HEART RATE: 87 BPM | TEMPERATURE: 98 F | DIASTOLIC BLOOD PRESSURE: 68 MMHG

## 2024-05-09 DIAGNOSIS — R07.9 CHEST PAIN: ICD-10-CM

## 2024-05-09 DIAGNOSIS — I21.4 NSTEMI (NON-ST ELEVATED MYOCARDIAL INFARCTION): Primary | ICD-10-CM

## 2024-05-09 LAB
ALBUMIN SERPL-MCNC: 3.5 G/DL (ref 3.4–5)
ALBUMIN/GLOB SERPL: 1 RATIO
ALP SERPL-CCNC: 111 UNIT/L (ref 50–144)
ALT SERPL-CCNC: 23 UNIT/L (ref 1–45)
ANION GAP SERPL CALC-SCNC: 7 MEQ/L (ref 2–13)
APTT PPP: 22.5 SECONDS (ref 23–29.4)
AST SERPL-CCNC: 29 UNIT/L (ref 17–59)
BASOPHILS # BLD AUTO: 0.02 X10(3)/MCL (ref 0.01–0.08)
BASOPHILS NFR BLD AUTO: 0.3 % (ref 0.1–1.2)
BILIRUB SERPL-MCNC: 0.3 MG/DL (ref 0–1)
BNP BLD-MCNC: 1920 PG/ML (ref 0–124.9)
BUN SERPL-MCNC: 28 MG/DL (ref 7–20)
CALCIUM SERPL-MCNC: 9.2 MG/DL (ref 8.4–10.2)
CHLORIDE SERPL-SCNC: 109 MMOL/L (ref 98–110)
CO2 SERPL-SCNC: 24 MMOL/L (ref 21–32)
CREAT SERPL-MCNC: 2.22 MG/DL (ref 0.66–1.25)
CREAT/UREA NIT SERPL: 13 (ref 12–20)
EOSINOPHIL # BLD AUTO: 0.23 X10(3)/MCL (ref 0.04–0.54)
EOSINOPHIL NFR BLD AUTO: 3.7 % (ref 0.7–7)
ERYTHROCYTE [DISTWIDTH] IN BLOOD BY AUTOMATED COUNT: 15.4 %
GFR SERPLBLD CREATININE-BSD FMLA CKD-EPI: 32 ML/MIN/1.73/M2
GLOBULIN SER-MCNC: 3.6 GM/DL (ref 2–3.9)
GLUCOSE SERPL-MCNC: 234 MG/DL (ref 70–115)
HCT VFR BLD AUTO: 31.4 % (ref 36–52)
HGB BLD-MCNC: 10.6 G/DL (ref 13–18)
HOLD SPECIMEN: NORMAL
HOLD SPECIMEN: NORMAL
IMM GRANULOCYTES # BLD AUTO: 0.02 X10(3)/MCL (ref 0–0.03)
IMM GRANULOCYTES NFR BLD AUTO: 0.3 % (ref 0–0.5)
INR PPP: 1
LYMPHOCYTES # BLD AUTO: 1.19 X10(3)/MCL (ref 1.32–3.57)
LYMPHOCYTES NFR BLD AUTO: 19.2 % (ref 20–55)
MCH RBC QN AUTO: 32 PG (ref 27–34)
MCHC RBC AUTO-ENTMCNC: 33.8 G/DL (ref 31–37)
MCV RBC AUTO: 94.9 FL (ref 79–99)
MONOCYTES # BLD AUTO: 0.44 X10(3)/MCL (ref 0.3–0.82)
MONOCYTES NFR BLD AUTO: 7.1 % (ref 4.7–12.5)
NEUTROPHILS # BLD AUTO: 4.31 X10(3)/MCL (ref 1.78–5.38)
NEUTROPHILS NFR BLD AUTO: 69.4 % (ref 37–73)
NRBC BLD AUTO-RTO: 0 %
PLATELET # BLD AUTO: 206 X10(3)/MCL (ref 140–371)
PMV BLD AUTO: 10.2 FL (ref 9.4–12.4)
POTASSIUM SERPL-SCNC: 3.9 MMOL/L (ref 3.5–5.1)
PROT SERPL-MCNC: 7.1 GM/DL (ref 6.3–8.2)
PROTHROMBIN TIME: 10.2 SECONDS (ref 9.3–11.9)
RBC # BLD AUTO: 3.31 X10(6)/MCL (ref 4–6)
SODIUM SERPL-SCNC: 140 MMOL/L (ref 136–145)
TROPONIN I SERPL-MCNC: 0.4 NG/ML (ref 0–0.03)
TROPONIN I SERPL-MCNC: 0.54 NG/ML (ref 0–0.03)
WBC # SPEC AUTO: 6.21 X10(3)/MCL (ref 4–11.5)

## 2024-05-09 PROCEDURE — 85025 COMPLETE CBC W/AUTO DIFF WBC: CPT | Performed by: EMERGENCY MEDICINE

## 2024-05-09 PROCEDURE — 96376 TX/PRO/DX INJ SAME DRUG ADON: CPT

## 2024-05-09 PROCEDURE — 93010 ELECTROCARDIOGRAM REPORT: CPT | Mod: ,,, | Performed by: INTERNAL MEDICINE

## 2024-05-09 PROCEDURE — 85610 PROTHROMBIN TIME: CPT

## 2024-05-09 PROCEDURE — 83880 ASSAY OF NATRIURETIC PEPTIDE: CPT | Performed by: EMERGENCY MEDICINE

## 2024-05-09 PROCEDURE — 99291 CRITICAL CARE FIRST HOUR: CPT

## 2024-05-09 PROCEDURE — 96375 TX/PRO/DX INJ NEW DRUG ADDON: CPT

## 2024-05-09 PROCEDURE — 85730 THROMBOPLASTIN TIME PARTIAL: CPT

## 2024-05-09 PROCEDURE — 96374 THER/PROPH/DIAG INJ IV PUSH: CPT

## 2024-05-09 PROCEDURE — 93005 ELECTROCARDIOGRAM TRACING: CPT

## 2024-05-09 PROCEDURE — 80053 COMPREHEN METABOLIC PANEL: CPT | Performed by: EMERGENCY MEDICINE

## 2024-05-09 PROCEDURE — 63600175 PHARM REV CODE 636 W HCPCS

## 2024-05-09 PROCEDURE — 25000003 PHARM REV CODE 250: Performed by: EMERGENCY MEDICINE

## 2024-05-09 PROCEDURE — 84484 ASSAY OF TROPONIN QUANT: CPT | Mod: 91 | Performed by: EMERGENCY MEDICINE

## 2024-05-09 RX ORDER — LABETALOL HCL 20 MG/4 ML
20 SYRINGE (ML) INTRAVENOUS
Status: COMPLETED | OUTPATIENT
Start: 2024-05-09 | End: 2024-05-09

## 2024-05-09 RX ORDER — HYDRALAZINE HYDROCHLORIDE 20 MG/ML
10 INJECTION INTRAMUSCULAR; INTRAVENOUS
Status: COMPLETED | OUTPATIENT
Start: 2024-05-09 | End: 2024-05-09

## 2024-05-09 RX ORDER — LORAZEPAM 2 MG/ML
INJECTION INTRAMUSCULAR
Status: DISCONTINUED
Start: 2024-05-09 | End: 2024-05-10 | Stop reason: HOSPADM

## 2024-05-09 RX ORDER — HYDRALAZINE HYDROCHLORIDE 20 MG/ML
20 INJECTION INTRAMUSCULAR; INTRAVENOUS
Status: COMPLETED | OUTPATIENT
Start: 2024-05-09 | End: 2024-05-09

## 2024-05-09 RX ORDER — LORAZEPAM 2 MG/ML
1 INJECTION INTRAMUSCULAR
Status: COMPLETED | OUTPATIENT
Start: 2024-05-09 | End: 2024-05-09

## 2024-05-09 RX ORDER — HYDRALAZINE HYDROCHLORIDE 20 MG/ML
INJECTION INTRAMUSCULAR; INTRAVENOUS
Status: DISCONTINUED
Start: 2024-05-09 | End: 2024-05-10 | Stop reason: HOSPADM

## 2024-05-09 RX ORDER — NAPROXEN SODIUM 220 MG/1
243 TABLET, FILM COATED ORAL
Status: COMPLETED | OUTPATIENT
Start: 2024-05-09 | End: 2024-05-09

## 2024-05-09 RX ORDER — NITROGLYCERIN 20 MG/100ML
0-50 INJECTION INTRAVENOUS CONTINUOUS
Status: DISCONTINUED | OUTPATIENT
Start: 2024-05-09 | End: 2024-05-10 | Stop reason: HOSPADM

## 2024-05-09 RX ORDER — HEPARIN SODIUM,PORCINE/D5W 25000/250
0-40 INTRAVENOUS SOLUTION INTRAVENOUS CONTINUOUS
Status: DISCONTINUED | OUTPATIENT
Start: 2024-05-09 | End: 2024-05-10 | Stop reason: HOSPADM

## 2024-05-09 RX ADMIN — HEPARIN SODIUM 12 UNITS/KG/HR: 10000 INJECTION, SOLUTION INTRAVENOUS at 05:05

## 2024-05-09 RX ADMIN — ASPIRIN 81 MG 243 MG: 81 TABLET ORAL at 04:05

## 2024-05-09 RX ADMIN — LORAZEPAM 1 MG: 2 INJECTION INTRAMUSCULAR; INTRAVENOUS at 08:05

## 2024-05-09 RX ADMIN — LABETALOL HYDROCHLORIDE 20 MG: 5 INJECTION, SOLUTION INTRAVENOUS at 07:05

## 2024-05-09 RX ADMIN — NITROGLYCERIN 1 INCH: 20 OINTMENT TOPICAL at 04:05

## 2024-05-09 RX ADMIN — LABETALOL HYDROCHLORIDE 20 MG: 5 INJECTION, SOLUTION INTRAVENOUS at 08:05

## 2024-05-09 RX ADMIN — HYDRALAZINE HYDROCHLORIDE 20 MG: 20 INJECTION INTRAMUSCULAR; INTRAVENOUS at 09:05

## 2024-05-09 RX ADMIN — NITROGLYCERIN 5 MCG/MIN: 20 INJECTION INTRAVENOUS at 10:05

## 2024-05-09 RX ADMIN — HYDRALAZINE HYDROCHLORIDE 10 MG: 20 INJECTION INTRAMUSCULAR; INTRAVENOUS at 08:05

## 2024-05-09 NOTE — ED PROVIDER NOTES
Encounter Date: 5/9/2024       History     Chief Complaint   Patient presents with    Chest Pain     Reports that he started having chest pain in the center of his chest yesterday. Had triple bypass done in February. Also reports feeling sob. Took 4 nitro tablets today and yesterday.      63-year-old male with medical history coronary artery disease status post bypass, diabetes, hypertension, obesity, peripheral arterial disease, chronic kidney disease, presents emergency department with as pain.  He says it started yesterday.  He says that it is in the center of his chest, difficult to describe but if he had to describe it maybe like a tightness.  He says that it does not radiate.  Not associated with any nausea or vomiting occasional associated shortness of breath.  No diaphoresis.  He says that he took 4 nitro yesterday which helped somewhat but he came back and then he took 4 more today helps for a minute but then it comes back he says.  He denies any swelling in his legs.  Denies any fever, chills or cough.  He says this is not the same type of pain which brought him in for his bypass, he says he did not have any pain when he had to go for his bypass.  Patient is 8-9 weeks status post CABG done at [facility] on 2/22/24.        Review of patient's allergies indicates:   Allergen Reactions    Allergenic extract-food-shrimp Rash     All seafood      Lactose Rash    Mayonnaise Rash     Past Medical History:   Diagnosis Date    Abnormal cardiovascular stress test     Acute anemia     Angiectasia     Angina pectoris     Arthritis     Atherosclerosis of native arteries of extremities with intermittent claudication, unspecified extremity     Back pain     Coronary artery disease     Coronary artery disease, unspecified vessel or lesion type, unspecified whether angina present, unspecified whether native or transplanted heart     Diabetes mellitus     Heart disease     Hypertension     Obesity     Peripheral  "artery disease     Renal disorder     kidney disease Stage IV    SOB (shortness of breath)     Weakness of both legs      Past Surgical History:   Procedure Laterality Date    COLONOSCOPY      CORONARY ANGIOGRAPHY Right 01/04/2024    Procedure: ANGIOGRAM, CORONARY ARTERY;  Surgeon: Ruben Bee MD;  Location: [facility] CATH LAB;  Service: Cardiology;  Laterality: Right;    CORONARY ARTERY BYPASS GRAFT (CABG) N/A 2/22/2024    Procedure: CORONARY ARTERY BYPASS GRAFT (CABG);  Surgeon: Silvana Bauman MD;  Location: [facility] OR;  Service: Cardiovascular;  Laterality: N/A;    ENDARTERECTOMY N/A 2/22/2024    Procedure: ENDARTERECTOMY;  Surgeon: Silvana Bauman MD;  Location: [facility] OR;  Service: Cardiovascular;  Laterality: N/A;  CORONARY ENDARTERECTOMY    ENDOSCOPIC HARVEST OF VEIN N/A 2/22/2024    Procedure: SURGICAL PROCUREMENT, VEIN, ENDOSCOPIC;  Surgeon: Silvana Bauman MD;  Location: [facility] OR;  Service: Cardiovascular;  Laterality: N/A;    EXCLUSION OF LEFT ATRIAL APPENDAGE N/A 2/22/2024    Procedure: EXCLUSION, LEFT ATRIAL APPENDAGE;  Surgeon: Silvana Bauman MD;  Location: [facility] OR;  Service: Cardiovascular;  Laterality: N/A;    LEFT HEART CATHETERIZATION Right 01/04/2024    Procedure: Left heart cath;  Surgeon: Ruben Bee MD;  Location: [facility] CATH LAB;  Service: Cardiology;  Laterality: Right;  DIAGNOSTIC PERIPHERAL    PERIPHERAL ANGIOGRAPHY      PLACEMENT-STENT      pt states "in one of my legs"    UPPER GASTROINTESTINAL ENDOSCOPY       No family history on file.  Social History     Tobacco Use    Smoking status: Some Days     Current packs/day: 0.00     Types: Cigarettes, Vaping with nicotine, Vaping w/o nicotine     Last attempt to quit: 2/1/2021     Years since quitting: 3.2    Smokeless tobacco: Never    Tobacco comments:     Quit smoking 2 years ago-only vapes some days    Substance Use Topics    Alcohol use: Never    Drug use: Never     Review of Systems   Constitutional:  Negative for chills and fever. "   HENT:  Negative for sore throat.    Respiratory:  Positive for shortness of breath (at times, occasionally).    Cardiovascular:  Positive for chest pain. Negative for palpitations and leg swelling.   Gastrointestinal:  Negative for abdominal pain, diarrhea, nausea and vomiting.   Genitourinary:  Negative for dysuria.   Musculoskeletal:  Negative for back pain.   Skin:  Negative for rash.   Neurological:  Negative for weakness.   Hematological:  Does not bruise/bleed easily.   All other systems reviewed and are negative.      Physical Exam     Initial Vitals [05/09/24 1613]   BP Pulse Resp Temp SpO2   (!) 160/75 93 20 98 °F (36.7 °C) 99 %      MAP       --         Physical Exam    Nursing note and vitals reviewed.  Constitutional: He appears well-developed and well-nourished. He is not diaphoretic. He is Obese . No distress.   HENT:   Head: Normocephalic and atraumatic.   Mouth/Throat: Oropharynx is clear and moist. No oropharyngeal exudate.   Eyes: Conjunctivae and EOM are normal. Pupils are equal, round, and reactive to light.   Neck: Neck supple. No tracheal deviation present.   Cardiovascular:  Normal rate, regular rhythm, normal heart sounds and intact distal pulses.           No murmur heard.  Pulmonary/Chest: Breath sounds normal. No stridor. No respiratory distress. He has no wheezes. He has no rhonchi. He has no rales. He exhibits no tenderness.   Midline surgical scar from CABG is clean dry and intact.  No purulent drainage.   Abdominal: Abdomen is soft. Bowel sounds are normal. He exhibits no distension. There is no abdominal tenderness. There is no rebound and no guarding.   Musculoskeletal:         General: No tenderness or edema. Normal range of motion.      Cervical back: Neck supple.     Neurological: He is alert and oriented to person, place, and time. He has normal strength and normal reflexes. No cranial nerve deficit or sensory deficit.   Skin: Skin is warm and dry. Capillary refill takes less  than 2 seconds. No rash noted. No erythema. No pallor.   Psychiatric: His behavior is normal. Thought content normal. His affect is blunt. He exhibits a depressed mood.         ED Course   Critical Care    Date/Time: 5/9/2024 4:05 PM    Performed by: Kenny Blair MD  Authorized by: Kenny Blair MD  Direct patient critical care time: 15 minutes  Additional history critical care time: 15 minutes  Ordering / reviewing critical care time: 10 minutes  Documentation critical care time: 10 minutes  Consulting other physicians critical care time: 10 minutes  Total critical care time (exclusive of procedural time) : 60 minutes  Critical care time was exclusive of separately billable procedures and treating other patients and teaching time.  Critical care was necessary to treat or prevent imminent or life-threatening deterioration of the following conditions: cardiac failure.  Critical care was time spent personally by me on the following activities: evaluation of patient's response to treatment, examination of patient, obtaining history from patient or surrogate, ordering and performing treatments and interventions, ordering and review of laboratory studies, ordering and review of radiographic studies, pulse oximetry, re-evaluation of patient's condition and review of old charts.  Subsequent provider of critical care: I assumed direction of critical care for this patient from another provider of my specialty.        Labs Reviewed   COMPREHENSIVE METABOLIC PANEL - Abnormal; Notable for the following components:       Result Value    Glucose 234 (*)     Blood Urea Nitrogen 28 (*)     Creatinine 2.22 (*)     All other components within normal limits   TROPONIN I - Abnormal; Notable for the following components:    Troponin-I 0.399 (*)     All other components within normal limits   TROPONIN I - Abnormal; Notable for the following components:    Troponin-I 0.544 (*)     All other components within normal limits    NT-PRO NATRIURETIC PEPTIDE - Abnormal; Notable for the following components:    ProBNP 1,920.0 (*)     All other components within normal limits   CBC WITH DIFFERENTIAL - Abnormal; Notable for the following components:    RBC 3.31 (*)     Hgb 10.6 (*)     Hct 31.4 (*)     Lymph % 19.2 (*)     Lymph # 1.19 (*)     All other components within normal limits   APTT - Abnormal; Notable for the following components:    PTT 22.5 (*)     All other components within normal limits   PROTIME-INR - Normal    Narrative:     Protimes are used to monitor anticoagulant agents such as warfarin. PT INR values are based on the current patient normal mean and the ANTONIO value for the specific instrument reagent used.  **Routine theraputic target values for the INR are 2.0-3.0**   CBC W/ AUTO DIFFERENTIAL    Narrative:     The following orders were created for panel order CBC auto differential.  Procedure                               Abnormality         Status                     ---------                               -----------         ------                     CBC with Differential[[id number]]       Abnormal            Final result                 Please view results for these tests on the individual orders.   EXTRA TUBES    Narrative:     The following orders were created for panel order EXTRA TUBES.  Procedure                               Abnormality         Status                     ---------                               -----------         ------                     Light Blue Top Hold[[id number]]                             Final result               Gold Top Hold[[id number]]                                   Final result                 Please view results for these tests on the individual orders.   LIGHT BLUE TOP HOLD   GOLD TOP HOLD          Imaging Results              X-Ray Chest AP Portable (Final result)  Result time 05/09/24 17:31:33      Final result by Eagle Martinez MD (05/09/24 17:31:33)                    Impression:      No acute cardiopulmonary abnormality.      Electronically signed by: Eagle Martinez MD  Date:    05/09/2024  Time:    17:31               Narrative:    EXAMINATION:  Single view chest radiograph.    CLINICAL HISTORY:  Chest Pain;    TECHNIQUE:  Single view of the chest.    COMPARISON:  Chest radiograph 03/08/2024.    FINDINGS:  The lungs are clear without consolidation or effusion.  There is no pneumothorax.  The cardiac silhouette is normal in size.  There is no acute osseous abnormality.                                       Medications   heparin 25,000 units in dextrose 5% 250 mL (100 units/mL) infusion LOW INTENSITY nomogram - [facility] (12 Units/kg/hr × 100.2 kg (Adjusted) Intravenous New Bag 5/9/24 1727)   heparin 25,000 units in dextrose 5% (100 units/ml) IV bolus from bag LOW INTENSITY NOMOGRAM - [facility] (has no administration in time range)   heparin 25,000 units in dextrose 5% (100 units/ml) IV bolus from bag LOW INTENSITY NOMOGRAM - [facility] (has no administration in time range)   hydrALAZINE (APRESOLINE) 20 mg/mL injection (  Not Given 5/9/24 2045)   LORazepam (ATIVAN) 2 mg/mL injection (  Not Given 5/9/24 2047)   nitroGLYCERIN in 5 % dextrose 50 mg/250 mL (200 mcg/mL) infusion (5 mcg/min Intravenous New Bag 5/9/24 2232)   nitroGLYCERIN 2% TD oint ointment 1 inch (1 inch Topical (Top) Given 5/9/24 1628)   aspirin chewable tablet 243 mg (243 mg Oral Given 5/9/24 1628)   heparin 25,000 units in dextrose 5% (100 units/ml) IV bolus from bag LOW INTENSITY NOMOGRAM - [facility] (4,000 Units Intravenous Bolus from Bag 5/9/24 1726)   labetalol 20 mg/4 mL (5 mg/mL) IV syring (20 mg Intravenous Given 5/9/24 1952)   labetalol 20 mg/4 mL (5 mg/mL) IV syring (20 mg Intravenous Given 5/9/24 2011)   LORazepam injection 1 mg (1 mg Intravenous Given 5/9/24 2046)   hydrALAZINE injection 10 mg (10 mg Intravenous Given 5/9/24 2045)   hydrALAZINE injection 20 mg (20 mg Intravenous Given 5/9/24 2134)     Medical Decision  Making  Differential includes but not limited to chest wall pain, acute coronary syndrome, PE, pericarditis, myocarditis, pericardial tamponade, aortic dissection, pneumonia, pneumothorax, Boerhaave syndrome, anemia, electrolyte abnormalities, herpes zoster, pancreatitis, anxiety, chest wall strain, costochondritis      Workup includes labs, x-ray,   Rx Aspirin, nitro      Amount and/or Complexity of Data Reviewed  External Data Reviewed: labs and notes.  Labs: ordered. Decision-making details documented in ED Course.  Radiology: ordered and independent interpretation performed. Decision-making details documented in ED Course.  ECG/medicine tests: ordered and independent interpretation performed. Decision-making details documented in ED Course.  Discussion of management or test interpretation with external provider(s): Tele cardiology recommends heparin drip, transfer patient for angiogram tonight or tomorrow.    Patient is accepted in transfer by ER MD at [facility].  The patient refused to go to [facility].    Patient is accepted in transfer by Dr. Ramachandran at [facility] in [place].    Risk  OTC drugs.  Prescription drug management.  Decision regarding hospitalization.               ED Course as of 05/09/24 2235   Thu May 09, 2024   1611 EKG 4:04 p.m. normal sinus rhythm rate of 92.  Prolonged QT interval.  No ST elevation or depression.  No ischemia.  EKG interpreted independently by me.   [JR]      ED Course User Index  [JR] Saad Mcbride DO                           Clinical Impression:  Final diagnoses:  [R07.9] Chest pain  [I21.4] NSTEMI (non-ST elevated myocardial infarction) (Primary)          ED Disposition Condition    Transfer to Another Facility Stable                Kenny Blair MD  05/09/24 2006       Kenny Blair MD  05/09/24 2046       Kenny Blair MD  05/09/24 2057       Kenny Blair MD  05/09/24 2235

## 2024-05-10 LAB
OHS QRS DURATION: 106 MS
OHS QTC CALCULATION: 502 MS

## 2024-05-21 ENCOUNTER — EXTERNAL HOME HEALTH (OUTPATIENT)
Dept: HOME HEALTH SERVICES | Facility: HOSPITAL | Age: 64
End: 2024-05-21
Payer: MEDICARE

## 2024-06-13 ENCOUNTER — LAB REQUISITION (OUTPATIENT)
Dept: LAB | Facility: HOSPITAL | Age: 64
End: 2024-06-13
Payer: MEDICARE

## 2024-06-13 DIAGNOSIS — I12.9 HYPERTENSIVE CHRONIC KIDNEY DISEASE WITH STAGE 1 THROUGH STAGE 4 CHRONIC KIDNEY DISEASE, OR UNSPECIFIED CHRONIC KIDNEY DISEASE: ICD-10-CM

## 2024-06-13 DIAGNOSIS — I25.118 ATHEROSCLEROTIC HEART DISEASE OF NATIVE CORONARY ARTERY WITH OTHER FORMS OF ANGINA PECTORIS: ICD-10-CM

## 2024-06-13 DIAGNOSIS — E11.22 TYPE 2 DIABETES MELLITUS WITH DIABETIC CHRONIC KIDNEY DISEASE: ICD-10-CM

## 2024-06-13 DIAGNOSIS — D63.1 ANEMIA IN CHRONIC KIDNEY DISEASE (CODE): ICD-10-CM

## 2024-06-13 LAB
ALBUMIN SERPL-MCNC: 3.7 G/DL (ref 3.4–5)
ALBUMIN/GLOB SERPL: 1.2 RATIO
ALP SERPL-CCNC: 89 UNIT/L (ref 50–144)
ALT SERPL-CCNC: 20 UNIT/L (ref 1–45)
ANION GAP SERPL CALC-SCNC: 7 MEQ/L (ref 2–13)
AST SERPL-CCNC: 26 UNIT/L (ref 17–59)
BACTERIA #/AREA URNS AUTO: ABNORMAL /HPF
BILIRUB SERPL-MCNC: 0.3 MG/DL (ref 0–1)
BILIRUB UR QL STRIP.AUTO: NEGATIVE
BUN SERPL-MCNC: 35 MG/DL (ref 7–20)
CALCIUM SERPL-MCNC: 8.9 MG/DL (ref 8.4–10.2)
CHLORIDE SERPL-SCNC: 108 MMOL/L (ref 98–110)
CLARITY UR: CLEAR
CO2 SERPL-SCNC: 24 MMOL/L (ref 21–32)
COLOR UR AUTO: YELLOW
CREAT SERPL-MCNC: 3.01 MG/DL (ref 0.66–1.25)
CREAT/UREA NIT SERPL: 12 (ref 12–20)
CRP SERPL-MCNC: 0.7 MG/DL
GFR SERPLBLD CREATININE-BSD FMLA CKD-EPI: 22 ML/MIN/1.73/M2
GLOBULIN SER-MCNC: 3.2 GM/DL (ref 2–3.9)
GLUCOSE SERPL-MCNC: 167 MG/DL (ref 70–115)
GLUCOSE UR QL STRIP: NEGATIVE
HGB UR QL STRIP: NEGATIVE
KETONES UR QL STRIP: NEGATIVE
LEUKOCYTE ESTERASE UR QL STRIP: ABNORMAL
MAGNESIUM SERPL-MCNC: 2.5 MG/DL (ref 1.8–2.4)
NITRITE UR QL STRIP: NEGATIVE
PH UR STRIP: 7 [PH]
POTASSIUM SERPL-SCNC: 3.8 MMOL/L (ref 3.5–5.1)
PROT SERPL-MCNC: 6.9 GM/DL (ref 6.3–8.2)
PROT UR QL STRIP: ABNORMAL
RBC #/AREA URNS AUTO: ABNORMAL /HPF
SODIUM SERPL-SCNC: 139 MMOL/L (ref 136–145)
SP GR UR STRIP.AUTO: 1.01 (ref 1–1.03)
SQUAMOUS #/AREA URNS AUTO: ABNORMAL /HPF
UROBILINOGEN UR STRIP-ACNC: 0.2
WBC #/AREA URNS AUTO: ABNORMAL /HPF

## 2024-06-13 PROCEDURE — 81015 MICROSCOPIC EXAM OF URINE: CPT | Performed by: FAMILY MEDICINE

## 2024-06-13 PROCEDURE — 81003 URINALYSIS AUTO W/O SCOPE: CPT | Performed by: FAMILY MEDICINE

## 2024-06-13 PROCEDURE — 83735 ASSAY OF MAGNESIUM: CPT | Performed by: FAMILY MEDICINE

## 2024-06-13 PROCEDURE — 87086 URINE CULTURE/COLONY COUNT: CPT | Performed by: FAMILY MEDICINE

## 2024-06-13 PROCEDURE — 86140 C-REACTIVE PROTEIN: CPT | Performed by: FAMILY MEDICINE

## 2024-06-13 PROCEDURE — 80053 COMPREHEN METABOLIC PANEL: CPT | Performed by: FAMILY MEDICINE

## 2024-06-16 LAB — BACTERIA UR CULT: NO GROWTH

## 2024-08-12 PROBLEM — I21.4 NSTEMI (NON-ST ELEVATED MYOCARDIAL INFARCTION): Status: RESOLVED | Noted: 2024-05-09 | Resolved: 2024-08-12

## 2024-10-04 ENCOUNTER — LAB VISIT (OUTPATIENT)
Dept: LAB | Facility: HOSPITAL | Age: 64
End: 2024-10-04
Attending: INTERNAL MEDICINE
Payer: MEDICARE

## 2024-10-04 DIAGNOSIS — I73.9 PERIPHERAL VASCULAR DISEASE, UNSPECIFIED: ICD-10-CM

## 2024-10-04 DIAGNOSIS — N18.4 CHRONIC KIDNEY DISEASE, STAGE IV (SEVERE): Primary | ICD-10-CM

## 2024-10-04 DIAGNOSIS — I10 HYPERTENSION, UNSPECIFIED TYPE: ICD-10-CM

## 2024-10-04 DIAGNOSIS — E11.69 DIABETES MELLITUS ASSOCIATED WITH HORMONAL ETIOLOGY: ICD-10-CM

## 2024-10-04 DIAGNOSIS — I25.10 CORONARY ATHEROSCLEROSIS OF NATIVE CORONARY ARTERY: ICD-10-CM

## 2024-10-04 DIAGNOSIS — R31.9 HEMATURIA SYNDROME: ICD-10-CM

## 2024-10-04 LAB
ALBUMIN SERPL-MCNC: 3.4 G/DL (ref 3.4–5)
BASOPHILS # BLD AUTO: 0.01 X10(3)/MCL (ref 0.01–0.08)
BASOPHILS NFR BLD AUTO: 0.2 % (ref 0.1–1.2)
BILIRUB UR QL STRIP.AUTO: NEGATIVE
BUN SERPL-MCNC: 54 MG/DL (ref 7–20)
CALCIUM SERPL-MCNC: 9 MG/DL (ref 8.4–10.2)
CALCIUM SERPL-MCNC: 9 MG/DL (ref 8.4–10.2)
CHLORIDE SERPL-SCNC: 109 MMOL/L (ref 98–110)
CLARITY UR: CLEAR
CO2 SERPL-SCNC: 23 MMOL/L (ref 21–32)
COLOR UR AUTO: YELLOW
CREAT SERPL-MCNC: 3.33 MG/DL (ref 0.66–1.25)
CREAT UR-MCNC: 83 MG/DL (ref 63–166)
CREAT UR-MCNC: 93.7 MG/DL
CREAT/UREA NIT SERPL: 16 (ref 12–20)
EOSINOPHIL # BLD AUTO: 0.24 X10(3)/MCL (ref 0.04–0.54)
EOSINOPHIL NFR BLD AUTO: 3.8 % (ref 0.7–7)
ERYTHROCYTE [DISTWIDTH] IN BLOOD BY AUTOMATED COUNT: 21 %
GFR SERPLBLD CREATININE-BSD FMLA CKD-EPI: 20 ML/MIN/1.73/M2
GLUCOSE SERPL-MCNC: 173 MG/DL (ref 70–115)
GLUCOSE UR QL STRIP: NEGATIVE
HCT VFR BLD AUTO: 20.7 % (ref 36–52)
HGB BLD-MCNC: 6.1 G/DL (ref 13–18)
HGB UR QL STRIP: NEGATIVE
IMM GRANULOCYTES # BLD AUTO: 0.02 X10(3)/MCL (ref 0–0.03)
IMM GRANULOCYTES NFR BLD AUTO: 0.3 % (ref 0–0.5)
KETONES UR QL STRIP: NEGATIVE
LEUKOCYTE ESTERASE UR QL STRIP: NEGATIVE
LYMPHOCYTES # BLD AUTO: 1.36 X10(3)/MCL (ref 1.32–3.57)
LYMPHOCYTES NFR BLD AUTO: 21.5 % (ref 20–55)
MCH RBC QN AUTO: 24.7 PG (ref 27–34)
MCHC RBC AUTO-ENTMCNC: 29.5 G/DL (ref 31–37)
MCV RBC AUTO: 83.8 FL (ref 79–99)
MICROALBUMIN UR-MCNC: <5 UG/ML
MICROALBUMIN/CREAT RATIO PNL UR: NORMAL
MONOCYTES # BLD AUTO: 0.46 X10(3)/MCL (ref 0.3–0.82)
MONOCYTES NFR BLD AUTO: 7.3 % (ref 4.7–12.5)
NEUTROPHILS # BLD AUTO: 4.25 X10(3)/MCL (ref 1.78–5.38)
NEUTROPHILS NFR BLD AUTO: 66.9 % (ref 37–73)
NITRITE UR QL STRIP: NEGATIVE
NRBC BLD AUTO-RTO: 0 %
PH UR STRIP: 6 [PH]
PHOSPHATE SERPL-MCNC: 3.6 MG/DL (ref 2.5–4.9)
PLATELET # BLD AUTO: 176 X10(3)/MCL (ref 140–371)
PMV BLD AUTO: 10.6 FL (ref 9.4–12.4)
POTASSIUM SERPL-SCNC: 4.2 MMOL/L (ref 3.5–5.1)
PROT UR QL STRIP: NEGATIVE
PROT UR STRIP-MCNC: 8 MG/DL
PTH-INTACT SERPL-MCNC: 153 PG/ML (ref 14–73)
RBC # BLD AUTO: 2.47 X10(6)/MCL (ref 4–6)
SODIUM SERPL-SCNC: 140 MMOL/L (ref 136–145)
SP GR UR STRIP.AUTO: 1.01 (ref 1–1.03)
UROBILINOGEN UR STRIP-ACNC: 0.2
WBC # BLD AUTO: 6.34 X10(3)/MCL (ref 4–11.5)

## 2024-10-04 PROCEDURE — 85025 COMPLETE CBC W/AUTO DIFF WBC: CPT

## 2024-10-04 PROCEDURE — 81003 URINALYSIS AUTO W/O SCOPE: CPT

## 2024-10-04 PROCEDURE — 84156 ASSAY OF PROTEIN URINE: CPT

## 2024-10-04 PROCEDURE — 80069 RENAL FUNCTION PANEL: CPT

## 2024-10-04 PROCEDURE — 83970 ASSAY OF PARATHORMONE: CPT

## 2024-10-04 PROCEDURE — 36415 COLL VENOUS BLD VENIPUNCTURE: CPT

## 2024-10-04 PROCEDURE — 82570 ASSAY OF URINE CREATININE: CPT

## 2024-10-04 PROCEDURE — 82043 UR ALBUMIN QUANTITATIVE: CPT

## 2024-11-05 ENCOUNTER — HOSPITAL ENCOUNTER (OUTPATIENT)
Dept: RADIOLOGY | Facility: HOSPITAL | Age: 64
Discharge: HOME | End: 2024-11-05
Attending: INTERNAL MEDICINE
Payer: MEDICARE

## 2024-11-05 DIAGNOSIS — I70.203: ICD-10-CM

## 2025-04-23 ENCOUNTER — LAB VISIT (OUTPATIENT)
Dept: LAB | Facility: HOSPITAL | Age: 65
End: 2025-04-23
Attending: INTERNAL MEDICINE
Payer: MEDICARE

## 2025-04-23 DIAGNOSIS — I25.10 CORONARY ATHEROSCLEROSIS OF NATIVE CORONARY ARTERY: ICD-10-CM

## 2025-04-23 DIAGNOSIS — N18.4 CHRONIC KIDNEY DISEASE, STAGE IV (SEVERE): Primary | ICD-10-CM

## 2025-04-23 DIAGNOSIS — E11.22 TYPE 2 DIABETES MELLITUS WITH DIABETIC CHRONIC KIDNEY DISEASE: ICD-10-CM

## 2025-04-23 DIAGNOSIS — I25.10 CORONARY ARTERY DISEASE INVOLVING NATIVE CORONARY ARTERY OF NATIVE HEART, UNSPECIFIED WHETHER ANGINA PRESENT: ICD-10-CM

## 2025-04-23 DIAGNOSIS — I10 HYPERTENSION, UNSPECIFIED TYPE: ICD-10-CM

## 2025-04-23 DIAGNOSIS — I73.9 PERIPHERAL VASCULAR DISEASE, UNSPECIFIED: ICD-10-CM

## 2025-04-23 DIAGNOSIS — D63.1 ANEMIA IN CHRONIC KIDNEY DISEASE (CODE): ICD-10-CM

## 2025-04-23 DIAGNOSIS — E11.69 DIABETES MELLITUS ASSOCIATED WITH HORMONAL ETIOLOGY: ICD-10-CM

## 2025-04-23 DIAGNOSIS — I25.118 ATHEROSCLEROTIC HEART DISEASE OF NATIVE CORONARY ARTERY WITH OTHER FORMS OF ANGINA PECTORIS: ICD-10-CM

## 2025-04-23 DIAGNOSIS — I12.9 HYPERTENSIVE CHRONIC KIDNEY DISEASE WITH STAGE 1 THROUGH STAGE 4 CHRONIC KIDNEY DISEASE, OR UNSPECIFIED CHRONIC KIDNEY DISEASE: ICD-10-CM

## 2025-04-23 DIAGNOSIS — R31.9 HEMATURIA SYNDROME: ICD-10-CM

## 2025-04-23 LAB
25(OH)D3+25(OH)D2 SERPL-MCNC: 8 NG/ML (ref 30–80)
ALBUMIN SERPL-MCNC: 3.9 G/DL (ref 3.4–5)
BASOPHILS # BLD AUTO: 0.01 X10(3)/MCL (ref 0.01–0.08)
BASOPHILS NFR BLD AUTO: 0.2 % (ref 0.1–1.2)
BILIRUB UR QL STRIP.AUTO: NEGATIVE
BUN SERPL-MCNC: 35 MG/DL (ref 7–20)
CALCIUM SERPL-MCNC: 9.2 MG/DL (ref 8.4–10.2)
CALCIUM SERPL-MCNC: 9.2 MG/DL (ref 8.4–10.2)
CHLORIDE SERPL-SCNC: 108 MMOL/L (ref 98–110)
CLARITY UR: CLEAR
CO2 SERPL-SCNC: 26 MMOL/L (ref 21–32)
COLOR UR AUTO: YELLOW
CREAT SERPL-MCNC: 3.05 MG/DL (ref 0.66–1.25)
CREAT UR-MCNC: 43.4 MG/DL (ref 63–166)
CREAT UR-MCNC: 45.5 MG/DL
CREAT/UREA NIT SERPL: 11 (ref 12–20)
EOSINOPHIL # BLD AUTO: 0.29 X10(3)/MCL (ref 0.04–0.54)
EOSINOPHIL NFR BLD AUTO: 5.3 % (ref 0.7–7)
ERYTHROCYTE [DISTWIDTH] IN BLOOD BY AUTOMATED COUNT: 19.2 %
FERRITIN SERPL-MCNC: 13.3 NG/ML (ref 17.9–464)
GFR SERPLBLD CREATININE-BSD FMLA CKD-EPI: 22 ML/MIN/1.73/M2
GLUCOSE SERPL-MCNC: 102 MG/DL (ref 70–115)
GLUCOSE UR QL STRIP: NEGATIVE
HCT VFR BLD AUTO: 34.8 % (ref 36–52)
HGB BLD-MCNC: 10.4 G/DL (ref 13–18)
HGB UR QL STRIP: NEGATIVE
IMM GRANULOCYTES # BLD AUTO: 0.01 X10(3)/MCL (ref 0–0.03)
IMM GRANULOCYTES NFR BLD AUTO: 0.2 % (ref 0–0.5)
IRON SATN MFR SERPL: 10 % (ref 20–50)
IRON SERPL-MCNC: 30 UG/DL (ref 65–175)
KETONES UR QL STRIP: NEGATIVE
LEUKOCYTE ESTERASE UR QL STRIP: NEGATIVE
LYMPHOCYTES # BLD AUTO: 1.28 X10(3)/MCL (ref 1.32–3.57)
LYMPHOCYTES NFR BLD AUTO: 23.5 % (ref 20–55)
MCH RBC QN AUTO: 25.6 PG (ref 27–34)
MCHC RBC AUTO-ENTMCNC: 29.9 G/DL (ref 31–37)
MCV RBC AUTO: 85.7 FL (ref 79–99)
MICROALBUMIN UR-MCNC: 33.8 UG/ML
MICROALBUMIN/CREAT RATIO PNL UR: 77.9 MG/GM CR (ref 0–30)
MONOCYTES # BLD AUTO: 0.38 X10(3)/MCL (ref 0.3–0.82)
MONOCYTES NFR BLD AUTO: 7 % (ref 4.7–12.5)
NEUTROPHILS # BLD AUTO: 3.48 X10(3)/MCL (ref 1.78–5.38)
NEUTROPHILS NFR BLD AUTO: 63.8 % (ref 37–73)
NITRITE UR QL STRIP: NEGATIVE
NRBC BLD AUTO-RTO: 0 %
PH UR STRIP: 6 [PH]
PHOSPHATE SERPL-MCNC: 3.6 MG/DL (ref 2.5–4.9)
PLATELET # BLD AUTO: 217 X10(3)/MCL (ref 140–371)
PMV BLD AUTO: 10.2 FL (ref 9.4–12.4)
POTASSIUM SERPL-SCNC: 3.9 MMOL/L (ref 3.5–5.1)
PROT UR QL STRIP: NEGATIVE
PROT UR STRIP-MCNC: 15 MG/DL
PTH-INTACT SERPL-MCNC: 167.5 PG/ML (ref 14–73)
RBC # BLD AUTO: 4.06 X10(6)/MCL (ref 4–6)
SODIUM SERPL-SCNC: 139 MMOL/L (ref 136–145)
SP GR UR STRIP.AUTO: 1.01 (ref 1–1.03)
TIBC SERPL-MCNC: 267 UG/DL (ref 60–240)
TIBC SERPL-MCNC: 297 UG/DL (ref 250–450)
TRANSFERRIN SERPL-MCNC: 278 MG/DL (ref 163–344)
URINE PROTEIN/CREATININE RATIO (OLG): 0.3
UROBILINOGEN UR STRIP-ACNC: 0.2
WBC # BLD AUTO: 5.45 X10(3)/MCL (ref 4–11.5)

## 2025-04-23 PROCEDURE — 83540 ASSAY OF IRON: CPT

## 2025-04-23 PROCEDURE — 82306 VITAMIN D 25 HYDROXY: CPT

## 2025-04-23 PROCEDURE — 81003 URINALYSIS AUTO W/O SCOPE: CPT

## 2025-04-23 PROCEDURE — 82570 ASSAY OF URINE CREATININE: CPT

## 2025-04-23 PROCEDURE — 36415 COLL VENOUS BLD VENIPUNCTURE: CPT

## 2025-04-23 PROCEDURE — 85025 COMPLETE CBC W/AUTO DIFF WBC: CPT

## 2025-04-23 PROCEDURE — 83970 ASSAY OF PARATHORMONE: CPT

## 2025-04-23 PROCEDURE — 82652 VIT D 1 25-DIHYDROXY: CPT

## 2025-04-23 PROCEDURE — 80069 RENAL FUNCTION PANEL: CPT

## 2025-04-23 PROCEDURE — 82570 ASSAY OF URINE CREATININE: CPT | Mod: 59

## 2025-04-23 PROCEDURE — 82728 ASSAY OF FERRITIN: CPT

## 2025-04-28 LAB — 1,25(OH)2D SERPL-MCNC: 29 PG/ML (ref 18–64)

## 2025-05-23 ENCOUNTER — HOSPITAL ENCOUNTER (EMERGENCY)
Facility: HOSPITAL | Age: 65
Discharge: HOME | End: 2025-05-23
Attending: EMERGENCY MEDICINE
Payer: MEDICARE

## 2025-05-23 VITALS
DIASTOLIC BLOOD PRESSURE: 80 MMHG | WEIGHT: 300 LBS | OXYGEN SATURATION: 97 % | RESPIRATION RATE: 16 BRPM | HEART RATE: 76 BPM | BODY MASS INDEX: 37.3 KG/M2 | SYSTOLIC BLOOD PRESSURE: 172 MMHG | TEMPERATURE: 98 F | HEIGHT: 75 IN

## 2025-05-23 DIAGNOSIS — R07.9 CHEST PAIN: ICD-10-CM

## 2025-05-23 DIAGNOSIS — R07.9 CHEST PAIN, UNSPECIFIED TYPE: Primary | ICD-10-CM

## 2025-05-23 LAB
ALBUMIN SERPL-MCNC: 3.7 G/DL (ref 3.4–5)
ALBUMIN/GLOB SERPL: 0.9 RATIO
ALP SERPL-CCNC: 99 UNIT/L (ref 50–144)
ALT SERPL-CCNC: 12 UNIT/L (ref 1–45)
ANION GAP SERPL CALC-SCNC: 1 MEQ/L (ref 2–13)
AST SERPL-CCNC: 19 UNIT/L (ref 17–59)
BASOPHILS # BLD AUTO: 0.02 X10(3)/MCL (ref 0.01–0.08)
BASOPHILS NFR BLD AUTO: 0.4 % (ref 0.1–1.2)
BILIRUB SERPL-MCNC: 0.5 MG/DL (ref 0–1)
BNP BLD-MCNC: 642 PG/ML (ref 0–124.9)
BUN SERPL-MCNC: 32 MG/DL (ref 7–20)
CALCIUM SERPL-MCNC: 9.3 MG/DL (ref 8.4–10.2)
CHLORIDE SERPL-SCNC: 109 MMOL/L (ref 98–110)
CO2 SERPL-SCNC: 28 MMOL/L (ref 21–32)
CREAT SERPL-MCNC: 3.05 MG/DL (ref 0.66–1.25)
CREAT/UREA NIT SERPL: 10 (ref 12–20)
EOSINOPHIL # BLD AUTO: 0.29 X10(3)/MCL (ref 0.04–0.54)
EOSINOPHIL NFR BLD AUTO: 5.9 % (ref 0.7–7)
ERYTHROCYTE [DISTWIDTH] IN BLOOD BY AUTOMATED COUNT: 17.1 %
GFR SERPLBLD CREATININE-BSD FMLA CKD-EPI: 22 ML/MIN/1.73/M2
GLOBULIN SER-MCNC: 4.1 GM/DL (ref 2–3.9)
GLUCOSE SERPL-MCNC: 131 MG/DL (ref 70–115)
HCT VFR BLD AUTO: 35 % (ref 36–52)
HGB BLD-MCNC: 10.9 G/DL (ref 13–18)
IMM GRANULOCYTES # BLD AUTO: 0.01 X10(3)/MCL (ref 0–0.03)
IMM GRANULOCYTES NFR BLD AUTO: 0.2 % (ref 0–0.5)
INR PPP: 1
LIPASE SERPL-CCNC: 118 U/L (ref 23–300)
LYMPHOCYTES # BLD AUTO: 1.06 X10(3)/MCL (ref 1.32–3.57)
LYMPHOCYTES NFR BLD AUTO: 21.5 % (ref 20–55)
MCH RBC QN AUTO: 26.7 PG (ref 27–34)
MCHC RBC AUTO-ENTMCNC: 31.1 G/DL (ref 31–37)
MCV RBC AUTO: 85.6 FL (ref 79–99)
MONOCYTES # BLD AUTO: 0.36 X10(3)/MCL (ref 0.3–0.82)
MONOCYTES NFR BLD AUTO: 7.3 % (ref 4.7–12.5)
NEUTROPHILS # BLD AUTO: 3.19 X10(3)/MCL (ref 1.78–5.38)
NEUTROPHILS NFR BLD AUTO: 64.7 % (ref 37–73)
NRBC BLD AUTO-RTO: 0 %
PLATELET # BLD AUTO: 208 X10(3)/MCL (ref 140–371)
PMV BLD AUTO: 10.2 FL (ref 9.4–12.4)
POTASSIUM SERPL-SCNC: 3.6 MMOL/L (ref 3.5–5.1)
PROT SERPL-MCNC: 7.8 GM/DL (ref 6.3–8.2)
PROTHROMBIN TIME: 10.3 SECONDS (ref 9.3–11.9)
RBC # BLD AUTO: 4.09 X10(6)/MCL (ref 4–6)
SODIUM SERPL-SCNC: 138 MMOL/L (ref 136–145)
TROPONIN I SERPL-MCNC: 0.02 NG/ML (ref 0–0.03)
TROPONIN I SERPL-MCNC: 0.02 NG/ML (ref 0–0.03)
WBC # BLD AUTO: 4.93 X10(3)/MCL (ref 4–11.5)

## 2025-05-23 PROCEDURE — 80053 COMPREHEN METABOLIC PANEL: CPT | Performed by: EMERGENCY MEDICINE

## 2025-05-23 PROCEDURE — 85025 COMPLETE CBC W/AUTO DIFF WBC: CPT | Performed by: EMERGENCY MEDICINE

## 2025-05-23 PROCEDURE — 84484 ASSAY OF TROPONIN QUANT: CPT | Performed by: EMERGENCY MEDICINE

## 2025-05-23 PROCEDURE — 93005 ELECTROCARDIOGRAM TRACING: CPT

## 2025-05-23 PROCEDURE — 93010 ELECTROCARDIOGRAM REPORT: CPT | Mod: ,,, | Performed by: INTERNAL MEDICINE

## 2025-05-23 PROCEDURE — 36415 COLL VENOUS BLD VENIPUNCTURE: CPT | Performed by: EMERGENCY MEDICINE

## 2025-05-23 PROCEDURE — 85610 PROTHROMBIN TIME: CPT | Performed by: EMERGENCY MEDICINE

## 2025-05-23 PROCEDURE — 83880 ASSAY OF NATRIURETIC PEPTIDE: CPT | Performed by: EMERGENCY MEDICINE

## 2025-05-23 PROCEDURE — 83690 ASSAY OF LIPASE: CPT | Performed by: EMERGENCY MEDICINE

## 2025-05-23 PROCEDURE — 99285 EMERGENCY DEPT VISIT HI MDM: CPT | Mod: 25

## 2025-05-23 RX ORDER — PANTOPRAZOLE SODIUM 40 MG/1
40 TABLET, DELAYED RELEASE ORAL NIGHTLY
COMMUNITY
Start: 2025-03-24

## 2025-05-23 NOTE — ED PROVIDER NOTES
Chief Compliant    Chief Complaint   Patient presents with    Chest Pain     PT to ER via [facility], PT C/O chest pain starting this am. HX of CABG and MI. PT took two nitros at home, and received 324 of aspirin pta.           History of Present Illness    This is a pleasant 64-year-old  man past medical history significant for morbid obesity, coronary artery disease status post CABG in February of 2024, hypertension, hyperlipidemia, peripheral arterial disease, CKD with a baseline creatinine of approximately 3.0, GERD who presents to the emergency department by EMS with a chief complaint of chest pain.  He reports he has been in his normal state of health when he woke up this morning and had what he describes as heartburn symptoms with some burning and pressure in his epigastrium and chest with mild water brash.  No shortness of breath or palpitations or sharp chest pain or back pain.  No cough or fevers or chills.  He reports that he really almost never gets chest pain and did not have chest pain prior to his CABG but that it was I guess incidentally found that he had multivessel disease.  He occasionally gets heartburn but not very frequently.  He tried some sublingual nitroglycerin and the symptoms did not really go away so EMS was called.  He reports that since that time his symptoms have resolved completely.  EMS gave p.o. aspirin.  Reports compliance with his medication and denies any tobacco or alcohol or drug use.                               Review of Systems    All 10 systems reviewed and negative except as marked.    GEN: No fever/chills  HEENT: No sore throat or ear aches.  CV:  See HPI  RESP: No cough or shortness of breath  GI:  See HPI  : No dysuria or urinary frequency.  NEURO: No focal weakness, numbness/tingling or dizziness, speech or visual changes.  SKIN: No redness, swelling, burises or rash.  MSK: No joint pain or swelling, no injrueis or deformities.  ENDO: No  "polyuria, polydypsia.  PSYCH: No depression or anxiety.       Physical Exam  Physical Exam  Vitals and nursing note reviewed.        BP (!) 177/78 (BP Location: Right arm)   Pulse 75   Temp 98 °F (36.7 °C) (Oral)   Resp 16   Ht 6' 3" (1.905 m)   Wt 300 lb (136.1 kg)   SpO2 98%   BMI 37.50 kg/m²   VITALS: Reviewed Triage Vitals and Updated Vitals myself  GEN:  Morbidly obese middle-aged  man sitting upright in the ER stretcher, in no apparent distress, pleasant cooperative with exam.  HEENT: EOMI, mmm   CV: RRR, no murmurs, rubs, gallops. No LE pitting edema. Chest wall nontender.  Well-healed midline surgical incision without any breakdown or erythema or warmth or tenderness to palpation.  LUNG: CTAB, no wheezes, rhales, or rhonchi. Nonlabored, no accessory muscle use. Speaking in full sentences.  ABD: Soft, nondistented, normal bowel sounds. No TTP, no rebound, no guarding, no pulsatile masses.   NEURO: Alert, no obvious facial asymmetry, moving all extremities  MSK: FROM o fall extremities, no obivous deformities  SKIN: No obvious rashes/bruises.  PSYCH: Normal mood/affect.            Medical and Surgical History    Past Medical History:   Diagnosis Date    Abnormal cardiovascular stress test     Acute anemia     Angiectasia     Angina pectoris     Arthritis     Atherosclerosis of native arteries of extremities with intermittent claudication, unspecified extremity     Back pain     Coronary artery disease     Coronary artery disease, unspecified vessel or lesion type, unspecified whether angina present, unspecified whether native or transplanted heart     Diabetes mellitus     Heart disease     Hypertension     Obesity     Peripheral artery disease     Renal disorder     kidney disease Stage IV    SOB (shortness of breath)     Weakness of both legs          Past Surgical History:   Procedure Laterality Date    COLONOSCOPY      CORONARY ANGIOGRAPHY Right 01/04/2024    Procedure: ANGIOGRAM, " "CORONARY ARTERY;  Surgeon: Ruben Bee MD;  Location: [facility] CATH LAB;  Service: Cardiology;  Laterality: Right;    CORONARY ARTERY BYPASS GRAFT (CABG) N/A 2024    Procedure: CORONARY ARTERY BYPASS GRAFT (CABG);  Surgeon: Silvana Bauman MD;  Location: [facility] OR;  Service: Cardiovascular;  Laterality: N/A;    ENDARTERECTOMY N/A 2024    Procedure: ENDARTERECTOMY;  Surgeon: Silvana Bauman MD;  Location: [facility] OR;  Service: Cardiovascular;  Laterality: N/A;  CORONARY ENDARTERECTOMY    ENDOSCOPIC HARVEST OF VEIN N/A 2024    Procedure: SURGICAL PROCUREMENT, VEIN, ENDOSCOPIC;  Surgeon: Silvana Bauman MD;  Location: [facility] OR;  Service: Cardiovascular;  Laterality: N/A;    EXCLUSION OF LEFT ATRIAL APPENDAGE N/A 2024    Procedure: EXCLUSION, LEFT ATRIAL APPENDAGE;  Surgeon: Silvana Bauman MD;  Location: [facility] OR;  Service: Cardiovascular;  Laterality: N/A;    LEFT HEART CATHETERIZATION Right 2024    Procedure: Left heart cath;  Surgeon: Ruben Bee MD;  Location: [facility] CATH LAB;  Service: Cardiology;  Laterality: Right;  DIAGNOSTIC PERIPHERAL    PERIPHERAL ANGIOGRAPHY      PLACEMENT-STENT      pt states "in one of my legs"    UPPER GASTROINTESTINAL ENDOSCOPY             Family History    No family history on file.        Social History    Social History     Socioeconomic History    Marital status: Single   Tobacco Use    Smoking status: Some Days     Current packs/day: 0.00     Types: Cigarettes, Vaping with nicotine, Vaping w/o nicotine     Last attempt to quit: 2021     Years since quittin.3    Smokeless tobacco: Never    Tobacco comments:     Quit smoking 2 years ago-only vapes some days    Substance and Sexual Activity    Alcohol use: Never    Drug use: Never    Sexual activity: Not Currently     Partners: Female     Birth control/protection: None     Social Drivers of Health     Financial Resource Strain: High Risk (10/9/2024)    Received from [facility] SDOH " Screening     In the past year, have you been unable to get any of the following when you really needed them? choose all that apply.: Clothing   Food Insecurity: High Risk (10/9/2024)    Received from [facility] SDOH Screening     In the past 2 months, did you or others you live with eat smaller meals or skip meals because you didn't have money for food?: Yes   Transportation Needs: Unknown (2/23/2024)    PRAPARE - Transportation     Lack of Transportation (Medical): No   Physical Activity: Inactive (6/1/2023)    Exercise Vital Sign     Days of Exercise per Week: 0 days     Minutes of Exercise per Session: 0 min   Stress: No Stress Concern Present (6/1/2023)    [employer] - Occupational Stress Questionnaire     Feeling of Stress : Not at all   Housing Stability: High Risk (10/9/2024)    Received from [facility] SDOH Screening     In the past year, have you been unable to get any of the following when you really needed them? choose all that apply.: Utilities (electric, gas, and water)                 Allergies    Review of patient's allergies indicates:   Allergen Reactions    Allergenic extract-food-shrimp Rash     All seafood      Lactose Rash    Mayonnaise Rash         Current Medications    Current Medications[1]                      Radiology, Labs, Miscellaneous Studies    Imaging Results              X-Ray Chest AP Portable (Final result)  Result time 05/23/25 12:24:53      Final result by Deepthi June III, MD (05/23/25 12:24:53)                   Impression:      1. I see no lobar or segmental infiltrates or other significant abnormalities.      Electronically signed by: Deepthi June  Date:    05/23/2025  Time:    12:24               Narrative:    EXAMINATION:  STUDY: XR CHEST AP PORTABLE    CLINICAL HISTORY AND TECHNIQUE:  Chest pain    COMPARISON:  05/09/2024    FINDINGS:  Postoperative changes indicated previous median sternotomy.  The lungs are  slightly under expanded.The cardiac, hilar, and mediastinal contours appear unremarkable.I see no lobar or segmental infiltrates.No significant pleural effusions are noted.No significant musculoskeletal or vascular abnormalities are appreciated.                                             Labs    Labs Reviewed   CBC WITH DIFFERENTIAL - Abnormal       Result Value    WBC 4.93      RBC 4.09      Hgb 10.9 (*)     Hct 35.0 (*)     MCV 85.6      MCH 26.7 (*)     MCHC 31.1      RDW 17.1      Platelet 208      MPV 10.2      Neut % 64.7      Lymph % 21.5      Mono % 7.3      Eos % 5.9      Basophil % 0.4      Lymph # 1.06 (*)     Neut # 3.19      Mono # 0.36      Eos # 0.29      Baso # 0.02      Imm Gran # 0.01      Imm Grans % 0.2      NRBC% 0.0     COMPREHENSIVE METABOLIC PANEL - Abnormal    Sodium 138      Potassium 3.6      Chloride 109      CO2 28      Glucose 131 (*)     Blood Urea Nitrogen 32 (*)     Creatinine 3.05 (*)     Calcium 9.3      Protein Total 7.8      Albumin 3.7      Globulin 4.1 (*)     Albumin/Globulin Ratio 0.9      Bilirubin Total 0.5      ALP 99      ALT 12      AST 19      eGFR 22      Anion Gap 1.0 (*)     BUN/Creatinine Ratio 10 (*)    NT-PRO NATRIURETIC PEPTIDE - Abnormal    ProBNP 642.0 (*)    PROTIME-INR - Normal    PT 10.3      INR 1.0      Narrative:     Protimes are used to monitor anticoagulant agents such as warfarin. PT INR values are based on the current patient normal mean and the ANTONIO value for the specific instrument reagent used.  **Routine theraputic target values for the INR are 2.0-3.0**   TROPONIN I - Normal    Troponin-I 0.022     TROPONIN I - Normal    Troponin-I 0.021     LIPASE - Normal    Lipase Level 118     CBC W/ AUTO DIFFERENTIAL    Narrative:     The following orders were created for panel order CBC auto differential.                  Procedure                               Abnormality         Status                                     ---------                           "     -----------         ------                                     CBC with Differential[[id number]]       Abnormal            Final result                                                 Please view results for these tests on the individual orders.            Future Appointments    No future appointments.                         Medical Decision Making  Amount and/or Complexity of Data Reviewed  Labs: ordered.  Radiology: ordered.        -I reviewed available prior medical records in the EPIC Electronic Medical Record and any paper documents brought by patient, family, EMS staff and any others available to me.     -Pertinent details are summarized in this document    -All laboratory, radiologic, and EKG studies that were performed in the Emergency Department were a necessary part of the evaluation needed to exclude unstable or  emergent medical conditions. Each of the unique tests during this ER encounter were ordered by myself for workup of this patient. I also reviewed all of the results.     -Patient's prescriptions were reviewed and changes documented.              "Medical Decision Making" for this ER Encounter:            Unsure of exact cause at this time, but thinking sounds like GERD but patient is high risk for other diagnosis based on medical history.  Ddx includes STEMI, NSTEMI, Angina. No cough/fever or focal lung findings to suggest pneumonia. No tachycardia, hypoxia or pleuritic component ot suggest Pulmonary Embolus.  . No extremely elevated BP/asymmetry or classic tearing sensation to suggest Aortic pathology. No retching/forceful vomiting to suggest esophageal disaster.    -Cardiac Monitor  -Serial EKGs  -IV Access  -Labs: CBC, CMP, Coags, Serial Cardiac Enzymes  -Lipase  -Meds:  P.o. aspirin received in the field.  No symptoms currently.  Low threshold for more medications as needed.  -Imaging: Chest Xray  -Re-assess            ED Course, Updates and Disposition:     ED Course as of 05/23/25 " 1555   Fri May 23, 2025   1402 CBC:  Benign    CMP:  Creatinine 3.05 which is at the patient's baseline.      Lipase normal at 118     BNP is elevated at 642.  This is actually 1 of the lower readings that the patient has had.  Last 1 was a year ago and was 1900 and before that it was nearly 5000    Troponin: 0.021    Coags: Benign     Chest x-ray: No acute process     Time is 12:16 p.m..  Rate is 80 beats per minute.  This is a normal sinus rhythm with incomplete right bundle branch block morphology with a QRS duration of 92 milliseconds.  There are some small Q-waves in leads 2 and 3 and AVF.  I do not see any obvious ST or T-wave abnormalities to suggest acute ischemia at this time.  No STEMI.  There is no significant change when compared to the last EKG on record it May 9, 2024.      Uptake:   -the patient remains asymptomatic   -we will follow up on 2 hour troponin and if significantly unchanged plan to discharge home with close outpatient follow-up. [ZH]   1553 Second troponin basically flat at 0.022.  Original was 0.0 2 1.    Remains asymptomatic   Discharged home   Continue medications as before   Patient is to call his primary care physician and cardiologist on Monday to schedule follow up appointment   -return to the ER for any problems or concerns [ZH]      ED Course User Index  [ZH] Melchor Sharma MD                                 In the ED, the patient received the following MEDICATIONS:    Medications - No data to display                                                    [1] No current facility-administered medications for this encounter.    Current Outpatient Medications:     acyclovir (ZOVIRAX) 400 MG tablet, Take 400 mg by mouth as needed., Disp: , Rfl:     aspirin (ECOTRIN) 81 MG EC tablet, Take 81 mg by mouth once daily., Disp: , Rfl:     calcitRIOL (ROCALTROL) 0.5 MCG Cap, Take 0.5 mcg by mouth 4 (four) times a week., Disp: , Rfl:     clopidogreL (PLAVIX) 75 mg tablet, Take 75 mg by mouth  once daily., Disp: , Rfl:     furosemide (LASIX) 40 MG tablet, Take 1 tablet (40 mg total) by mouth once daily. (Patient taking differently: Take 40 mg by mouth daily as needed (swelling).), Disp: 30 tablet, Rfl: 00    gabapentin (NEURONTIN) 300 MG capsule, Take 300 mg by mouth 2 (two) times daily., Disp: , Rfl:     glimepiride (AMARYL) 4 MG tablet, Take 4 mg by mouth once daily., Disp: , Rfl:     metoprolol succinate (TOPROL-XL) 100 MG 24 hr tablet, Take 100 mg by mouth once daily., Disp: , Rfl:     nitroGLYCERIN (NITROSTAT) 0.4 MG SL tablet, Place under the tongue., Disp: , Rfl:     pantoprazole (PROTONIX) 40 MG tablet, Take 40 mg by mouth every evening., Disp: , Rfl:     ranolazine (RANEXA) 500 MG Tb12, Take 1 tablet (500 mg total) by mouth 2 (two) times daily., Disp: 60 tablet, Rfl: 11    sildenafiL (VIAGRA) 100 MG tablet, Take 100 mg by mouth daily as needed for Erectile Dysfunction., Disp: , Rfl:     sodium bicarbonate 650 MG tablet, Take 650 mg by mouth 2 (two) times daily., Disp: , Rfl:     atorvastatin (LIPITOR) 80 MG tablet, Take 1 tablet (80 mg total) by mouth once daily., Disp: 90 tablet, Rfl: 3    cilostazoL (PLETAL) 50 MG Tab, Take 50 mg by mouth 2 (two) times daily., Disp: , Rfl:     HYDROcodone-acetaminophen (NORCO) 7.5-325 mg per tablet, TAKE 1 TABLET BY MOUTH EVERY 8 HOURS AS NEEDED FOR SEVERE LLE PAIN, Disp: , Rfl:     KLOR-CON 10 10 mEq TbSR, Take 10 mEq by mouth., Disp: , Rfl:     Facility-Administered Medications Ordered in Other Encounters:     aspirin tablet 325 mg, 325 mg, Oral, On Call Procedure, Order, Paper, 325 mg at 01/04/24 0949    diazePAM tablet 10 mg, 10 mg, Oral, On Call Procedure, Order, Paper, 10 mg at 01/04/24 0949       Melchor Sharma MD  05/23/25 0381

## 2025-05-23 NOTE — Clinical Note
"Rustam Callaway" Cassie was seen and treated in our emergency department on 5/23/2025.  He may return to work on 05/28/2025.  May return to work sooner if feeling better     If you have any questions or concerns, please don't hesitate to call.      Melchor Sharma MD"

## 2025-08-13 ENCOUNTER — LAB VISIT (OUTPATIENT)
Dept: LAB | Facility: HOSPITAL | Age: 65
End: 2025-08-13
Attending: INTERNAL MEDICINE
Payer: MEDICARE

## 2025-08-13 DIAGNOSIS — I25.10 CORONARY ATHEROSCLEROSIS OF NATIVE CORONARY ARTERY: ICD-10-CM

## 2025-08-13 DIAGNOSIS — R31.9 HEMATURIA SYNDROME: ICD-10-CM

## 2025-08-13 DIAGNOSIS — I10 HYPERTENSION, UNSPECIFIED TYPE: ICD-10-CM

## 2025-08-13 DIAGNOSIS — E11.69 DIABETES MELLITUS ASSOCIATED WITH HORMONAL ETIOLOGY: ICD-10-CM

## 2025-08-13 DIAGNOSIS — N18.4 CHRONIC KIDNEY DISEASE, STAGE IV (SEVERE): Primary | ICD-10-CM

## 2025-08-13 LAB
25(OH)D3+25(OH)D2 SERPL-MCNC: 20 NG/ML (ref 30–80)
ALBUMIN SERPL-MCNC: 3.2 G/DL (ref 3.4–4.8)
ALBUMIN/CREAT UR: 21.5 MG/GM CR (ref 0–30)
BASOPHILS # BLD AUTO: 0.02 X10(3)/MCL (ref 0.01–0.08)
BASOPHILS NFR BLD AUTO: 0.3 % (ref 0.1–1.2)
BILIRUB UR QL STRIP.AUTO: NEGATIVE
BUN SERPL-MCNC: 33 MG/DL (ref 8.4–25.7)
CALCIUM SERPL-MCNC: 8.6 MG/DL (ref 8.8–10)
CHLORIDE SERPL-SCNC: 108 MMOL/L (ref 98–107)
CLARITY UR: CLEAR
CO2 SERPL-SCNC: 24 MMOL/L (ref 23–31)
COLOR UR AUTO: YELLOW
CREAT SERPL-MCNC: 2.14 MG/DL (ref 0.72–1.25)
CREAT UR-MCNC: 33.9 MG/DL (ref 63–166)
CREAT UR-MCNC: 34.1 MG/DL (ref 63–166)
CREAT/UREA NIT SERPL: 15
EOSINOPHIL # BLD AUTO: 0.23 X10(3)/MCL (ref 0.04–0.54)
EOSINOPHIL NFR BLD AUTO: 3.9 % (ref 0.7–7)
ERYTHROCYTE [DISTWIDTH] IN BLOOD BY AUTOMATED COUNT: 14.8 %
FERRITIN SERPL-MCNC: 41.6 NG/ML (ref 21.81–274.66)
GFR SERPLBLD CREATININE-BSD FMLA CKD-EPI: 34 ML/MIN/1.73/M2
GLUCOSE SERPL-MCNC: 120 MG/DL (ref 82–115)
GLUCOSE UR QL STRIP: NEGATIVE
HCT VFR BLD AUTO: 39.4 % (ref 36–52)
HGB BLD-MCNC: 12.5 G/DL (ref 13–18)
HGB UR QL STRIP: NEGATIVE
IMM GRANULOCYTES # BLD AUTO: 0.02 X10(3)/MCL (ref 0–0.03)
IMM GRANULOCYTES NFR BLD AUTO: 0.3 % (ref 0–0.5)
IRON SATN MFR SERPL: 17 % (ref 20–50)
IRON SERPL-MCNC: 47 UG/DL (ref 65–175)
KETONES UR QL STRIP: NEGATIVE
LEUKOCYTE ESTERASE UR QL STRIP: NEGATIVE
LYMPHOCYTES # BLD AUTO: 0.93 X10(3)/MCL (ref 1.32–3.57)
LYMPHOCYTES NFR BLD AUTO: 15.8 % (ref 20–55)
MCH RBC QN AUTO: 29.1 PG (ref 27–34)
MCHC RBC AUTO-ENTMCNC: 31.7 G/DL (ref 31–37)
MCV RBC AUTO: 91.8 FL (ref 79–99)
MICROALBUMIN UR-MCNC: 7.3 UG/ML
MONOCYTES # BLD AUTO: 0.35 X10(3)/MCL (ref 0.3–0.82)
MONOCYTES NFR BLD AUTO: 5.9 % (ref 4.7–12.5)
NEUTROPHILS # BLD AUTO: 4.34 X10(3)/MCL (ref 1.78–5.38)
NEUTROPHILS NFR BLD AUTO: 73.8 % (ref 37–73)
NITRITE UR QL STRIP: NEGATIVE
NRBC BLD AUTO-RTO: 0 %
PH UR STRIP: 6 [PH]
PHOSPHATE SERPL-MCNC: 2.9 MG/DL (ref 2.3–4.7)
PLATELET # BLD AUTO: 199 X10(3)/MCL (ref 140–371)
PMV BLD AUTO: 10.7 FL (ref 9.4–12.4)
POTASSIUM SERPL-SCNC: 4 MMOL/L (ref 3.5–5.1)
PROT UR QL STRIP: NEGATIVE
PROT UR STRIP-MCNC: <7 MG/DL
PTH-INTACT SERPL-MCNC: 189.5 PG/ML (ref 8.7–77)
RBC # BLD AUTO: 4.29 X10(6)/MCL (ref 4–6)
SODIUM SERPL-SCNC: 143 MMOL/L (ref 136–145)
SP GR UR STRIP.AUTO: 1.01 (ref 1–1.03)
TIBC SERPL-MCNC: 229 UG/DL (ref 60–240)
TIBC SERPL-MCNC: 276 UG/DL (ref 250–450)
URINE PROTEIN/CREATININE RATIO (OLG): <0.2
UROBILINOGEN UR STRIP-ACNC: 0.2
WBC # BLD AUTO: 5.89 X10(3)/MCL (ref 4–11.5)

## 2025-08-13 PROCEDURE — 83970 ASSAY OF PARATHORMONE: CPT

## 2025-08-13 PROCEDURE — 81003 URINALYSIS AUTO W/O SCOPE: CPT

## 2025-08-13 PROCEDURE — 82306 VITAMIN D 25 HYDROXY: CPT

## 2025-08-13 PROCEDURE — 82570 ASSAY OF URINE CREATININE: CPT | Mod: 59

## 2025-08-13 PROCEDURE — 80069 RENAL FUNCTION PANEL: CPT

## 2025-08-13 PROCEDURE — 82728 ASSAY OF FERRITIN: CPT

## 2025-08-13 PROCEDURE — 85025 COMPLETE CBC W/AUTO DIFF WBC: CPT

## 2025-08-13 PROCEDURE — 82570 ASSAY OF URINE CREATININE: CPT

## 2025-08-13 PROCEDURE — 36415 COLL VENOUS BLD VENIPUNCTURE: CPT

## 2025-08-13 PROCEDURE — 82652 VIT D 1 25-DIHYDROXY: CPT

## 2025-08-13 PROCEDURE — 83540 ASSAY OF IRON: CPT

## 2025-08-18 LAB — 1,25(OH)2D SERPL-MCNC: 38 PG/ML (ref 18–64)

## (undated) DEVICE — HOLDER STRIP-T SELF ADH 2X10IN

## (undated) DEVICE — NDL ASPIRATOR AIR 16G

## (undated) DEVICE — DRAIN CHEST DRY SUCTION

## (undated) DEVICE — KIT SURGICAL TURNOVER

## (undated) DEVICE — Device

## (undated) DEVICE — DRESSING TELFA + RECT 6X10IN

## (undated) DEVICE — DRAPE SLUSH WARMER WITH DISC

## (undated) DEVICE — DRESSING TELFA + BARR 4X6IN

## (undated) DEVICE — SYS VIRTUOSAPH PLUS EVM

## (undated) DEVICE — STOPCOCK 4-WAY

## (undated) DEVICE — GUIDE LAUNCHER 6FR EBU 3.0

## (undated) DEVICE — CATH EXPO FR4 6F

## (undated) DEVICE — ANGIOTOUCH KIT

## (undated) DEVICE — CORD LAP 10 DISP

## (undated) DEVICE — SPONGE LAP 18X18 PREWASHED

## (undated) DEVICE — SUT MONOCRYL PLUS UD 3-0 27

## (undated) DEVICE — APPLIER LIGACLIP SM 9.38IN

## (undated) DEVICE — CATH THORACIC 28FR ST

## (undated) DEVICE — SOL PLASMALYTE PH 7.4 1000ML

## (undated) DEVICE — KIT C.A.T.S. FAST START

## (undated) DEVICE — SPONGE GAUZE 16PLY 4X4

## (undated) DEVICE — CARTRIDGE HEPARIN 2 CHNNL ACT

## (undated) DEVICE — PAD DEFIB CADENCE ADULT R2

## (undated) DEVICE — PACK SURG PERF CARDPULM BYPS

## (undated) DEVICE — PAD DEFIB RADIOTRANSPARENT

## (undated) DEVICE — CANNULA MC2 OVAL NVENT 32/40FR

## (undated) DEVICE — SOL ELECTROLYTE PH 7.4 500ML

## (undated) DEVICE — DRAPE SLUSH WARMER 66X44IN

## (undated) DEVICE — GLOVE COTTON KNITTED CUFF

## (undated) DEVICE — SHEATH GLIDE SLENDER 6FR

## (undated) DEVICE — DEVICE CLSR ATRICLIP FLEX 40MM

## (undated) DEVICE — SYS CLSR DERMABOND PRINEO 22CM

## (undated) DEVICE — GLOVE PROTEXIS PI SYN SURG 7.5

## (undated) DEVICE — BOWL UTILITY BLUE 32OZ

## (undated) DEVICE — PENCIL ELECSURG ROCKER 15FT

## (undated) DEVICE — SOL NACL IRR 3000ML

## (undated) DEVICE — SOL LAC RINGERS 1000ML INJ

## (undated) DEVICE — INSERT INTRACK CLAMP ULTRA 88M

## (undated) DEVICE — HEMOSTAT SURGICEL FIBRLR 2X4IN

## (undated) DEVICE — TRAY CATH FOL SIL URIMTR 16FR

## (undated) DEVICE — CUSHION  WC FOAM 20X20X.75IN

## (undated) DEVICE — CATH ANGIO EXPO FL3.5 6F

## (undated) DEVICE — ELECTRODE PATIENT RETURN DISP

## (undated) DEVICE — CANNULA NON-VENT 24FR 14.5IN

## (undated) DEVICE — HEMOSTAT VASC BAND REG 24CM

## (undated) DEVICE — DRAIN CHEST WATER SEAL

## (undated) DEVICE — KIT CATHGARD DBL LUMN 9FRX11.5

## (undated) DEVICE — SUT PROLENE 7-0 BV175-6

## (undated) DEVICE — CARTRIDGE HEPARIN DOSE

## (undated) DEVICE — SUT VICRYL 2-0 36 CT-1

## (undated) DEVICE — GLOVE PROTEXIS HYDROGEL SZ6.5

## (undated) DEVICE — SUT PROLENE 7-0 BV-1 30 BLU

## (undated) DEVICE — SOL .9NACL PF 100 ML

## (undated) DEVICE — BAG MEDI-PLAST DECANTER C-FLOW

## (undated) DEVICE — SOL NORMAL USPCA 0.9%

## (undated) DEVICE — CATH THOR STND RGHT ANG 28F

## (undated) DEVICE — SYR 10CC LUER LOCK

## (undated) DEVICE — GLOVE PROTEXIS BLUE LATEX 7

## (undated) DEVICE — ELECTRODE PROPAD MULTI W/10FT

## (undated) DEVICE — SOL NACL IRR 1000ML BTL

## (undated) DEVICE — BANDAGE ACE DOUBLE STER 6IN

## (undated) DEVICE — INSERT INTRACK CLAMP ULT 66MM

## (undated) DEVICE — GLOVE PROTEXIS NEOPRN SZ8

## (undated) DEVICE — SYR SLIP TIP 20CC

## (undated) DEVICE — TUBING INSUFFLATOR W/ROT CONCT

## (undated) DEVICE — ARMBOARD ADULT ARTERIAL

## (undated) DEVICE — SUT 2/0 36IN ETHIBOND EXCE

## (undated) DEVICE — CARTRIDGE SILV 4CHAN 2.0-3.5MG

## (undated) DEVICE — ELECTRODE BLD EXT 6.50 ST DISP

## (undated) DEVICE — GUIDEWIRE INQWIRE SE 3MM JTIP

## (undated) DEVICE — SUT VICRYL 2-0 27 CT-1

## (undated) DEVICE — BLANKET HYPER ADULT 24X60IN

## (undated) DEVICE — SOL CLEARIFY VISUALIZATION LAP

## (undated) DEVICE — HEMOCONCENTRATOR W TBNG ADPT

## (undated) DEVICE — SENSOR LOW LEVEL OXYGEN

## (undated) DEVICE — SCISSOR CURVED ENDOPATH 5MM

## (undated) DEVICE — TROCAR ENDOPATH EXCEL DILATING

## (undated) DEVICE — DRAPE STERI INCISE 23X23IN

## (undated) DEVICE — SUT PROLENE 5-0 36IN C-1

## (undated) DEVICE — SUT ETHBND XTRA 1 OS-8 30IN

## (undated) DEVICE — BLADE SCALP OPHTL BEVEL STR

## (undated) DEVICE — CATH EXPO PIG ANGIO

## (undated) DEVICE — CATH SWAN GANZ 8FR HEP FREE

## (undated) DEVICE — PUNCH AORTIC ROT TIP 4.0MM

## (undated) DEVICE — TRAY CATH FOL SIL TEMP 10 16FR

## (undated) DEVICE — GOWN SMARTSLEEVE AAMI LVL4 XXL

## (undated) DEVICE — DRESSING TEGADERM CHG 3.5X4.5

## (undated) DEVICE — SOL IRRI STRL WATER 1000ML

## (undated) DEVICE — SUT 2 30IN SILK BLK BRAIDE

## (undated) DEVICE — KIT MANIFOLD LOW PRESS TUBING

## (undated) DEVICE — IRRIGATOR SUCTION W/TIP

## (undated) DEVICE — SUT SILK 2-0 BLK BR KS 30 I